# Patient Record
Sex: MALE | Race: WHITE | NOT HISPANIC OR LATINO | Employment: OTHER | URBAN - METROPOLITAN AREA
[De-identification: names, ages, dates, MRNs, and addresses within clinical notes are randomized per-mention and may not be internally consistent; named-entity substitution may affect disease eponyms.]

---

## 2017-09-13 DIAGNOSIS — E78.5 HYPERLIPIDEMIA LDL GOAL <130: ICD-10-CM

## 2017-09-13 DIAGNOSIS — I10 ESSENTIAL HYPERTENSION WITH GOAL BLOOD PRESSURE LESS THAN 140/90: ICD-10-CM

## 2017-09-13 LAB
ANION GAP SERPL CALCULATED.3IONS-SCNC: 14 MMOL/L (ref 3–14)
BUN SERPL-MCNC: 14 MG/DL (ref 7–30)
CALCIUM SERPL-MCNC: 6.2 MG/DL (ref 8.5–10.1)
CHLORIDE SERPL-SCNC: 106 MMOL/L (ref 94–109)
CHOLEST SERPL-MCNC: 185 MG/DL
CO2 SERPL-SCNC: 20 MMOL/L (ref 20–32)
CREAT SERPL-MCNC: 0.9 MG/DL (ref 0.66–1.25)
CREAT UR-MCNC: 95 MG/DL
GFR SERPL CREATININE-BSD FRML MDRD: 82 ML/MIN/1.7M2
GLUCOSE SERPL-MCNC: 102 MG/DL (ref 70–99)
HDLC SERPL-MCNC: 84 MG/DL
LDLC SERPL CALC-MCNC: 91 MG/DL
MICROALBUMIN UR-MCNC: 6 MG/L
MICROALBUMIN/CREAT UR: 5.84 MG/G CR (ref 0–17)
NONHDLC SERPL-MCNC: 101 MG/DL
POTASSIUM SERPL-SCNC: 4.1 MMOL/L (ref 3.4–5.3)
SODIUM SERPL-SCNC: 140 MMOL/L (ref 133–144)
TRIGL SERPL-MCNC: 48 MG/DL

## 2017-09-13 PROCEDURE — 36415 COLL VENOUS BLD VENIPUNCTURE: CPT | Performed by: FAMILY MEDICINE

## 2017-09-13 PROCEDURE — 80061 LIPID PANEL: CPT | Performed by: FAMILY MEDICINE

## 2017-09-13 PROCEDURE — 82043 UR ALBUMIN QUANTITATIVE: CPT | Performed by: FAMILY MEDICINE

## 2017-09-13 PROCEDURE — 80048 BASIC METABOLIC PNL TOTAL CA: CPT | Performed by: FAMILY MEDICINE

## 2017-09-13 NOTE — LETTER
September 14, 2017      Alonso Churchill  [address]  [place] [address]        Dear Alonso,    Your test results are attached. I am happy to let you know that they are stable and your medications can stay the same.    The blood sugar is normal and you do not have diabetes. The kidneys are healthy. The calcium level is lower. You may need more vitamin D to help absorb calcium from your diet. Please take 1000 units a day if you are not already taking vitamin D.      Resulted Orders   Lipid panel reflex to direct LDL   Result Value Ref Range    Cholesterol 185 <200 mg/dL    Triglycerides 48 <150 mg/dL      Comment:      Fasting specimen    HDL Cholesterol 84 >39 mg/dL    LDL Cholesterol Calculated 91 <100 mg/dL      Comment:      Desirable:       <100 mg/dl    Non HDL Cholesterol 101 <130 mg/dL   Basic metabolic panel   Result Value Ref Range    Sodium 140 133 - 144 mmol/L    Potassium 4.1 3.4 - 5.3 mmol/L    Chloride 106 94 - 109 mmol/L    Carbon Dioxide 20 20 - 32 mmol/L    Anion Gap 14 3 - 14 mmol/L    Glucose 102 (H) 70 - 99 mg/dL      Comment:      Fasting specimen    Urea Nitrogen 14 7 - 30 mg/dL    Creatinine 0.90 0.66 - 1.25 mg/dL    GFR Estimate 82 >60 mL/min/1.7m2      Comment:      Non  GFR Calc    GFR Estimate If Black >90 >60 mL/min/1.7m2      Comment:       GFR Calc    Calcium 6.2 (L) 8.5 - 10.1 mg/dL   Albumin Random Urine Quantitative with Creat Ratio   Result Value Ref Range    Creatinine Urine 95 mg/dL    Albumin Urine mg/L 6 mg/L    Albumin Urine mg/g Cr 5.84 0 - 17 mg/g Cr       Please call me if you have any questions about these test results or about your care.    Sincerely,      Yvonne Granado MD/rafy

## 2017-09-15 ENCOUNTER — OFFICE VISIT (OUTPATIENT)
Dept: FAMILY MEDICINE | Facility: CLINIC | Age: 72
End: 2017-09-15
Payer: MEDICARE

## 2017-09-15 VITALS
DIASTOLIC BLOOD PRESSURE: 73 MMHG | OXYGEN SATURATION: 99 % | WEIGHT: 147 LBS | TEMPERATURE: 98.4 F | SYSTOLIC BLOOD PRESSURE: 139 MMHG | BODY MASS INDEX: 23.63 KG/M2 | HEART RATE: 64 BPM | HEIGHT: 66 IN

## 2017-09-15 DIAGNOSIS — D69.2 PURPURA (H): ICD-10-CM

## 2017-09-15 DIAGNOSIS — E55.9 VITAMIN D DEFICIENCY: ICD-10-CM

## 2017-09-15 DIAGNOSIS — Z23 NEED FOR PROPHYLACTIC VACCINATION AND INOCULATION AGAINST INFLUENZA: ICD-10-CM

## 2017-09-15 DIAGNOSIS — E83.51 HYPOCALCEMIA: ICD-10-CM

## 2017-09-15 DIAGNOSIS — E78.5 HYPERLIPIDEMIA LDL GOAL <130: ICD-10-CM

## 2017-09-15 DIAGNOSIS — L57.0 AK (ACTINIC KERATOSIS): ICD-10-CM

## 2017-09-15 DIAGNOSIS — Z00.01 ENCOUNTER FOR ROUTINE ADULT PHYSICAL EXAM WITH ABNORMAL FINDINGS: Primary | ICD-10-CM

## 2017-09-15 DIAGNOSIS — I10 ESSENTIAL HYPERTENSION WITH GOAL BLOOD PRESSURE LESS THAN 140/90: ICD-10-CM

## 2017-09-15 DIAGNOSIS — M48.02 CERVICAL STENOSIS OF SPINAL CANAL: ICD-10-CM

## 2017-09-15 DIAGNOSIS — Z97.4 WEARS HEARING AID: ICD-10-CM

## 2017-09-15 DIAGNOSIS — K63.8219 SMALL INTESTINAL BACTERIAL OVERGROWTH: ICD-10-CM

## 2017-09-15 DIAGNOSIS — K58.2 IRRITABLE BOWEL SYNDROME WITH BOTH CONSTIPATION AND DIARRHEA: ICD-10-CM

## 2017-09-15 LAB
ALBUMIN SERPL-MCNC: 3.5 G/DL (ref 3.4–5)
CA-I SERPL ISE-MCNC: 4.8 MG/DL (ref 4.4–5.2)
CALCIUM SERPL-MCNC: 8.7 MG/DL (ref 8.5–10.1)
DEPRECATED CALCIDIOL+CALCIFEROL SERPL-MC: 40 UG/L (ref 20–75)
ERYTHROCYTE [DISTWIDTH] IN BLOOD BY AUTOMATED COUNT: 12.6 % (ref 10–15)
HCT VFR BLD AUTO: 39.3 % (ref 40–53)
HGB BLD-MCNC: 13.3 G/DL (ref 13.3–17.7)
MAGNESIUM SERPL-MCNC: 2.1 MG/DL (ref 1.6–2.3)
MCH RBC QN AUTO: 33.2 PG (ref 26.5–33)
MCHC RBC AUTO-ENTMCNC: 33.8 G/DL (ref 31.5–36.5)
MCV RBC AUTO: 98 FL (ref 78–100)
PLATELET # BLD AUTO: 239 10E9/L (ref 150–450)
PTH-INTACT SERPL-MCNC: 38 PG/ML (ref 12–72)
RBC # BLD AUTO: 4.01 10E12/L (ref 4.4–5.9)
WBC # BLD AUTO: 4.2 10E9/L (ref 4–11)

## 2017-09-15 PROCEDURE — 85027 COMPLETE CBC AUTOMATED: CPT | Performed by: FAMILY MEDICINE

## 2017-09-15 PROCEDURE — G0008 ADMIN INFLUENZA VIRUS VAC: HCPCS | Performed by: FAMILY MEDICINE

## 2017-09-15 PROCEDURE — 82040 ASSAY OF SERUM ALBUMIN: CPT | Performed by: FAMILY MEDICINE

## 2017-09-15 PROCEDURE — 83735 ASSAY OF MAGNESIUM: CPT | Performed by: FAMILY MEDICINE

## 2017-09-15 PROCEDURE — 90662 IIV NO PRSV INCREASED AG IM: CPT | Performed by: FAMILY MEDICINE

## 2017-09-15 PROCEDURE — 83970 ASSAY OF PARATHORMONE: CPT | Performed by: FAMILY MEDICINE

## 2017-09-15 PROCEDURE — G0439 PPPS, SUBSEQ VISIT: HCPCS | Performed by: FAMILY MEDICINE

## 2017-09-15 PROCEDURE — 82306 VITAMIN D 25 HYDROXY: CPT | Performed by: FAMILY MEDICINE

## 2017-09-15 PROCEDURE — 36415 COLL VENOUS BLD VENIPUNCTURE: CPT | Performed by: FAMILY MEDICINE

## 2017-09-15 PROCEDURE — 82330 ASSAY OF CALCIUM: CPT | Performed by: FAMILY MEDICINE

## 2017-09-15 PROCEDURE — 82310 ASSAY OF CALCIUM: CPT | Performed by: FAMILY MEDICINE

## 2017-09-15 RX ORDER — VITAMIN E 268 MG
CAPSULE ORAL
COMMUNITY
End: 2020-09-22

## 2017-09-15 RX ORDER — CALCIUM CARBONATE 300MG(750)
TABLET,CHEWABLE ORAL
COMMUNITY
End: 2020-09-22

## 2017-09-15 RX ORDER — FOLIC ACID/MULTIVIT,IRON,MINER 0.4MG-18MG
TABLET ORAL 2 TIMES DAILY
COMMUNITY

## 2017-09-15 RX ORDER — NICOTINE 14MG/24HR
PATCH, TRANSDERMAL 24 HOURS TRANSDERMAL DAILY
COMMUNITY
End: 2019-09-19

## 2017-09-15 RX ORDER — CALCIUM CARBONATE 500 MG/1
1 TABLET, CHEWABLE ORAL 2 TIMES DAILY
Qty: 100 TABLET | Refills: 3 | Status: SHIPPED | OUTPATIENT
Start: 2017-09-15 | End: 2018-09-18

## 2017-09-15 ASSESSMENT — PAIN SCALES - GENERAL: PAINLEVEL: NO PAIN (0)

## 2017-09-15 NOTE — PROGRESS NOTES
SUBJECTIVE:   Alonso Churchill is a 72 year old male who presents for Preventive Visit.  Are you in the first 12 months of your Medicare Part B coverage?  No    Healthy Habits:    Do you get at least three servings of calcium containing foods daily (dairy, green leafy vegetables, etc.)? No due to small intestinal bacterial overload (SIBO), saw [place] doctor and was put on a strict diet for a while and treated with Xifaxan 550 mg.  3 hemorrhoid banded.     Amount of exercise or daily activities, outside of work: 4-5 day(s) per week    Problems taking medications regularly No    Medication side effects: No    Have you had an eye exam in the past two years? yes    Do you see a dentist twice per year? yes    Do you have sleep apnea, excessive snoring or daytime drowsiness?no    COGNITIVE SCREEN  1) Repeat 3 items (Banana, Sunrise, Chair)    2) Clock draw: ABNORMAL correct hand on 11am but incorrect hand on 15 minutes  3) 3 item recall: Recalls 3 objects  Results: ABNORMAL clock, 1-2 items recalled: PROBABLE COGNITIVE IMPAIRMENT, **INFORM PROVIDER**    Mini-CogTM Copyright S María. Licensed by the author for use in [facility]; reprinted with permission (nima@[facility]). All rights reserved.            Hyperlipidemia Follow-Up      Rate your low fat/cholesterol diet?: good    Taking statin?  Yes, no muscle aches from statin    Other lipid medications/supplements?:  none    Hypertension Follow-up      Outpatient blood pressures are not being checked.    Low Salt Diet: no added salt    Last Basic Metabolic Panel:  Lab Results   Component Value Date     09/13/2017      Lab Results   Component Value Date    POTASSIUM 4.1 09/13/2017     Lab Results   Component Value Date    CHLORIDE 106 09/13/2017     Lab Results   Component Value Date    EFRAIN 6.2 09/13/2017     Lab Results   Component Value Date    CO2 20 09/13/2017     Lab Results   Component Value Date    BUN 14 09/13/2017     Lab Results   Component  Value Date    CR 0.90 09/13/2017     Lab Results   Component Value Date     09/13/2017      calcium level dropped and this may be due to decreased dairy intake, vitamin D deficiency, PTH deficiency. No muscle cramps or other symptoms related to low calcium noted. Not taking any calcium or vitamin D supplements.         Reviewed and updated as needed this visit by clinical staffTobacco  Allergies  Meds         Reviewed and updated as needed this visit by Provider        Social History   Substance Use Topics     Smoking status: Former Smoker     Packs/day: 0.50     Years: 10.00     Types: Cigarettes     Smokeless tobacco: Never Used      Comment: 35 years      Alcohol use 0.0 oz/week     0 Standard drinks or equivalent per week      Comment: glass of wine per day        The patient does not drink >3 drinks per day nor >7 drinks per week.    Today's PHQ-2 Score:   PHQ-2 ( 1999 [employer]) 9/15/2017 9/12/2016   Q1: Little interest or pleasure in doing things 0 -   Q2: Feeling down, depressed or hopeless 0 -   PHQ-2 Score 0 -   Q1: Little interest or pleasure in doing things - Not at all   Q2: Feeling down, depressed or hopeless - Not at all   PHQ-2 Score - 0         Do you feel safe in your environment - Yes    Do you have a Health Care Directive?: Yes: Patient has healthcare directive and brought with today to be scanned in.    Current providers sharing in care for this patient include: Patient Care Team:  Yvonne Granado MD as PCP - General (Family Practice)      Hearing impairment: Yes, uses hearing aid    Ability to successfully perform activities of daily living: Yes, no assistance needed     Fall risk:  Fallen 2 or more times in the past year?: No  Any fall with injury in the past year?: No      Home safety:  none identified  click delete button to remove this line now    The following health maintenance items are reviewed in Epic and correct as of today:Health Maintenance   Topic Date Due     AORTIC  ANEURYSM SCREENING (SYSTEM ASSIGNED)  04/03/2009     ADVANCE DIRECTIVE PLANNING Q5 YRS  12/22/2016     INFLUENZA VACCINE (SYSTEM ASSIGNED)  09/01/2017     FALL RISK ASSESSMENT  09/15/2017     COLON CANCER SCREEN (SYSTEM ASSIGNED)  05/26/2021     LIPID SCREEN Q5 YR MALE (SYSTEM ASSIGNED)  09/13/2022     TETANUS IMMUNIZATION (SYSTEM ASSIGNED)  09/15/2026     PNEUMOCOCCAL  Completed     Labs reviewed in EPIC  BP Readings from Last 3 Encounters:   09/15/17 139/73   09/15/16 138/72   08/18/16 138/70    Wt Readings from Last 3 Encounters:   09/15/17 147 lb (66.7 kg)   09/15/16 153 lb (69.4 kg)   08/18/16 153 lb (69.4 kg)                  Patient Active Problem List   Diagnosis     IBS (irritable bowel syndrome)     Hyperlipidemia LDL goal <130     Advance care planning     AK (actinic keratosis)     Family history of prostate cancer     Cervical stenosis of spinal canal     Essential hypertension with goal blood pressure less than 140/90     Wears hearing aid     Past Surgical History:   Procedure Laterality Date     HEMORRHOIDECTOMY BANDING  2017     ROTATOR CUFF REPAIR RT/LT  3/2012    right  - dr. benavidez     SURGICAL HISTORY OF -   disc surgery     SURGICAL HISTORY OF -   back surgery       Social History   Substance Use Topics     Smoking status: Former Smoker     Packs/day: 0.50     Years: 10.00     Types: Cigarettes     Smokeless tobacco: Never Used      Comment: 35 years      Alcohol use 0.0 oz/week     0 Standard drinks or equivalent per week      Comment: glass of wine per day      Family History   Problem Relation Age of Onset     HEART DISEASE Father      CEREBROVASCULAR DISEASE Father      Prostate Cancer Brother      had surgery         Current Outpatient Prescriptions   Medication Sig Dispense Refill     Psyllium (METAMUCIL PO) Take by mouth every morning       Omega-3 Fatty Acids (OMEGA-3 FISH OIL) 1200 MG CAPS Take by mouth 2 times daily       Zinc Sulfate (ZINC 15 PO) Take by mouth three times a week        vitamin E 400 UNIT capsule Take by mouth three times a week       Magnesium 400 MG TABS Take by mouth three times a week       Saccharomyces boulardii (PROBIOTIC) 250 MG CAPS Take by mouth daily       losartan (COZAAR) 50 MG tablet Take 1 tablet (50 mg) by mouth daily for blood pressure. 90 tablet 3     simvastatin (ZOCOR) 20 MG tablet Take 1 tablet (20 mg) by mouth every evening 90 tablet 3     Cyanocobalamin (B-12 PO) Take 400 mg by mouth       MELOXICAM PO Take 7.5 mg by mouth daily        CYCLOBENZAPRINE HCL PO Take 10 mg by mouth daily as needed for muscle spasms       MULTI-VITAMIN PO 1 Tablet every day       CALCIUM 600/VITAMIN D 600-400 MG-UNIT PO TABS 1 Tablet every other day       ASPIRIN 81 MG PO TABS 1 TABLET DAILY*       Allergies   Allergen Reactions     Lisinopril      Cough       Trazodone Hcl Other (See Comments)     Dizzy feeling and funny dreams     Recent Labs   Lab Test  09/13/17   0655  09/02/16   0650  09/14/15   0730   09/12/14   0817  09/11/13   0825   12/22/10   0848   LDL  91  91  95   --   91  93   < >  82   HDL  84  75  75   --   84  65   < >  61   TRIG  48  49  58   --   64  58   < >  49   ALT   --   22   --    --   30  22   < >  30   CR  0.90  0.88  0.75   --   0.92  0.85   < >  0.84   GFRESTIMATED  82  86  >90  Non  GFR Calc     --   81  89   < >  >90   GFRESTBLACK  >90  >90  African American GFR Calc    >90   GFR Calc     --   >90   GFR Calc    >90   < >  >90   POTASSIUM  4.1  4.3  4.2   < >  5.4*  4.7   < >  5.3   TSH   --    --    --    --    --   1.87   --   1.11    < > = values in this interval not displayed.              Pneumonia Vaccine:Adults age 65+ who received Pneumovax (PPSV23) at 65 years or older: Should be given PCV13 > 1 year after their most recent PPSV23    ROS:  Constitutional, HEENT, cardiovascular, pulmonary, gi and gu systems are negative, except as otherwise noted.      OBJECTIVE:   /73 (BP Location:  "Right arm, Patient Position: Chair, Cuff Size: Adult Regular)  Pulse 64  Temp 98.4  F (36.9  C) (Oral)  Ht 5' 6\" (1.676 m)  Wt 147 lb (66.7 kg)  SpO2 99%  BMI 23.73 kg/m2 Estimated body mass index is 23.73 kg/(m^2) as calculated from the following:    Height as of this encounter: 5' 6\" (1.676 m).    Weight as of this encounter: 147 lb (66.7 kg).  EXAM:   GENERAL: elderly, alert, well nourished, well hydrated, no distress  HENT: ear canals- normal; TMs- normal; Nose- normal; Mouth- no ulcers, no lesions, missing dentition  NECK: no tenderness, no adenopathy, no asymmetry, no masses, no stiffness; thyroid- normal to palpation  RESP: lungs clear to auscultation - no rales, no rhonchi, no wheezes  CV: regular rates and rhythm, normal S1 S2, no S3 or S4 and no murmur, no click or rub, normal pulses  ABDOMEN: soft, no tenderness, no  hepatosplenomegaly, no masses, normal bowel sounds  MS: extremities- no gross deformities noted, no edema  SKIN: no suspicious lesions, no rashes, age related skin changes with seborrheic keratosis and no actinic keratosis. Purpura on arms and legs.   NEURO: strength and tone- normal, sensory exam- grossly normal, reflexes- symmetric  BACK: no CVA tenderness, no paralumbar tenderness  MENTAL STATUS EXAM:  Appearance/Behavior: no apparent distress, neatly groomed, dressed appropriately for weather, appears stated age and is not frail-appearing  Speech: normal  Mood/Affect: normal affect  Insight: Good      ASSESSMENT / PLAN:       ICD-10-CM    1. Encounter for routine adult physical exam with abnormal findings Z00.01 Low calcium and purpura on exam. Recent episode of small bowel bacterial overgrowth.    2. Purpura (H) D69.2 CBC with platelets, recurrent lesions over past few months   3. Hypocalcemia- diet has changed with cutting out dairy altogether and no vitamin D or calcium supplements E83.51 Calcium     Calcium ionized     Parathyroid Hormone Intact     Albumin level     Magnesium " "    calcium carbonate (TUMS) 500 MG chewable tablet   4. Hyperlipidemia LDL goal <130 E78.5 stable   5. Essential hypertension with goal blood pressure less than 140/90 I10 Well controlled on medications    6. Small intestinal bacterial overgrowth K63.89 Treated in [place] with resolved symptoms    7. Irritable bowel syndrome with both constipation and diarrhea K58.2 stable   8. AK (actinic keratosis) L57.0 Follow up with dermatology as scheduled   9. Cervical stenosis of spinal canal M48.02 Pain is stable but can't do some of his old activities like golfing.    10. Need for prophylactic vaccination and inoculation against influenza Z23      ADMIN VACCINE, FIRST [15076]     FLU VACCINE, INCREASED ANTIGEN, PRESV FREE, AGE 65+ [86831]   11. Wears hearing aid Z97.4 Normal functioning   12. Vitamin D deficiency- will restart supplement E55.9 cholecalciferol (VITAMIN D) 1000 UNIT tablet     Vitamin D Deficiency       End of Life Planning:  Patient currently has an advanced directive: Yes.  Practitioner is supportive of decision.    COUNSELING:  Reviewed preventive health counseling, as reflected in patient instructions       Regular exercise       Healthy diet/nutrition       Dental care       Colon cancer screening       Prostate cancer screening        Estimated body mass index is 23.73 kg/(m^2) as calculated from the following:    Height as of this encounter: 5' 6\" (1.676 m).    Weight as of this encounter: 147 lb (66.7 kg).     reports that he has quit smoking. His smoking use included Cigarettes. He has a 5.00 pack-year smoking history. He has never used smokeless tobacco.        Appropriate preventive services were discussed with this patient, including applicable screening as appropriate for cardiovascular disease, diabetes, osteopenia/osteoporosis, and glaucoma.  As appropriate for age/gender, discussed screening for colorectal cancer, prostate cancer, breast cancer, and cervical cancer. Checklist reviewing " preventive services available has been given to the patient.    Reviewed patients plan of care and provided an AVS. The Basic Care Plan (routine screening as documented in Health Maintenance) for Alonso meets the Care Plan requirement. This Care Plan has been established and reviewed with the Patient.    Counseling Resources:  ATP IV Guidelines  Pooled Cohorts Equation Calculator  Breast Cancer Risk Calculator  FRAX Risk Assessment  ICSI Preventive Guidelines  Dietary Guidelines for Americans, 2010  USDA's MyPlate  ASA Prophylaxis  Lung CA Screening    Yvonne Granado MD  [facility]

## 2017-09-15 NOTE — NURSING NOTE
"Chief Complaint   Patient presents with     Physical       Initial /73 (BP Location: Right arm, Patient Position: Chair, Cuff Size: Adult Regular)  Pulse 64  Temp 98.4  F (36.9  C) (Oral)  Ht 5' 6\" (1.676 m)  Wt 147 lb (66.7 kg)  SpO2 99%  BMI 23.73 kg/m2 Estimated body mass index is 23.73 kg/(m^2) as calculated from the following:    Height as of this encounter: 5' 6\" (1.676 m).    Weight as of this encounter: 147 lb (66.7 kg).  Medication Reconciliation: complete     Rashad Lees CMA    "

## 2017-09-15 NOTE — MR AVS SNAPSHOT
After Visit Summary   9/15/2017    Alonso Churchill    MRN: [id number]           Patient Information     Date Of Birth          1945        Visit Information        Provider Department      9/15/2017 7:40 AM Yvonne Granado MD [facility]        Today's Diagnoses     Encounter for child physical exam with abnormal findings    -  1    Need for prophylactic vaccination and inoculation against influenza        Hyperlipidemia LDL goal <130        Essential hypertension with goal blood pressure less than 140/90        Irritable bowel syndrome with both constipation and diarrhea        AK (actinic keratosis)        Cervical stenosis of spinal canal        Wears hearing aid        Vitamin D deficiency        Hypocalcemia        Purpura (H)          Care Instructions    How to contact your care team: [phone] Pharmacy [phone]   MD JOANA PEMBERTON PA-C CHRIS JONES, PA-C NAM HO, MD JONATHAN BATES, MD ARVIN VOCAL, MD    Clinic hours M-Th 7am-7pm Fri 7am-5pm.   Urgent care M-F 11am-9pm  Sat/Sun 9am-5pm.   Pharmacy   Mon-Th:  8:00am-8pm   Fri:  8:00am-6:00pm  Sat/Sun  8:00am-5:00 pm       Preventive Health Recommendations:   Male Ages 65 and over    Yearly exam:             See your health care provider every year in order to  o   Review health changes.   o   Discuss preventive care.    o   Review your medicines if your doctor has prescribed any.    Talk with your health care provider about whether you should have a test to screen for prostate cancer (PSA).    Every 3 years, have a diabetes test (fasting glucose). If you are at risk for diabetes, you should have this test more often.    Every 5 years, have a cholesterol test. Have this test more often if you are at risk for high cholesterol or heart disease.     Every 10 years, have a colonoscopy. Or, have a yearly FIT test (stool test). These exams will check for colon cancer.    Talk to with your  health care provider about screening for Abdominal Aortic Aneurysm if you have a family history of AAA or have a history of smoking.    Shots:     Get a flu shot each year.     Get a tetanus shot every 10 years.     Talk to your doctor about your pneumonia vaccines. There are now two you should receive - Pneumovax (PPSV 23) and Prevnar (PCV 13).     Talk to your doctor about a shingles vaccine.     Talk to your doctor about the hepatitis B vaccine.  Nutrition:     Eat at least 5 servings of fruits and vegetables each day.     Eat whole-grain bread, whole-wheat pasta and brown rice instead of white grains and rice.     Talk to your provider about Calcium and Vitamin D.   Lifestyle    Exercise for at least 150 minutes a week (30 minutes a day, 5 days a week). This will help you control your weight and prevent disease.     Limit alcohol to one drink per day.     No smoking.     Wear sunscreen to prevent skin cancer.     See your dentist every six months for an exam and cleaning.   See your eye doctor every 1 to 2 years to screen for conditions such as glaucoma, macular degeneration, cataracts, etc   Hypocalcemia (Adult)  Hypocalcemia is too little calcium in the blood. Calcium is a mineral. It helps the heart and other muscles work well. It s also needed to grow and maintain strong bones and teeth.  Hypocalcemia may be caused by:  Lack of calcium or vitamin D in your diet  Digestive problems  Gland problems  Kidney or pancreas disease  Low magnesium levels  Too much phosphate in your blood  Certain medicines  Hypocalcemia can cause the muscles of the face, hands, and feet to twitch without your control (spasm). It can also cause numbness or tingling around your mouth or in your hands and feet. Other problems may include depression and memory loss.  A blood sample will be taken to check your calcium level. The test also helps figure out if hypocalcemia may be caused by a problem with your kidneys, or with the gland that  controls your calcium level (parathyroid gland). Depending on the cause, you may be given an oral calcium supplement. In severe cases, you may need a shot (injection) of calcium gluconate. You may also have a vitamin D shot or supplement. If low magnesium is the cause, you will have treatment to raise your body s level of this mineral.  Home care  Your healthcare provider may have you take calcium and vitamin D supplements, or other medicines or minerals. Follow your provider s instructions for taking these supplements.  General care  Take any medicines or supplements as directed.  Make diet changes as instructed by your provider. You may be asked to eat more dairy products like milk, cheese, and yogurt.  Avoid drinking sodas. Many of these have phosphates. These can interfere with your ability to absorb calcium.   Try to get out in the sun for at least 20 minutes each day. Sun exposure helps your body make vitamin D. This helps you absorb calcium.  Follow-up care  Follow up as advised by your healthcare provider, or as advised.  When to seek medical advice  Call your healthcare provider right away if any of these occur:  Extreme tiredness (fatigue)  Irregular heartbeat  Depression  Seeing or hearing things that aren t there (hallucinations)  Muscle cramps, spasms, or twitching  Numbness and tingling in the arms, legs, hands, or feet  Seizures  Date Last Reviewed: 8/1/2016 2000-2017 [employer]. [address] [place] [address]. All rights reserved. This information is not intended as a substitute for professional medical care. Always follow your healthcare professional's instructions.                  Follow-ups after your visit        Who to contact     If you have questions or need follow up information about today's clinic visit or your schedule please contact [facility] directly at [phone].  Normal or non-critical lab and imaging results will be communicated to  "you by MyChart, letter or phone within 4 business days after the clinic has received the results. If you do not hear from us within 7 days, please contact the clinic through [employer] or phone. If you have a critical or abnormal lab result, we will notify you by phone as soon as possible.  Submit refill requests through [employer] or call your pharmacy and they will forward the refill request to us. Please allow 3 business days for your refill to be completed.          Additional Information About Your Visit        [employer] Information     [employer] gives you secure access to your electronic health record. If you see a primary care provider, you can also send messages to your care team and make appointments. If you have questions, please call your primary care clinic.  If you do not have a primary care provider, please call [phone] and they will assist you.        Care EveryWhere ID     This is your Care EveryWhere ID. This could be used by other organizations to access your [facility] medical records  [id number]        Your Vitals Were     Pulse Temperature Height Pulse Oximetry BMI (Body Mass Index)       64 98.4  F (36.9  C) (Oral) 5' 6\" (1.676 m) 99% 23.73 kg/m2        Blood Pressure from Last 3 Encounters:   09/15/17 139/73   09/15/16 138/72   08/18/16 138/70    Weight from Last 3 Encounters:   09/15/17 147 lb (66.7 kg)   09/15/16 153 lb (69.4 kg)   08/18/16 153 lb (69.4 kg)              We Performed the Following          ADMIN VACCINE, FIRST [31703]     Albumin level     Calcium ionized     Calcium     CBC with platelets     FLU VACCINE, INCREASED ANTIGEN, PRESV FREE, AGE 65+ [33576]     Magnesium     Parathyroid Hormone Intact     Vitamin D Deficiency          Today's Medication Changes          These changes are accurate as of: 9/15/17  8:09 AM.  If you have any questions, ask your nurse or doctor.               Start taking these medicines.        Dose/Directions    cholecalciferol 1000 UNIT tablet   Commonly " known as:  vitamin D   Used for:  Vitamin D deficiency   Started by:  Yvonne Granado MD        Dose:  1000 Units   Take 1 tablet (1,000 Units) by mouth daily   Quantity:  100 tablet   Refills:  3            Where to get your medicines      These medications were sent to [id number] - [place], [place] - [address] [address]  [address] [address], [facility] [address]     Phone:  [phone]     cholecalciferol 1000 UNIT tablet                Primary Care Provider Office Phone # Fax #    Yvonne Granado [phone]       [address]  [facility] [address]        Equal Access to Services     [facility]: Hadii claribel bruno hadasho Sobaldemarali, waaxda luqadaha, qaybta kaalmada adeegyada, carmen patterson . So [facility] [phone].    ATENCIÓN: Si habla español, tiene a alcala disposición servicios gratuitos de asistencia lingüística. [facility] [phone].    We comply with applicable federal civil rights laws and [place] laws. We do not discriminate on the basis of race, color, national origin, age, disability sex, sexual orientation or gender identity.            Thank you!     Thank you for choosing [facility]  for your care. Our goal is always to provide you with excellent care. Hearing back from our patients is one way we can continue to improve our services. Please take a few minutes to complete the written survey that you may receive in the mail after your visit with us. Thank you!             Your Updated Medication List - Protect others around you: Learn how to safely use, store and throw away your medicines at www.disposemymeds.org.          This list is accurate as of: 9/15/17  8:09 AM.  Always use your most recent med list.                   Brand Name Dispense Instructions for use Diagnosis    aspirin 81 MG tablet      1 TABLET DAILY*        B-12 PO      Take 400 mg by mouth        calcium 600/vitamin D 600-400 MG-UNIT Tabs    Generic drug:  Calcium Carbonate-Vitamin D3      1 Tablet every other day        cholecalciferol 1000 UNIT tablet    vitamin D    100 tablet    Take 1 tablet (1,000 Units) by mouth daily    Vitamin D deficiency       CYCLOBENZAPRINE HCL PO      Take 10 mg by mouth daily as needed for muscle spasms        losartan 50 MG tablet    COZAAR    90 tablet    Take 1 tablet (50 mg) by mouth daily for blood pressure.    Essential hypertension with goal blood pressure less than 140/90       Magnesium 400 MG Tabs      Take by mouth three times a week        MELOXICAM PO      Take 7.5 mg by mouth daily        METAMUCIL PO      Take by mouth every morning        MULTI-VITAMIN PO      1 Tablet every day        Omega-3 Fish Oil 1200 MG Caps      Take by mouth 2 times daily        Probiotic 250 MG Caps      Take by mouth daily        simvastatin 20 MG tablet    ZOCOR    90 tablet    Take 1 tablet (20 mg) by mouth every evening    Hyperlipidemia LDL goal <130       vitamin E 400 UNIT capsule      Take by mouth three times a week        ZINC 15 PO      Take by mouth three times a week

## 2017-09-15 NOTE — NURSING NOTE
Injectable Influenza Immunization Documentation      1.  Has the patient received the information for the injectable influenza vaccine? YES    2. Is the patient 6 months of age or older? YES    3. Does the patient have any of the following contraindications?          Severe allergy to eggs? No     Severe allergic reaction to previous influenza vaccines? No     Allergy to contact lens solution/thimerosol or latex? No     History of Guillain-[place] syndrome? No     Undergoing chemotherapy or radiation therapy?       (vaccine should be given at least 2 weeks prior or 3 weeks after)  No     Currently have moderate or severe illness? No         4.  The vaccine has been administered and the patient was instructed to wait 15 minutes before leaving the building in the event of an allergic reaction: YES    Vaccination given by Rashad Lees CMA

## 2017-09-15 NOTE — PATIENT INSTRUCTIONS
How to contact your care team: [phone] Pharmacy [phone]   MD JOANA PEMBERTON PA-C CHRIS JONES, PA-C NAM HO, MD JONATHAN BATES, MD ARVIN VOCAL, MD    Clinic hours M-Th 7am-7pm Fri 7am-5pm.   Urgent care M-F 11am-9pm  Sat/Sun 9am-5pm.   Pharmacy   Mon-Th:  8:00am-8pm   Fri:  8:00am-6:00pm  Sat/Sun  8:00am-5:00 pm       Preventive Health Recommendations:   Male Ages 65 and over    Yearly exam:             See your health care provider every year in order to  o   Review health changes.   o   Discuss preventive care.    o   Review your medicines if your doctor has prescribed any.    Talk with your health care provider about whether you should have a test to screen for prostate cancer (PSA).    Every 3 years, have a diabetes test (fasting glucose). If you are at risk for diabetes, you should have this test more often.    Every 5 years, have a cholesterol test. Have this test more often if you are at risk for high cholesterol or heart disease.     Every 10 years, have a colonoscopy. Or, have a yearly FIT test (stool test). These exams will check for colon cancer.    Talk to with your health care provider about screening for Abdominal Aortic Aneurysm if you have a family history of AAA or have a history of smoking.    Shots:     Get a flu shot each year.     Get a tetanus shot every 10 years.     Talk to your doctor about your pneumonia vaccines. There are now two you should receive - Pneumovax (PPSV 23) and Prevnar (PCV 13).     Talk to your doctor about a shingles vaccine.     Talk to your doctor about the hepatitis B vaccine.  Nutrition:     Eat at least 5 servings of fruits and vegetables each day.     Eat whole-grain bread, whole-wheat pasta and brown rice instead of white grains and rice.     Talk to your provider about Calcium and Vitamin D.   Lifestyle    Exercise for at least 150 minutes a week (30 minutes a day, 5 days a week). This will help you control your weight and prevent  disease.     Limit alcohol to one drink per day.     No smoking.     Wear sunscreen to prevent skin cancer.     See your dentist every six months for an exam and cleaning.   See your eye doctor every 1 to 2 years to screen for conditions such as glaucoma, macular degeneration, cataracts, etc   Hypocalcemia (Adult)  Hypocalcemia is too little calcium in the blood. Calcium is a mineral. It helps the heart and other muscles work well. It s also needed to grow and maintain strong bones and teeth.  Hypocalcemia may be caused by:  Lack of calcium or vitamin D in your diet  Digestive problems  Gland problems  Kidney or pancreas disease  Low magnesium levels  Too much phosphate in your blood  Certain medicines  Hypocalcemia can cause the muscles of the face, hands, and feet to twitch without your control (spasm). It can also cause numbness or tingling around your mouth or in your hands and feet. Other problems may include depression and memory loss.  A blood sample will be taken to check your calcium level. The test also helps figure out if hypocalcemia may be caused by a problem with your kidneys, or with the gland that controls your calcium level (parathyroid gland). Depending on the cause, you may be given an oral calcium supplement. In severe cases, you may need a shot (injection) of calcium gluconate. You may also have a vitamin D shot or supplement. If low magnesium is the cause, you will have treatment to raise your body s level of this mineral.  Home care  Your healthcare provider may have you take calcium and vitamin D supplements, or other medicines or minerals. Follow your provider s instructions for taking these supplements.  General care  Take any medicines or supplements as directed.  Make diet changes as instructed by your provider. You may be asked to eat more dairy products like milk, cheese, and yogurt.  Avoid drinking sodas. Many of these have phosphates. These can interfere with your ability to absorb  calcium.   Try to get out in the sun for at least 20 minutes each day. Sun exposure helps your body make vitamin D. This helps you absorb calcium.  Follow-up care  Follow up as advised by your healthcare provider, or as advised.  When to seek medical advice  Call your healthcare provider right away if any of these occur:  Extreme tiredness (fatigue)  Irregular heartbeat  Depression  Seeing or hearing things that aren t there (hallucinations)  Muscle cramps, spasms, or twitching  Numbness and tingling in the arms, legs, hands, or feet  Seizures  Date Last Reviewed: 8/1/2016 2000-2017 [employer]. [address] [place] [address]. All rights reserved. This information is not intended as a substitute for professional medical care. Always follow your healthcare professional's instructions.

## 2017-09-17 NOTE — PROGRESS NOTES
Dear Alonso    Your test results are attached. I am happy to let you know that they are stable.    The calcium level is now in normal range and your vitamin D level and parathyroid hormone is also normal. We can recheck labs in 1 year.     Please contact me by [facility]ARMA[facility] if you have any questions about your labs or management.    Yvonne Granado MD

## 2017-09-22 ENCOUNTER — MYC REFILL (OUTPATIENT)
Dept: FAMILY MEDICINE | Facility: CLINIC | Age: 72
End: 2017-09-22

## 2017-09-22 DIAGNOSIS — E78.5 HYPERLIPIDEMIA LDL GOAL <130: ICD-10-CM

## 2017-09-22 DIAGNOSIS — I10 ESSENTIAL HYPERTENSION WITH GOAL BLOOD PRESSURE LESS THAN 140/90: ICD-10-CM

## 2017-09-22 RX ORDER — SIMVASTATIN 20 MG
20 TABLET ORAL EVERY EVENING
Qty: 90 TABLET | Refills: 3 | Status: CANCELLED | OUTPATIENT
Start: 2017-09-22

## 2017-09-22 RX ORDER — SIMVASTATIN 20 MG
TABLET ORAL
Qty: 90 TABLET | Refills: 3 | Status: SHIPPED | OUTPATIENT
Start: 2017-09-22 | End: 2018-09-18

## 2017-09-22 RX ORDER — LOSARTAN POTASSIUM 50 MG/1
50 TABLET ORAL DAILY
Qty: 90 TABLET | Refills: 3 | Status: SHIPPED | OUTPATIENT
Start: 2017-09-22 | End: 2018-11-09

## 2017-09-22 NOTE — TELEPHONE ENCOUNTER
Please see refill encounter for Zocor.     This request is for Cozaar.   Routing to refill pool.   Marybeth Bowers RN

## 2017-09-22 NOTE — TELEPHONE ENCOUNTER
Message from MyChart:  Original authorizing provider: MD Matt Bradley would like a refill of the following medications:  losartan (COZAAR) 50 MG tablet [Yvonne Granado MD]  simvastatin (ZOCOR) 20 MG tablet [Yvonne Granado MD]    Preferred pharmacy: [id number] [place] [address] [address]    Comment:

## 2017-09-22 NOTE — TELEPHONE ENCOUNTER
Prescription approved per [facility] Refill Protocol.  Naya Mckenzie RN

## 2017-09-22 NOTE — TELEPHONE ENCOUNTER
simvastatin (ZOCOR) 20 MG tablet     Last Written Prescription Date: 09/15/16  Last Fill Quantity: 90, # refills: 3  Last Office Visit with G, P or [facility] prescribing provider: 09/15/17       Lab Results   Component Value Date    CHOL 185 09/13/2017     Lab Results   Component Value Date    HDL 84 09/13/2017     Lab Results   Component Value Date    LDL 91 09/13/2017     Lab Results   Component Value Date    TRIG 48 09/13/2017     Lab Results   Component Value Date    CHOLHDLRATIO 2.4 09/14/2015           Ana María [facility]

## 2017-09-22 NOTE — TELEPHONE ENCOUNTER
losartan (COZAAR) 50 MG tablet      Last Written Prescription Date: 09/15/16  Last Fill Quantity: 90, # refills: 3  Last Office Visit with G, P or [facility] prescribing provider: 09/15/17       Potassium   Date Value Ref Range Status   09/13/2017 4.1 3.4 - 5.3 mmol/L Final     Creatinine   Date Value Ref Range Status   09/13/2017 0.90 0.66 - 1.25 mg/dL Final     BP Readings from Last 3 Encounters:   09/15/17 139/73   09/15/16 138/72   08/18/16 138/70         Ana María Diego  [facility]

## 2017-10-02 ENCOUNTER — MYC MEDICAL ADVICE (OUTPATIENT)
Dept: FAMILY MEDICINE | Facility: CLINIC | Age: 72
End: 2017-10-02

## 2018-09-05 NOTE — PROGRESS NOTES
Dear Alonso Churchill,    Your test results are attached. I am happy to let you know that they are stable and your medications can stay the same.    The blood sugar is normal and you do not have diabetes. The kidneys are healthy. The calcium level is lower. You may need more vitamin D to help absorb calcium from your diet. Please take 1000 units a day if you are not already taking vitamin D.     Please call me if you have any questions about these test results or about your care.    Sincerely,    Yvonne Granado MD I, Otilio Delarosa MD, personally saw the patient with ACP.  I have personally performed a face to face diagnostic evaluation on this patient.  I have reviewed the ACP note and agree with the history, exam, and plan of care, except as noted.

## 2018-09-06 ENCOUNTER — OFFICE VISIT (OUTPATIENT)
Dept: FAMILY MEDICINE | Facility: CLINIC | Age: 73
End: 2018-09-06
Payer: COMMERCIAL

## 2018-09-06 VITALS
RESPIRATION RATE: 18 BRPM | DIASTOLIC BLOOD PRESSURE: 72 MMHG | TEMPERATURE: 98.1 F | SYSTOLIC BLOOD PRESSURE: 128 MMHG | HEIGHT: 66 IN | HEART RATE: 59 BPM | WEIGHT: 152 LBS | BODY MASS INDEX: 24.43 KG/M2 | OXYGEN SATURATION: 100 %

## 2018-09-06 DIAGNOSIS — K64.4 EXTERNAL HEMORRHOIDS: ICD-10-CM

## 2018-09-06 DIAGNOSIS — Z12.5 SCREENING FOR PROSTATE CANCER: ICD-10-CM

## 2018-09-06 DIAGNOSIS — E78.5 HYPERLIPIDEMIA LDL GOAL <130: ICD-10-CM

## 2018-09-06 DIAGNOSIS — N40.0 PROSTATE ENLARGEMENT: ICD-10-CM

## 2018-09-06 DIAGNOSIS — K62.5 RECTAL BLEEDING: Primary | ICD-10-CM

## 2018-09-06 DIAGNOSIS — I10 ESSENTIAL HYPERTENSION WITH GOAL BLOOD PRESSURE LESS THAN 140/90: ICD-10-CM

## 2018-09-06 DIAGNOSIS — K58.1 IRRITABLE BOWEL SYNDROME WITH CONSTIPATION: ICD-10-CM

## 2018-09-06 PROCEDURE — 99213 OFFICE O/P EST LOW 20 MIN: CPT | Performed by: FAMILY MEDICINE

## 2018-09-06 ASSESSMENT — PAIN SCALES - GENERAL: PAINLEVEL: MILD PAIN (2)

## 2018-09-06 NOTE — MR AVS SNAPSHOT
After Visit Summary   9/6/2018    Alonso Churchill    MRN: [id number]           Patient Information     Date Of Birth          1945        Visit Information        Provider Department      9/6/2018 7:00 AM Yvonne Granado MD [facility]        Today's Diagnoses     Rectal bleeding    -  1      Care Instructions    At [facility], we strive to deliver an exceptional experience to you, every time we see you.  If you receive a survey in the mail, please send us back your thoughts. We really do value your feedback.    Based on your medical history, these are the current health maintenance/preventive care services that you are due for (some may have been done at this visit.)  Health Maintenance Due   Topic Date Due     AORTIC ANEURYSM SCREENING (SYSTEM ASSIGNED)  04/03/2009     ADVANCE DIRECTIVE PLANNING Q5 YRS  12/22/2016     INFLUENZA VACCINE (1) 09/01/2018     FALL RISK ASSESSMENT  09/15/2018     PHQ-2 Q1 YR  09/15/2018         Suggested websites for health information:  Www.[employer]PR : Up to date and easily searchable information on multiple topics.  Www.medlineplus.gov : medication info, interactive tutorials, watch real surgeries online  Www.familydoctor.org : good info from the Academy of Family Physicians  Www.cdc.gov : public health info, travel advisories, epidemics (H1N1)  Www.aap.org : children's health info, normal development, vaccinations  Www.health.state[place]us : [place] dept of health, public health issues in [place], N1N1    Your care team:                            Family Medicine Internal Medicine   MD Dawson Steele MD Shantel Branch-Fleming, MD Katya Georgiev PA-C Nam Ho, MD Pediatrics   ABHIJEET De Leon, MD Jimena Andino CNP, MD Deborah Mielke, MD Kim Thein, APRKRISTIAN CNP      Clinic hours: Monday - Thursday 7 am-7 pm; Fridays 7 am-5 pm.   Urgent care: Monday  - Friday 11 am-9 pm; Saturday and Sunday 9 am-5 pm.  Pharmacy : Monday -Thursday 8 am-8 pm; Friday 8 am-6 pm; Saturday and Sunday 9 am-5 pm.     Clinic: [phone]   Pharmacy: [phone]    Evaluating and Treating Rectal Bleeding     As part of your evaluation, a flexible sigmoidoscopy or colonoscopy may be done. You may also have an upper endoscopy if your stools are darker.     To find the site and cause of your bleeding, you will have a physical exam. You will be asked about your health history. Tests may be done to help confirm the diagnosis and plan your treatment.  Tests you may have  Any of these procedures may be done:    Stool sample. A small amount of your stool will be checked for blood.    Anoscopy. This test uses a small tube (anoscope) to examine the anus. It checks for problems such as hemorrhoids.    Sigmoidoscopy. This test uses a lighted tube to check your rectum and the part of the large intestine that is closest to the rectum (the sigmoid colon).    Colonoscopy. This test looks at your rectum and entire colon. You may be given medicine through an IV to help you relax.    Lower GI (gastrointestinal) series, or barium enema. This is an X-ray test to view your colon. A milky liquid containing barium is passed through your rectum and into the colon. This liquid makes it easy to see your colon on the X-ray.    Upper endoscopy. This test checks your esophagus, stomach, and upper small intestine. It is done in cases of rectal bleeding along with other symptoms like low blood pressure and rapid heartbeat. This test may also be done if your stools are dark black and tarry.    Capsule endoscopy. For this test, you swallow a pill that has a tiny camera inside. The camera takes pictures of your small intestine. It can get to areas that are hard to reach with colonoscopy and upper endoscopy.    Balloon enteroscopy. This test uses a special tube (scope) to get deep into the small intestine.    Tagged  red blood cell scan. This test marks (tags) red blood cells with very small amounts of radioactive material. The cells can then be seen and tracked on a scan.    Angiography. This test threads a tube (catheter) through a vein, often in the leg. The tube injects dye into your blood vessels to see where the bleeding is taking place.  Your treatment plan  Your treatment will depend on the cause of your rectal bleeding. Your healthcare provider will create a treatment plan that s right for you. Sometimes rectal bleeding stops on its own. If it does, be sure to see your provider to check that the problem wasn t serious.  What you can do  Follow all your doctor s instructions. Keep working with your doctor after your treatment. Make and keep your follow-up visits. If you have more rectal bleeding, call your doctor. It may be a sign of the same or another health problem.   Date Last Reviewed: 7/1/2016 2000-2017 [employer]. [address] [place] [address]. All rights reserved. This information is not intended as a substitute for professional medical care. Always follow your healthcare professional's instructions.                Follow-ups after your visit        Additional Services     GASTROENTEROLOGY ADULT REF PROCEDURE ONLY [facility] [phone]; [facility] (May have another provider if  scheduling is an issue)       Last Lab Result: Creatinine (mg/dL)       Date                     Value                 09/13/2017               0.90             ----------  Body mass index is 24.53 kg/(m^2).      Patient will be contacted to schedule procedure.     Please be aware that coverage of these services is subject to the terms and limitations of your health insurance plan.  Call member services at your health plan with any benefit or coverage questions.  Any procedures must be performed at a [facility] facility OR coordinated by your clinic's referral office.    Please bring the  following with you to your appointment:    (1) Any X-Rays, CTs or MRIs which have been performed.  Contact the facility where they were done to arrange for  prior to your scheduled appointment.    (2) List of current medications   (3) This referral request   (4) Any documents/labs given to you for this referral                  Your next 10 appointments already scheduled     Sep 10, 2018  7:15 AM CDT   [facility]    [address] [address]   [phone]           Please do not eat 10-12 hours before your appointment if you are coming in fasting for labs on lipids, cholesterol, or glucose (sugar). This does not apply to pregnant women. Water, hot tea and black coffee (with nothing added) are okay. Do not drink other fluids, diet soda or chew gum.            Sep 18, 2018 11:00 AM CDT   PHYSICAL with Yvonne Granado MD   [facility]    [address] [id number] [phone]              Who to contact     If you have questions or need follow up information about today's clinic visit or your schedule please contact [facility] directly at [phone].  Normal or non-critical lab and imaging results will be communicated to you by MyChart, letter or phone within 4 business days after the clinic has received the results. If you do not hear from us within 7 days, please contact the clinic through MyChart or phone. If you have a critical or abnormal lab result, we will notify you by phone as soon as possible.  Submit refill requests through [employer] or call your pharmacy and they will forward the refill request to us. Please allow 3 business days for your refill to be completed.          Additional Information About Your Visit        [employer] Information     [employer] gives you secure access to your electronic health  "record. If you see a primary care provider, you can also send messages to your care team and make appointments. If you have questions, please call your primary care clinic.  If you do not have a primary care provider, please call [phone] and they will assist you.        Care EveryWhere ID     This is your Care EveryWhere ID. This could be used by other organizations to access your [place] medical records  [id number]        Your Vitals Were     Pulse Temperature Respirations Height Pulse Oximetry BMI (Body Mass Index)    59 98.1  F (36.7  C) (Oral) 18 5' 6\" (1.676 m) 100% 24.53 kg/m2       Blood Pressure from Last 3 Encounters:   09/06/18 128/72   09/15/17 139/73   09/15/16 138/72    Weight from Last 3 Encounters:   09/06/18 152 lb (68.9 kg)   09/15/17 147 lb (66.7 kg)   09/15/16 153 lb (69.4 kg)              We Performed the Following     GASTROENTEROLOGY ADULT REF PROCEDURE ONLY [facility] [phone]; [facility] (May have another provider if  scheduling is an issue)        Primary Care Provider Office Phone # Fax #    Yvonne Jazmin Granado [phone]       [address]  [facility] [address]        Equal Access to Services     LITO MARAVILLA : Hadii aad ku hadasho Soomaali, waaxda luqadaha, qaybta kaalmada adeegyada, waxay dilia summers. So [facility] [phone].    ATENCIÓN: Si habla español, tiene a alcala disposición servicios gratuitos de asistencia lingüística. Lligor al [phone].    We comply with applicable federal civil rights laws and [place] laws. We do not discriminate on the basis of race, color, national origin, age, disability, sex, sexual orientation, or gender identity.            Thank you!     Thank you for choosing [facility]  for your care. Our goal is always to provide you with excellent care. Hearing back from our patients is one way we can continue to improve our services. Please take a few minutes to " complete the written survey that you may receive in the mail after your visit with us. Thank you!             Your Updated Medication List - Protect others around you: Learn how to safely use, store and throw away your medicines at www.disposemymeds.org.          This list is accurate as of 9/6/18  7:23 AM.  Always use your most recent med list.                   Brand Name Dispense Instructions for use Diagnosis    aspirin 81 MG tablet      1 TABLET DAILY*        B-12 PO      Take 400 mg by mouth        calcium 600/vitamin D 600-400 MG-UNIT Tabs   Generic drug:  Calcium Carbonate-Vitamin D3      1 Tablet every other day        calcium carbonate 500 MG chewable tablet    TUMS    100 tablet    Take 1 tablet (500 mg) by mouth 2 times daily    Hypocalcemia       cholecalciferol 1000 UNIT tablet    vitamin D3    100 tablet    Take 1 tablet (1,000 Units) by mouth daily    Vitamin D deficiency       CYCLOBENZAPRINE HCL PO      Take 10 mg by mouth daily as needed for muscle spasms        losartan 50 MG tablet    COZAAR    90 tablet    Take 1 tablet (50 mg) by mouth daily for blood pressure.    Essential hypertension with goal blood pressure less than 140/90       Magnesium 400 MG Tabs      Take by mouth three times a week        MELOXICAM PO      Take 7.5 mg by mouth daily        METAMUCIL PO      Take by mouth every morning        MULTI-VITAMIN PO      1 Tablet every day        Omega-3 Fish Oil 1200 MG Caps      Take by mouth 2 times daily        Probiotic 250 MG Caps      Take by mouth daily        simvastatin 20 MG tablet    ZOCOR    90 tablet    TAKE 1 TABLET (20 MG) BY MOUTH EVERY EVENING    Hyperlipidemia LDL goal <130       vitamin E 400 UNIT capsule      Take by mouth three times a week        ZINC 15 PO      Take by mouth three times a week

## 2018-09-06 NOTE — PROGRESS NOTES
SUBJECTIVE:   Alonso Churchill is a 73 year old male who presents to clinic today for the following health issues:    Hemorrhoids  Onset: June 2018. Hx of hemmorhoids with banding x 2 winter and May 2018. Physical next week.     Description:   Pain: YES   Itching: YES    Accompanying Signs & Symptoms:  Blood streaked toilet paper: YES  Blood in stool: YES  Changes in stool pattern: no, taking stool softener and helps have soft stools    History:   Any previous GI studies done:none  Family History of colon cancer: no     Precipitating factors:   None    Alleviating factors:  None    Therapies Tried and outcome: none    Hypertension Follow-up      Outpatient blood pressures are not being checked.    Low Salt Diet: no added salt      Problem list and histories reviewed & adjusted, as indicated.  Additional history: as documented    Patient Active Problem List   Diagnosis     IBS (irritable bowel syndrome)     Hyperlipidemia LDL goal <130     Advance care planning     AK (actinic keratosis)     Family history of prostate cancer     Cervical stenosis of spinal canal     Essential hypertension with goal blood pressure less than 140/90     Wears hearing aid     Past Surgical History:   Procedure Laterality Date     HEMORRHOIDECTOMY BANDING  2017     ROTATOR CUFF REPAIR RT/LT  3/2012    right  - dr. benavidez     SURGICAL HISTORY OF -   disc surgery     SURGICAL HISTORY OF -   back surgery       Social History   Substance Use Topics     Smoking status: Former Smoker     Packs/day: 0.50     Years: 10.00     Types: Cigarettes     Smokeless tobacco: Never Used      Comment: 35 years      Alcohol use 0.0 oz/week     0 Standard drinks or equivalent per week      Comment: glass of wine per day      Family History   Problem Relation Age of Onset     HEART DISEASE Father      Cerebrovascular Disease Father      Prostate Cancer Brother      had surgery         Current Outpatient Prescriptions   Medication Sig Dispense Refill      ASPIRIN 81 MG PO TABS 1 TABLET DAILY*       CALCIUM 600/VITAMIN D 600-400 MG-UNIT PO TABS 1 Tablet every other day       calcium carbonate (TUMS) 500 MG chewable tablet Take 1 tablet (500 mg) by mouth 2 times daily 100 tablet 3     cholecalciferol (VITAMIN D) 1000 UNIT tablet Take 1 tablet (1,000 Units) by mouth daily 100 tablet 3     Cyanocobalamin (B-12 PO) Take 400 mg by mouth       CYCLOBENZAPRINE HCL PO Take 10 mg by mouth daily as needed for muscle spasms       losartan (COZAAR) 50 MG tablet Take 1 tablet (50 mg) by mouth daily for blood pressure. 90 tablet 3     Magnesium 400 MG TABS Take by mouth three times a week       MELOXICAM PO Take 7.5 mg by mouth daily        MULTI-VITAMIN PO 1 Tablet every day       Omega-3 Fatty Acids (OMEGA-3 FISH OIL) 1200 MG CAPS Take by mouth 2 times daily       Psyllium (METAMUCIL PO) Take by mouth every morning       Saccharomyces boulardii (PROBIOTIC) 250 MG CAPS Take by mouth daily       simvastatin (ZOCOR) 20 MG tablet TAKE 1 TABLET (20 MG) BY MOUTH EVERY EVENING 90 tablet 3     vitamin E 400 UNIT capsule Take by mouth three times a week       Zinc Sulfate (ZINC 15 PO) Take by mouth three times a week       Allergies   Allergen Reactions     Lisinopril      Cough       Trazodone Hcl Other (See Comments)     Dizzy feeling and funny dreams     Recent Labs   Lab Test  09/13/17   0655  09/02/16   0650  09/14/15   0730   09/12/14   0817  09/11/13   0825   12/22/10   0848   LDL  91  91  95   --   91  93   < >  82   HDL  84  75  75   --   84  65   < >  61   TRIG  48  49  58   --   64  58   < >  49   ALT   --   22   --    --   30  22   < >  30   CR  0.90  0.88  0.75   --   0.92  0.85   < >  0.84   GFRESTIMATED  82  86  >90  Non  GFR Calc     --   81  89   < >  >90   GFRESTBLACK  >90  >90  African American GFR Calc    >90   GFR Calc     --   >90   GFR Calc    >90   < >  >90   POTASSIUM  4.1  4.3  4.2   < >  5.4*  4.7   < >  5.3   TSH   " --    --    --    --    --   1.87   --   1.11    < > = values in this interval not displayed.      BP Readings from Last 3 Encounters:   09/06/18 128/72   09/15/17 139/73   09/15/16 138/72    Wt Readings from Last 3 Encounters:   09/06/18 152 lb (68.9 kg)   09/15/17 147 lb (66.7 kg)   09/15/16 153 lb (69.4 kg)                  Labs reviewed in EPIC    Reviewed and updated as needed this visit by clinical staff  Tobacco  Allergies  Meds  Med Hx  Surg Hx  Fam Hx  Soc Hx      Reviewed and updated as needed this visit by Provider         ROS:  Constitutional, HEENT, cardiovascular, pulmonary, gi and gu systems are negative, except as otherwise noted.    OBJECTIVE:     /72 (BP Location: Right arm, Patient Position: Chair, Cuff Size: Adult Regular)  Pulse 59  Temp 98.1  F (36.7  C) (Oral)  Resp 18  Ht 5' 6\" (1.676 m)  Wt 152 lb (68.9 kg)  SpO2 100%  BMI 24.53 kg/m2  Body mass index is 24.53 kg/(m^2).  GENERAL: healthy, alert and no distress  EYES: Eyes grossly normal to inspection, conjunctivae and sclerae normal  MS: no gross musculoskeletal defects noted, no edema  SKIN: no suspicious lesions or rashes  NEURO: Normal strength and tone, gait and speech normal  PSYCH: mentation appears normal, affect normal/bright     Diagnostic Test Results:  none     ASSESSMENT/PLAN:             1. Rectal bleeding  Has had hemorrhoids banded twice in past year in [place]. Recommended follow up colonoscopy if continued bleeding. I also recommend stopping his baby aspirin once a day due to newer study that shows the risk of bleeding out ways the benefit of primary prevention.   - GASTROENTEROLOGY ADULT REF PROCEDURE ONLY [facility] [phone]; [facility] (May have another provider if  scheduling is an issue)    2. External hemorrhoids  Stable but having recurrent bleeding, possibly due to aspirin use. Discontinue aspirin.     3. Essential hypertension with goal blood pressure less than " 140/90  Well controlled on medications. Follow up for annual physical as scheduled.     4. Irritable bowel syndrome with constipation  Managed by gastroenterology and is stable      FURTHER TESTING:       - early colonoscopy due to rectal bleeding, diagnostic  FUTURE APPOINTMENTS:       - Follow-up for annual visit or as needed  See Patient Instructions    Yvonne Granado MD  [facility]

## 2018-09-06 NOTE — PATIENT INSTRUCTIONS
At [facility], we strive to deliver an exceptional experience to you, every time we see you.  If you receive a survey in the mail, please send us back your thoughts. We really do value your feedback.    Based on your medical history, these are the current health maintenance/preventive care services that you are due for (some may have been done at this visit.)  Health Maintenance Due   Topic Date Due     AORTIC ANEURYSM SCREENING (SYSTEM ASSIGNED)  04/03/2009     ADVANCE DIRECTIVE PLANNING Q5 YRS  12/22/2016     INFLUENZA VACCINE (1) 09/01/2018     FALL RISK ASSESSMENT  09/15/2018     PHQ-2 Q1 YR  09/15/2018         Suggested websites for health information:  Www.[facility].org : Up to date and easily searchable information on multiple topics.  Www.medlineplus.gov : medication info, interactive tutorials, watch real surgeries online  Www.familydoctor.org : good info from the Academy of Family Physicians  Www.cdc.gov : public health info, travel advisories, epidemics (H1N1)  Www.aap.org : children's health info, normal development, vaccinations  Www.health.[facility].[place].us : [place] dept of health, public health issues in [place], N1N1    Your care team:                            Family Medicine Internal Medicine   MD Dawson Steele MD Shantel Branch-Fleming, MD Katya Georgiev PA-C Nam Ho, MD Pediatrics   ABHIJEET De Leon, MD Jimena Andino CNP, MD Deborah Mielke, MD Kim Thein, APRN [facility]      Clinic hours: Monday - Thursday 7 am-7 pm; Fridays 7 am-5 pm.   Urgent care: Monday - Friday 11 am-9 pm; Saturday and Sunday 9 am-5 pm.  Pharmacy : Monday -Thursday 8 am-8 pm; Friday 8 am-6 pm; Saturday and Sunday 9 am-5 pm.     Clinic: [phone]   Pharmacy: [phone]    Evaluating and Treating Rectal Bleeding     As part of your evaluation, a flexible sigmoidoscopy or colonoscopy may be done. You may also have an upper  endoscopy if your stools are darker.     To find the site and cause of your bleeding, you will have a physical exam. You will be asked about your health history. Tests may be done to help confirm the diagnosis and plan your treatment.  Tests you may have  Any of these procedures may be done:    Stool sample. A small amount of your stool will be checked for blood.    Anoscopy. This test uses a small tube (anoscope) to examine the anus. It checks for problems such as hemorrhoids.    Sigmoidoscopy. This test uses a lighted tube to check your rectum and the part of the large intestine that is closest to the rectum (the sigmoid colon).    Colonoscopy. This test looks at your rectum and entire colon. You may be given medicine through an IV to help you relax.    Lower GI (gastrointestinal) series, or barium enema. This is an X-ray test to view your colon. A milky liquid containing barium is passed through your rectum and into the colon. This liquid makes it easy to see your colon on the X-ray.    Upper endoscopy. This test checks your esophagus, stomach, and upper small intestine. It is done in cases of rectal bleeding along with other symptoms like low blood pressure and rapid heartbeat. This test may also be done if your stools are dark black and tarry.    Capsule endoscopy. For this test, you swallow a pill that has a tiny camera inside. The camera takes pictures of your small intestine. It can get to areas that are hard to reach with colonoscopy and upper endoscopy.    Balloon enteroscopy. This test uses a special tube (scope) to get deep into the small intestine.    Tagged red blood cell scan. This test marks (tags) red blood cells with very small amounts of radioactive material. The cells can then be seen and tracked on a scan.    Angiography. This test threads a tube (catheter) through a vein, often in the leg. The tube injects dye into your blood vessels to see where the bleeding is taking place.  Your treatment  plan  Your treatment will depend on the cause of your rectal bleeding. Your healthcare provider will create a treatment plan that s right for you. Sometimes rectal bleeding stops on its own. If it does, be sure to see your provider to check that the problem wasn t serious.  What you can do  Follow all your doctor s instructions. Keep working with your doctor after your treatment. Make and keep your follow-up visits. If you have more rectal bleeding, call your doctor. It may be a sign of the same or another health problem.   Date Last Reviewed: 7/1/2016 2000-2017 [employer]. [address] [place] [address]. All rights reserved. This information is not intended as a substitute for professional medical care. Always follow your healthcare professional's instructions.

## 2018-09-10 DIAGNOSIS — I10 ESSENTIAL HYPERTENSION WITH GOAL BLOOD PRESSURE LESS THAN 140/90: ICD-10-CM

## 2018-09-10 DIAGNOSIS — N40.0 PROSTATE ENLARGEMENT: ICD-10-CM

## 2018-09-10 DIAGNOSIS — E78.5 HYPERLIPIDEMIA LDL GOAL <130: ICD-10-CM

## 2018-09-10 DIAGNOSIS — Z12.5 SCREENING FOR PROSTATE CANCER: ICD-10-CM

## 2018-09-10 LAB
ALBUMIN SERPL-MCNC: 3.4 G/DL (ref 3.4–5)
ALBUMIN UR-MCNC: NEGATIVE MG/DL
ANION GAP SERPL CALCULATED.3IONS-SCNC: 8 MMOL/L (ref 3–14)
APPEARANCE UR: CLEAR
BILIRUB UR QL STRIP: NEGATIVE
BUN SERPL-MCNC: 17 MG/DL (ref 7–30)
CALCIUM SERPL-MCNC: 8.9 MG/DL (ref 8.5–10.1)
CHLORIDE SERPL-SCNC: 106 MMOL/L (ref 94–109)
CHOLEST SERPL-MCNC: 179 MG/DL
CO2 SERPL-SCNC: 27 MMOL/L (ref 20–32)
COLOR UR AUTO: YELLOW
CREAT SERPL-MCNC: 0.9 MG/DL (ref 0.66–1.25)
CREAT UR-MCNC: 80 MG/DL
ERYTHROCYTE [DISTWIDTH] IN BLOOD BY AUTOMATED COUNT: 12.4 % (ref 10–15)
GFR SERPL CREATININE-BSD FRML MDRD: 82 ML/MIN/1.7M2
GLUCOSE SERPL-MCNC: 99 MG/DL (ref 70–99)
GLUCOSE UR STRIP-MCNC: NEGATIVE MG/DL
HCT VFR BLD AUTO: 39.1 % (ref 40–53)
HDLC SERPL-MCNC: 73 MG/DL
HGB BLD-MCNC: 13.2 G/DL (ref 13.3–17.7)
HGB UR QL STRIP: NEGATIVE
KETONES UR STRIP-MCNC: NEGATIVE MG/DL
LDLC SERPL CALC-MCNC: 91 MG/DL
LEUKOCYTE ESTERASE UR QL STRIP: NEGATIVE
MCH RBC QN AUTO: 33 PG (ref 26.5–33)
MCHC RBC AUTO-ENTMCNC: 33.8 G/DL (ref 31.5–36.5)
MCV RBC AUTO: 98 FL (ref 78–100)
MICROALBUMIN UR-MCNC: 7 MG/L
MICROALBUMIN/CREAT UR: 8.2 MG/G CR (ref 0–17)
NITRATE UR QL: NEGATIVE
NONHDLC SERPL-MCNC: 106 MG/DL
PH UR STRIP: 6 PH (ref 5–7)
PHOSPHATE SERPL-MCNC: 2.8 MG/DL (ref 2.5–4.5)
PLATELET # BLD AUTO: 233 10E9/L (ref 150–450)
POTASSIUM SERPL-SCNC: 4.4 MMOL/L (ref 3.4–5.3)
PSA SERPL-ACNC: 2.59 UG/L (ref 0–4)
RBC # BLD AUTO: 4 10E12/L (ref 4.4–5.9)
SODIUM SERPL-SCNC: 141 MMOL/L (ref 133–144)
SOURCE: NORMAL
SP GR UR STRIP: 1.01 (ref 1–1.03)
TRIGL SERPL-MCNC: 77 MG/DL
UROBILINOGEN UR STRIP-ACNC: 0.2 EU/DL (ref 0.2–1)
WBC # BLD AUTO: 5.2 10E9/L (ref 4–11)

## 2018-09-10 PROCEDURE — 82043 UR ALBUMIN QUANTITATIVE: CPT | Performed by: FAMILY MEDICINE

## 2018-09-10 PROCEDURE — 80069 RENAL FUNCTION PANEL: CPT | Performed by: FAMILY MEDICINE

## 2018-09-10 PROCEDURE — 80061 LIPID PANEL: CPT | Performed by: FAMILY MEDICINE

## 2018-09-10 PROCEDURE — 36415 COLL VENOUS BLD VENIPUNCTURE: CPT | Performed by: FAMILY MEDICINE

## 2018-09-10 PROCEDURE — G0103 PSA SCREENING: HCPCS | Performed by: FAMILY MEDICINE

## 2018-09-10 PROCEDURE — 85027 COMPLETE CBC AUTOMATED: CPT | Performed by: FAMILY MEDICINE

## 2018-09-10 PROCEDURE — 81003 URINALYSIS AUTO W/O SCOPE: CPT | Performed by: FAMILY MEDICINE

## 2018-09-11 NOTE — PROGRESS NOTES
Dear Alonso    Your test results are attached. I am happy to let you know that they are stable.    The blood sugar is normal and you do not have diabetes. The kidneys are healthy. The test for prostate cancer was normal and shows low risk. The cholesterol looks great.     The hemoglobin is a little low and shows a mild anemia. You can treat this by either eating more red meat like hamburger, stew meat, roast or steak 2-3 times a week, or take a multivitamin with iron. We can recheck labs in 6 months.     Please contact me by [employer] if you have any questions about your labs or management.    Yvonne Granado MD

## 2018-09-18 ENCOUNTER — OFFICE VISIT (OUTPATIENT)
Dept: FAMILY MEDICINE | Facility: CLINIC | Age: 73
End: 2018-09-18
Payer: COMMERCIAL

## 2018-09-18 VITALS
HEIGHT: 66 IN | TEMPERATURE: 97.7 F | HEART RATE: 63 BPM | RESPIRATION RATE: 20 BRPM | OXYGEN SATURATION: 100 % | WEIGHT: 148.6 LBS | SYSTOLIC BLOOD PRESSURE: 136 MMHG | BODY MASS INDEX: 23.88 KG/M2 | DIASTOLIC BLOOD PRESSURE: 82 MMHG

## 2018-09-18 DIAGNOSIS — E78.5 HYPERLIPIDEMIA LDL GOAL <130: ICD-10-CM

## 2018-09-18 DIAGNOSIS — Z80.42 FAMILY HISTORY OF PROSTATE CANCER: ICD-10-CM

## 2018-09-18 DIAGNOSIS — I10 ESSENTIAL HYPERTENSION WITH GOAL BLOOD PRESSURE LESS THAN 140/90: ICD-10-CM

## 2018-09-18 DIAGNOSIS — K58.1 IRRITABLE BOWEL SYNDROME WITH CONSTIPATION: ICD-10-CM

## 2018-09-18 DIAGNOSIS — Z97.4 WEARS HEARING AID: ICD-10-CM

## 2018-09-18 DIAGNOSIS — M48.02 CERVICAL STENOSIS OF SPINAL CANAL: ICD-10-CM

## 2018-09-18 DIAGNOSIS — Z23 NEED FOR PROPHYLACTIC VACCINATION AND INOCULATION AGAINST INFLUENZA: ICD-10-CM

## 2018-09-18 DIAGNOSIS — E83.51 HYPOCALCEMIA: ICD-10-CM

## 2018-09-18 DIAGNOSIS — Z00.00 NORMAL PHYSICAL EXAM, ROUTINE: Primary | ICD-10-CM

## 2018-09-18 PROCEDURE — 90662 IIV NO PRSV INCREASED AG IM: CPT | Performed by: FAMILY MEDICINE

## 2018-09-18 PROCEDURE — G0008 ADMIN INFLUENZA VIRUS VAC: HCPCS | Performed by: FAMILY MEDICINE

## 2018-09-18 PROCEDURE — G0402 INITIAL PREVENTIVE EXAM: HCPCS | Performed by: FAMILY MEDICINE

## 2018-09-18 RX ORDER — CALCIUM CARBONATE 500 MG/1
1 TABLET, CHEWABLE ORAL 2 TIMES DAILY
Qty: 100 TABLET | Refills: 3 | Status: SHIPPED | OUTPATIENT
Start: 2018-09-18 | End: 2020-09-22

## 2018-09-18 RX ORDER — SIMVASTATIN 20 MG
20 TABLET ORAL AT BEDTIME
Qty: 90 TABLET | Refills: 3 | Status: SHIPPED | OUTPATIENT
Start: 2018-09-18 | End: 2019-09-19

## 2018-09-18 ASSESSMENT — PAIN SCALES - GENERAL: PAINLEVEL: NO PAIN (0)

## 2018-09-18 NOTE — PROGRESS NOTES

## 2018-09-18 NOTE — PROGRESS NOTES
SUBJECTIVE:   Alonso Churchill is a 73 year old male who presents for Preventive Visit.      Are you in the first 12 months of your Medicare Part B coverage?  Yes,  Patient declined vision test already had it done.    Healthy Habits:    Do you get at least three servings of calcium containing foods daily (dairy, green leafy vegetables, etc.)? yes    Amount of exercise or daily activities, outside of work: 4 day(s) per week    Problems taking medications regularly No    Medication side effects: No    Have you had an eye exam in the past two years? yes    Do you see a dentist twice per year? yes    Do you have sleep apnea, excessive snoring or daytime drowsiness?no      Ability to successfully perform activities of daily living: Yes, no assistance needed    Home safety:  none identified     Hearing impairment: Yes, None    Fall risk:  Fallen 2 or more times in the past year?: No  Any fall with injury in the past year?: No        COGNITIVE SCREEN  1) Repeat 3 items (Leader, Season, Table)    2) Clock draw: NORMAL  3) 3 item recall: Recalls 2 objects   Results: NORMAL clock, 1-2 items recalled: COGNITIVE IMPAIRMENT LESS LIKELY    Mini-CogTM Copyright S María. Licensed by the author for use in [facility]; reprinted with permission (nima@[facility]). All rights reserved.      Hyperlipidemia Follow-Up      Rate your low fat/cholesterol diet?: good    Taking statin?  Yes, no muscle aches from statin    Other lipid medications/supplements?:  none    Hypertension Follow-up      Outpatient blood pressures are not being checked.    Low Salt Diet: no added salt       Blood pressure medication, bubbles in urine, baby asprin    Reviewed and updated as needed this visit by clinical staff  Tobacco  Allergies  Meds  Med Hx  Surg Hx  Fam Hx  Soc Hx        Reviewed and updated as needed this visit by Provider  Meds        Social History   Substance Use Topics     Smoking status: Former Smoker     Packs/day: 0.50      Years: 10.00     Types: Cigarettes     Smokeless tobacco: Never Used      Comment: 35 years      Alcohol use 0.0 oz/week     0 Standard drinks or equivalent per week      Comment: glass of wine per day        If you drink alcohol do you typically have >3 drinks per day or >7 drinks per week? No                        Today's PHQ-2 Score:   PHQ-2 ( 1999 Pfizer) 9/18/2018 9/15/2017   Q1: Little interest or pleasure in doing things 0 0   Q2: Feeling down, depressed or hopeless 0 0   PHQ-2 Score 0 0   Q1: Little interest or pleasure in doing things - -   Q2: Feeling down, depressed or hopeless - -   PHQ-2 Score - -       Do you feel safe in your environment - Yes    Do you have a Health Care Directive?: Yes: Advance Directive has been received and scanned.    Current providers sharing in care for this patient include:   Patient Care Team:  Yvonne Granado MD as PCP - General (Family Practice)    The following health maintenance items are reviewed in Epic and correct as of today:  Health Maintenance   Topic Date Due     AORTIC ANEURYSM SCREENING (SYSTEM ASSIGNED)  04/03/2009     ADVANCE DIRECTIVE PLANNING Q5 YRS  12/22/2016     INFLUENZA VACCINE (1) 09/01/2018     FALL RISK ASSESSMENT  09/15/2018     PHQ-2 Q1 YR  09/15/2018     COLON CANCER SCREEN (SYSTEM ASSIGNED)  05/26/2021     LIPID SCREEN Q5 YR MALE (SYSTEM ASSIGNED)  09/10/2023     TETANUS IMMUNIZATION (SYSTEM ASSIGNED)  09/15/2026     PNEUMOCOCCAL  Completed     Labs reviewed in EPIC  BP Readings from Last 3 Encounters:   09/18/18 136/82   09/06/18 128/72   09/15/17 139/73    Wt Readings from Last 3 Encounters:   09/18/18 148 lb 9.6 oz (67.4 kg)   09/06/18 152 lb (68.9 kg)   09/17/18 150 lb (68 kg)                  Patient Active Problem List   Diagnosis     IBS (irritable bowel syndrome)     Hyperlipidemia LDL goal <130     Advance care planning     AK (actinic keratosis)     Family history of prostate cancer     Cervical stenosis of spinal canal      Essential hypertension with goal blood pressure less than 140/90     Wears hearing aid     Past Surgical History:   Procedure Laterality Date     HEMORRHOIDECTOMY BANDING  2017     ROTATOR CUFF REPAIR RT/LT  3/2012    right  - dr. benavidez     SURGICAL HISTORY OF -   disc surgery     SURGICAL HISTORY OF -   back surgery       Social History   Substance Use Topics     Smoking status: Former Smoker     Packs/day: 0.50     Years: 10.00     Types: Cigarettes     Smokeless tobacco: Never Used      Comment: 35 years      Alcohol use 0.0 oz/week     0 Standard drinks or equivalent per week      Comment: glass of wine per day      Family History   Problem Relation Age of Onset     HEART DISEASE Father      Cerebrovascular Disease Father      Prostate Cancer Brother      had surgery         Current Outpatient Prescriptions   Medication Sig Dispense Refill     ASPIRIN 81 MG PO TABS 1 TABLET DAILY*       CALCIUM 600/VITAMIN D 600-400 MG-UNIT PO TABS 1 Tablet every other day       calcium carbonate (TUMS) 500 MG chewable tablet Take 1 tablet (500 mg) by mouth 2 times daily 100 tablet 3     cholecalciferol (VITAMIN D) 1000 UNIT tablet Take 1 tablet (1,000 Units) by mouth daily 100 tablet 3     Cyanocobalamin (B-12 PO) Take 400 mg by mouth       CYCLOBENZAPRINE HCL PO Take 10 mg by mouth daily as needed for muscle spasms       losartan (COZAAR) 50 MG tablet Take 1 tablet (50 mg) by mouth daily for blood pressure. 90 tablet 3     Magnesium 400 MG TABS Take by mouth three times a week       MELOXICAM PO Take 7.5 mg by mouth daily        MULTI-VITAMIN PO 1 Tablet every day       Omega-3 Fatty Acids (OMEGA-3 FISH OIL) 1200 MG CAPS Take by mouth 2 times daily       Psyllium (METAMUCIL PO) Take by mouth every morning       Saccharomyces boulardii (PROBIOTIC) 250 MG CAPS Take by mouth daily       simvastatin (ZOCOR) 20 MG tablet TAKE 1 TABLET (20 MG) BY MOUTH EVERY EVENING 90 tablet 3     vitamin E 400 UNIT capsule Take by mouth three  "times a week       Zinc Sulfate (ZINC 15 PO) Take by mouth three times a week       Allergies   Allergen Reactions     Lisinopril      Cough       Recent Labs   Lab Test  09/10/18   0705  09/13/17   0655  09/02/16   0650   09/12/14   0817  09/11/13   0825   12/22/10   0848   LDL  91  91  91   < >  91  93   < >  82   HDL  73  84  75   < >  84  65   < >  61   TRIG  77  48  49   < >  64  58   < >  49   ALT   --    --   22   --   30  22   < >  30   CR  0.90  0.90  0.88   < >  0.92  0.85   < >  0.84   GFRESTIMATED  82  82  86   < >  81  89   < >  >90   GFRESTBLACK  >90  >90  >90  African American GFR Calc     < >  >90   GFR Calc    >90   < >  >90   POTASSIUM  4.4  4.1  4.3   < >  5.4*  4.7   < >  5.3   TSH   --    --    --    --    --   1.87   --   1.11    < > = values in this interval not displayed.        Pneumonia Vaccine:Adults age 65+ who received Pneumovax (PPSV23) at 65 years or older: Should be given PCV13 > 1 year after their most recent PPSV23    ROS:  Constitutional, HEENT, cardiovascular, pulmonary, gi and gu systems are negative, except as otherwise noted.    OBJECTIVE:   /82  Pulse 63  Temp 97.7  F (36.5  C) (Oral)  Resp 20  Ht 5' 5.5\" (1.664 m)  Wt 148 lb 9.6 oz (67.4 kg)  SpO2 100%  BMI 24.35 kg/m2 Estimated body mass index is 24.35 kg/(m^2) as calculated from the following:    Height as of this encounter: 5' 5.5\" (1.664 m).    Weight as of this encounter: 148 lb 9.6 oz (67.4 kg).  EXAM:   GENERAL: elderly, alert, well nourished, well hydrated, no distress  HENT: ear canals- normal; TMs- normal; Nose- normal; Mouth- no ulcers, no lesions, missing dentition  NECK: no tenderness, no adenopathy, no asymmetry, no masses, no stiffness; thyroid- normal to palpation  RESP: lungs clear to auscultation - no rales, no rhonchi, no wheezes  CV: regular rates and rhythm, normal S1 S2, no S3 or S4 and no murmur, no click or rub, normal pulses  ABDOMEN: soft, no tenderness, no  " hepatosplenomegaly, no masses, normal bowel sounds  MS: extremities- no gross deformities noted, no edema  SKIN: no suspicious lesions, no rashes, age related skin changes with seborrheic keratosis and no actinic keratosis.    NEURO: strength and tone- normal, sensory exam- grossly normal, reflexes- symmetric  BACK: no CVA tenderness, no paralumbar tenderness  MENTAL STATUS EXAM:  Appearance/Behavior: no apparent distress, neatly groomed, dressed appropriately for weather, appears stated age and is not frail-appearing  Speech: normal  Mood/Affect: normal affect  Insight: Good     Diagnostic Test Results:  Results for orders placed or performed in visit on 09/10/18   Renal panel   Result Value Ref Range    Sodium 141 133 - 144 mmol/L    Potassium 4.4 3.4 - 5.3 mmol/L    Chloride 106 94 - 109 mmol/L    Carbon Dioxide 27 20 - 32 mmol/L    Anion Gap 8 3 - 14 mmol/L    Glucose 99 70 - 99 mg/dL    Urea Nitrogen 17 7 - 30 mg/dL    Creatinine 0.90 0.66 - 1.25 mg/dL    GFR Estimate 82 >60 mL/min/1.7m2    GFR Estimate If Black >90 >60 mL/min/1.7m2    Calcium 8.9 8.5 - 10.1 mg/dL    Phosphorus 2.8 2.5 - 4.5 mg/dL    Albumin 3.4 3.4 - 5.0 g/dL   Lipid panel reflex to direct LDL Fasting   Result Value Ref Range    Cholesterol 179 <200 mg/dL    Triglycerides 77 <150 mg/dL    HDL Cholesterol 73 >39 mg/dL    LDL Cholesterol Calculated 91 <100 mg/dL    Non HDL Cholesterol 106 <130 mg/dL   CBC with platelets   Result Value Ref Range    WBC 5.2 4.0 - 11.0 10e9/L    RBC Count 4.00 (L) 4.4 - 5.9 10e12/L    Hemoglobin 13.2 (L) 13.3 - 17.7 g/dL    Hematocrit 39.1 (L) 40.0 - 53.0 %    MCV 98 78 - 100 fl    MCH 33.0 26.5 - 33.0 pg    MCHC 33.8 31.5 - 36.5 g/dL    RDW 12.4 10.0 - 15.0 %    Platelet Count 233 150 - 450 10e9/L   UA reflex to Microscopic and Culture   Result Value Ref Range    Color Urine Yellow     Appearance Urine Clear     Glucose Urine Negative NEG^Negative mg/dL    Bilirubin Urine Negative NEG^Negative    Ketones Urine  Negative NEG^Negative mg/dL    Specific Gravity Urine 1.010 1.003 - 1.035    Blood Urine Negative NEG^Negative    pH Urine 6.0 5.0 - 7.0 pH    Protein Albumin Urine Negative NEG^Negative mg/dL    Urobilinogen Urine 0.2 0.2 - 1.0 EU/dL    Nitrite Urine Negative NEG^Negative    Leukocyte Esterase Urine Negative NEG^Negative    Source Midstream Urine    Albumin Random Urine Quantitative with Creat Ratio   Result Value Ref Range    Creatinine Urine 80 mg/dL    Albumin Urine mg/L 7 mg/L    Albumin Urine mg/g Cr 8.20 0 - 17 mg/g Cr   PSA, screen   Result Value Ref Range    PSA 2.59 0 - 4 ug/L       ASSESSMENT / PLAN:       ICD-10-CM    1. Normal physical exam, routine Z00.00 Colonoscopy scheduled for rectal bleeding.    2. Hyperlipidemia LDL goal <130 E78.5 simvastatin (ZOCOR) 20 MG tablet   3. Essential hypertension with goal blood pressure less than 140/90 I10 Some orthostatic symptoms at home.    4. Irritable bowel syndrome with constipation K58.1 Stable on stool softener   5. Family history of prostate cancer Z80.42 Normal PSA   6. Hypocalcemia E83.51 calcium carbonate (TUMS) 500 MG chewable tablet   7. Need for prophylactic vaccination and inoculation against influenza Z23 FLU VACCINE, INCREASED ANTIGEN, PRESV FREE, AGE 65+ [92556]     Vaccine Administration, Initial [88449]   8. Cervical stenosis of spinal canal M48.02 Limits some of his activity. Stable and follow up with neurologist in [place]   9. Wears hearing aid Z97.4 Doing well       End of Life Planning:  Patient currently has an advanced directive: Yes.  Practitioner is supportive of decision.    COUNSELING:  Reviewed preventive health counseling, as reflected in patient instructions       Regular exercise       Healthy diet/nutrition       Dental care       Colon cancer screening       Prostate cancer screening    BP Readings from Last 1 Encounters:   09/18/18 136/82     Estimated body mass index is 24.35 kg/(m^2) as calculated from the following:     "Height as of this encounter: 5' 5.5\" (1.664 m).    Weight as of this encounter: 148 lb 9.6 oz (67.4 kg).           reports that he has quit smoking. His smoking use included Cigarettes. He has a 5.00 pack-year smoking history. He has never used smokeless tobacco.      Appropriate preventive services were discussed with this patient, including applicable screening as appropriate for cardiovascular disease, diabetes, osteopenia/osteoporosis, and glaucoma.  As appropriate for age/gender, discussed screening for colorectal cancer, prostate cancer, breast cancer, and cervical cancer. Checklist reviewing preventive services available has been given to the patient.    Reviewed patients plan of care and provided an AVS. The Basic Care Plan (routine screening as documented in Health Maintenance) for Alonso meets the Care Plan requirement. This Care Plan has been established and reviewed with the Patient.    Counseling Resources:  ATP IV Guidelines  Pooled Cohorts Equation Calculator  Breast Cancer Risk Calculator  FRAX Risk Assessment  ICSI Preventive Guidelines  Dietary Guidelines for Americans, 2010  USDA's MyPlate  ASA Prophylaxis  Lung CA Screening    Yvonne Granado MD  [facility]  "

## 2018-09-18 NOTE — MR AVS SNAPSHOT
After Visit Summary   9/18/2018    Aolnso Churchill    MRN: [id number]           Patient Information     Date Of Birth          1945        Visit Information        Provider Department      9/18/2018 11:00 AM Yvonne Granado MD [facility]        Today's Diagnoses     Normal physical exam, routine    -  1    Hyperlipidemia LDL goal <130        Essential hypertension with goal blood pressure less than 140/90        Irritable bowel syndrome with constipation        Family history of prostate cancer        Hypocalcemia        Need for prophylactic vaccination and inoculation against influenza        Cervical stenosis of spinal canal        Wears hearing aid          Care Instructions      Preventive Health Recommendations:   Male Ages 65 and over    Yearly exam:             See your health care provider every year in order to  o   Review health changes.   o   Discuss preventive care.    o   Review your medicines if your doctor has prescribed any.    Talk with your health care provider about whether you should have a test to screen for prostate cancer (PSA).    Every 3 years, have a diabetes test (fasting glucose). If you are at risk for diabetes, you should have this test more often.    Every 5 years, have a cholesterol test. Have this test more often if you are at risk for high cholesterol or heart disease.     Every 10 years, have a colonoscopy. Or, have a yearly FIT test (stool test). These exams will check for colon cancer.    Talk to with your health care provider about screening for Abdominal Aortic Aneurysm if you have a family history of AAA or have a history of smoking.    Shots:     Get a flu shot each year.     Get a tetanus shot every 10 years.     Talk to your doctor about your pneumonia vaccines. There are now two you should receive - Pneumovax (PPSV 23) and Prevnar (PCV 13).     Talk to your pharmacist about a shingles vaccine.     Talk to your doctor about the  hepatitis B vaccine.  Nutrition:     Eat at least 5 servings of fruits and vegetables each day.     Eat whole-grain bread, whole-wheat pasta and brown rice instead of white grains and rice.     Get adequate Calcium and Vitamin D.   Lifestyle    Exercise for at least 150 minutes a week (30 minutes a day, 5 days a week). This will help you control your weight and prevent disease.     Limit alcohol to one drink per day.     No smoking.     Wear sunscreen to prevent skin cancer.     See your dentist every six months for an exam and cleaning.     See your eye doctor every 1 to 2 years to screen for conditions such as glaucoma, macular degeneration, cataracts, etc           Follow-ups after your visit        Follow-up notes from your care team     Return in about 6 months (around 3/18/2019) for medication follow up, BP Recheck.      Your next 10 appointments already scheduled     Sep 20, 2018   Procedure with William Charles Duane, [place]   [facility]    [address]  [facility] [address] [phone]              Who to contact     If you have questions or need follow up information about today's clinic visit or your schedule please contact [facility] directly at [phone].  Normal or non-critical lab and imaging results will be communicated to you by MyChart, letter or phone within 4 business days after the clinic has received the results. If you do not hear from us within 7 days, please contact the clinic through [employer]â‚[employer] or phone. If you have a critical or abnormal lab result, we will notify you by phone as soon as possible.  Submit refill requests through [facility] or call your pharmacy and they will forward the refill request to us. Please allow 3 business days for your refill to be completed.          Additional Information About Your Visit        [facility] Information     [facility] gives you secure access to your electronic health record. If you see a primary care  "provider, you can also send messages to your care team and make appointments. If you have questions, please call your primary care clinic.  If you do not have a primary care provider, please call [phone] and they will assist you.        Care EveryWhere ID     This is your Care EveryWhere ID. This could be used by other organizations to access your [facility] medical records  [id number]        Your Vitals Were     Pulse Temperature Respirations Height Pulse Oximetry BMI (Body Mass Index)    63 97.7  F (36.5  C) (Oral) 20 5' 5.5\" (1.664 m) 100% 24.35 kg/m2       Blood Pressure from Last 3 Encounters:   09/18/18 136/82   09/06/18 128/72   09/15/17 139/73    Weight from Last 3 Encounters:   09/18/18 148 lb 9.6 oz (67.4 kg)   09/06/18 152 lb (68.9 kg)   09/17/18 150 lb (68 kg)              We Performed the Following     FLU VACCINE, INCREASED ANTIGEN, PRESV FREE, AGE 65+ [82014]     Vaccine Administration, Initial [97945]          Today's Medication Changes          These changes are accurate as of 9/18/18 12:39 PM.  If you have any questions, ask your nurse or doctor.               These medicines have changed or have updated prescriptions.        Dose/Directions    simvastatin 20 MG tablet   Commonly known as:  ZOCOR   This may have changed:  See the new instructions.   Used for:  Hyperlipidemia LDL goal <130   Changed by:  Yvonne Granado MD        Dose:  20 mg   Take 1 tablet (20 mg) by mouth At Bedtime   Quantity:  90 tablet   Refills:  3         Stop taking these medicines if you haven't already. Please contact your care team if you have questions.     aspirin 81 MG tablet   Stopped by:  Yvonne Granado MD                Where to get your medicines      These medications were sent to [facility] [id number] [place] [facility] - [address]  [address] [address]     Phone:  [phone]     calcium carbonate 500 MG chewable tablet    simvastatin 20 MG tablet                Primary Care " Provider Office Phone # Fax #    Yvonne Jazmin Granado [phone] [address]  [facility] [address]        Equal Access to Services     LITO MARAVILLA : Hadii claribel ku hadblancao Soomaali, waaxda luqadaha, qaybta kaalmada adeegyada, carmen castro laNiyahvalentin summers. So [facility] [phone].    ATENCIÓN: Si habla español, tiene a alcala disposición servicios gratuitos de asistencia lingüística. Llame al [phone].    We comply with applicable federal civil rights laws and [place] laws. We do not discriminate on the basis of race, color, national origin, age, disability, sex, sexual orientation, or gender identity.            Thank you!     Thank you for choosing [facility]  for your care. Our goal is always to provide you with excellent care. Hearing back from our patients is one way we can continue to improve our services. Please take a few minutes to complete the written survey that you may receive in the mail after your visit with us. Thank you!             Your Updated Medication List - Protect others around you: Learn how to safely use, store and throw away your medicines at www.disposemymeds.org.          This list is accurate as of 9/18/18 12:39 PM.  Always use your most recent med list.                   Brand Name Dispense Instructions for use Diagnosis    B-12 PO      Take 400 mg by mouth        calcium 600/vitamin D 600-400 MG-UNIT Tabs   Generic drug:  Calcium Carbonate-Vitamin D3      1 Tablet every other day        calcium carbonate 500 MG chewable tablet    TUMS    100 tablet    Take 1 tablet (500 mg) by mouth 2 times daily    Hypocalcemia       cholecalciferol 1000 UNIT tablet    vitamin D3    100 tablet    Take 1 tablet (1,000 Units) by mouth daily    Vitamin D deficiency       CYCLOBENZAPRINE HCL PO      Take 10 mg by mouth daily as needed for muscle spasms        losartan 50 MG tablet    COZAAR    90 tablet    Take 1 tablet (50 mg) by mouth daily for  blood pressure.    Essential hypertension with goal blood pressure less than 140/90       Magnesium 400 MG Tabs      Take by mouth three times a week        MELOXICAM PO      Take 7.5 mg by mouth daily        METAMUCIL PO      Take by mouth every morning        MULTI-VITAMIN PO      1 Tablet every day        Omega-3 Fish Oil 1200 MG Caps      Take by mouth 2 times daily        Probiotic 250 MG Caps      Take by mouth daily        simvastatin 20 MG tablet    ZOCOR    90 tablet    Take 1 tablet (20 mg) by mouth At Bedtime    Hyperlipidemia LDL goal <130       vitamin E 400 UNIT capsule      Take by mouth three times a week        ZINC 15 PO      Take by mouth three times a week

## 2018-09-20 ENCOUNTER — SURGERY (OUTPATIENT)
Age: 73
End: 2018-09-20

## 2018-09-20 ENCOUNTER — HOSPITAL ENCOUNTER (OUTPATIENT)
Facility: AMBULATORY SURGERY CENTER | Age: 73
Discharge: HOME | End: 2018-09-20
Attending: INTERNAL MEDICINE | Admitting: INTERNAL MEDICINE
Payer: COMMERCIAL

## 2018-09-20 VITALS
SYSTOLIC BLOOD PRESSURE: 126 MMHG | HEIGHT: 67 IN | OXYGEN SATURATION: 99 % | TEMPERATURE: 97.8 F | BODY MASS INDEX: 23.54 KG/M2 | WEIGHT: 150 LBS | HEART RATE: 67 BPM | RESPIRATION RATE: 18 BRPM | DIASTOLIC BLOOD PRESSURE: 75 MMHG

## 2018-09-20 LAB — COLONOSCOPY: NORMAL

## 2018-09-20 PROCEDURE — 45378 DIAGNOSTIC COLONOSCOPY: CPT

## 2018-09-20 PROCEDURE — G8907 PT DOC NO EVENTS ON DISCHARG: HCPCS

## 2018-09-20 PROCEDURE — G8918 PT W/O PREOP ORDER IV AB PRO: HCPCS

## 2018-09-20 RX ORDER — SODIUM CHLORIDE, SODIUM LACTATE, POTASSIUM CHLORIDE, CALCIUM CHLORIDE 600; 310; 30; 20 MG/100ML; MG/100ML; MG/100ML; MG/100ML
INJECTION, SOLUTION INTRAVENOUS CONTINUOUS PRN
Status: DISCONTINUED | OUTPATIENT
Start: 2018-09-20 | End: 2018-09-20 | Stop reason: HOSPADM

## 2018-09-20 RX ORDER — ONDANSETRON 2 MG/ML
4 INJECTION INTRAMUSCULAR; INTRAVENOUS
Status: DISCONTINUED | OUTPATIENT
Start: 2018-09-20 | End: 2018-09-21 | Stop reason: HOSPADM

## 2018-09-20 RX ORDER — FENTANYL CITRATE 50 UG/ML
INJECTION, SOLUTION INTRAMUSCULAR; INTRAVENOUS PRN
Status: DISCONTINUED | OUTPATIENT
Start: 2018-09-20 | End: 2018-09-20 | Stop reason: HOSPADM

## 2018-09-20 RX ORDER — LIDOCAINE 40 MG/G
CREAM TOPICAL
Status: DISCONTINUED | OUTPATIENT
Start: 2018-09-20 | End: 2018-09-21 | Stop reason: HOSPADM

## 2018-09-20 RX ADMIN — SODIUM CHLORIDE, SODIUM LACTATE, POTASSIUM CHLORIDE, CALCIUM CHLORIDE 75 ML/HR: 600; 310; 30; 20 INJECTION, SOLUTION INTRAVENOUS at 07:16

## 2018-09-20 RX ADMIN — FENTANYL CITRATE 50 MCG: 50 INJECTION, SOLUTION INTRAMUSCULAR; INTRAVENOUS at 07:15

## 2018-09-20 RX ADMIN — FENTANYL CITRATE 100 MCG: 50 INJECTION, SOLUTION INTRAMUSCULAR; INTRAVENOUS at 06:57

## 2018-11-09 DIAGNOSIS — I10 ESSENTIAL HYPERTENSION WITH GOAL BLOOD PRESSURE LESS THAN 140/90: ICD-10-CM

## 2018-11-09 NOTE — TELEPHONE ENCOUNTER
"Requested Prescriptions   Pending Prescriptions Disp Refills     losartan (COZAAR) 50 MG tablet [Pharmacy Med Name: LOSARTAN POTASSIUM 50 MG TAB] 90 tablet 2    Last Written Prescription Date:  9/22/17  Last Fill Quantity: 90,  # refills: 3   Last Office Visit with [facility], [facility] or [facility] prescribing provider:  9/18/2018     Future Office Visit:      Sig: TAKE 1 TABLET BY MOUTH ONCE DAILY FOR BLOOD PRESSURE    Angiotensin-II Receptors Passed    11/9/2018  1:28 PM       Passed - Blood pressure under 140/90 in past 12 months    BP Readings from Last 3 Encounters:   09/20/18 126/75   09/18/18 136/82   09/06/18 128/72                Passed - Recent (12 mo) or future (30 days) visit within the authorizing provider's specialty    Patient had office visit in the last 12 months or has a visit in the next 30 days with authorizing provider or within the authorizing provider's specialty.  See \"Patient Info\" tab in inbasket, or \"Choose Columns\" in Meds & Orders section of the refill encounter.             Passed - Patient is age 18 or older       Passed - Normal serum creatinine on file in past 12 months    Recent Labs   Lab Test  09/10/18   0705   CR  0.90            Passed - Normal serum potassium on file in past 12 months    Recent Labs   Lab Test  09/10/18   0705   POTASSIUM  4.4                      "

## 2018-11-13 RX ORDER — LOSARTAN POTASSIUM 50 MG/1
TABLET ORAL
Qty: 90 TABLET | Refills: 2 | Status: SHIPPED | OUTPATIENT
Start: 2018-11-13 | End: 2019-07-01

## 2018-11-13 NOTE — TELEPHONE ENCOUNTER
Routing refill request to provider for review/approval because:  Drug interaction warning due to lisinopril allergy.      Mali Moulton RN, BSN

## 2019-01-14 ENCOUNTER — TRANSFERRED RECORDS (OUTPATIENT)
Dept: HEALTH INFORMATION MANAGEMENT | Facility: CLINIC | Age: 74
End: 2019-01-14

## 2019-01-16 ENCOUNTER — TRANSFERRED RECORDS (OUTPATIENT)
Dept: HEALTH INFORMATION MANAGEMENT | Facility: CLINIC | Age: 74
End: 2019-01-16

## 2019-01-16 ENCOUNTER — MEDICAL CORRESPONDENCE (OUTPATIENT)
Dept: HEALTH INFORMATION MANAGEMENT | Facility: CLINIC | Age: 74
End: 2019-01-16

## 2019-01-19 ENCOUNTER — TRANSFERRED RECORDS (OUTPATIENT)
Dept: HEALTH INFORMATION MANAGEMENT | Facility: CLINIC | Age: 74
End: 2019-01-19

## 2019-02-05 ENCOUNTER — TRANSFERRED RECORDS (OUTPATIENT)
Dept: HEALTH INFORMATION MANAGEMENT | Facility: CLINIC | Age: 74
End: 2019-02-05

## 2019-07-01 DIAGNOSIS — I10 ESSENTIAL HYPERTENSION WITH GOAL BLOOD PRESSURE LESS THAN 140/90: ICD-10-CM

## 2019-07-01 RX ORDER — LOSARTAN POTASSIUM 50 MG/1
TABLET ORAL
Qty: 90 TABLET | Refills: 2 | Status: SHIPPED | OUTPATIENT
Start: 2019-07-01 | End: 2019-09-19

## 2019-07-01 NOTE — TELEPHONE ENCOUNTER
"Requested Prescriptions   Pending Prescriptions Disp Refills     losartan (COZAAR) 50 MG tablet [Pharmacy Med Name: LOSARTAN POTASSIUM 50 MG TAB] 90 tablet 2     Sig: TAKE 1 TABLET BY MOUTH ONCE DAILY FOR BLOOD PRESSURE       Angiotensin-II Receptors Passed - 7/1/2019  7:32 AM        Passed - Blood pressure under 140/90 in past 12 months     BP Readings from Last 3 Encounters:   09/20/18 126/75   09/18/18 136/82   09/06/18 128/72                 Passed - Recent (12 mo) or future (30 days) visit within the authorizing provider's specialty     Patient had office visit in the last 12 months or has a visit in the next 30 days with authorizing provider or within the authorizing provider's specialty.  See \"Patient Info\" tab in inbasket, or \"Choose Columns\" in Meds & Orders section of the refill encounter.              Passed - Medication is active on med list        Passed - Patient is age 18 or older        Passed - Normal serum creatinine on file in past 12 months     Recent Labs   Lab Test 09/10/18  0705   CR 0.90             Passed - Normal serum potassium on file in past 12 months     Recent Labs   Lab Test 09/10/18  0705   POTASSIUM 4.4                  Last Written Prescription Date:  11/13/18  Last Fill Quantity: 90,  # refills: 2   Last office visit: 9/18/2018 with prescribing provider:  Yvonne Granado     Future Office Visit:      "

## 2019-09-19 ENCOUNTER — OFFICE VISIT (OUTPATIENT)
Dept: FAMILY MEDICINE | Facility: CLINIC | Age: 74
End: 2019-09-19
Payer: MEDICARE

## 2019-09-19 VITALS
RESPIRATION RATE: 18 BRPM | BODY MASS INDEX: 24.59 KG/M2 | SYSTOLIC BLOOD PRESSURE: 138 MMHG | DIASTOLIC BLOOD PRESSURE: 71 MMHG | WEIGHT: 153 LBS | OXYGEN SATURATION: 99 % | TEMPERATURE: 98 F | HEIGHT: 66 IN | HEART RATE: 63 BPM

## 2019-09-19 DIAGNOSIS — E78.5 HYPERLIPIDEMIA LDL GOAL <130: ICD-10-CM

## 2019-09-19 DIAGNOSIS — Z87.891 PERSONAL HISTORY OF TOBACCO USE: ICD-10-CM

## 2019-09-19 DIAGNOSIS — R79.1 ELEVATED INR: ICD-10-CM

## 2019-09-19 DIAGNOSIS — R23.3 EASY BRUISING: ICD-10-CM

## 2019-09-19 DIAGNOSIS — D69.0 ALLERGIC PURPURA (H): ICD-10-CM

## 2019-09-19 DIAGNOSIS — Z00.00 ENCOUNTER FOR MEDICARE ANNUAL WELLNESS EXAM: Primary | ICD-10-CM

## 2019-09-19 DIAGNOSIS — I10 ESSENTIAL HYPERTENSION WITH GOAL BLOOD PRESSURE LESS THAN 140/90: ICD-10-CM

## 2019-09-19 LAB
ALBUMIN SERPL-MCNC: 3.6 G/DL (ref 3.4–5)
ALP SERPL-CCNC: 69 U/L (ref 40–150)
ALT SERPL W P-5'-P-CCNC: 22 U/L (ref 0–70)
ANION GAP SERPL CALCULATED.3IONS-SCNC: 2 MMOL/L (ref 3–14)
AST SERPL W P-5'-P-CCNC: 19 U/L (ref 0–45)
BASOPHILS # BLD AUTO: 0 10E9/L (ref 0–0.2)
BASOPHILS NFR BLD AUTO: 0.6 %
BILIRUB SERPL-MCNC: 0.8 MG/DL (ref 0.2–1.3)
BUN SERPL-MCNC: 16 MG/DL (ref 7–30)
CALCIUM SERPL-MCNC: 9 MG/DL (ref 8.5–10.1)
CHLORIDE SERPL-SCNC: 110 MMOL/L (ref 94–109)
CHOLEST SERPL-MCNC: 173 MG/DL
CO2 SERPL-SCNC: 30 MMOL/L (ref 20–32)
CREAT SERPL-MCNC: 0.88 MG/DL (ref 0.66–1.25)
CRP SERPL-MCNC: <2.9 MG/L (ref 0–8)
DIFFERENTIAL METHOD BLD: ABNORMAL
EOSINOPHIL # BLD AUTO: 0.2 10E9/L (ref 0–0.7)
EOSINOPHIL NFR BLD AUTO: 4.2 %
ERYTHROCYTE [DISTWIDTH] IN BLOOD BY AUTOMATED COUNT: 12.4 % (ref 10–15)
ERYTHROCYTE [SEDIMENTATION RATE] IN BLOOD BY WESTERGREN METHOD: 14 MM/H (ref 0–20)
GFR SERPL CREATININE-BSD FRML MDRD: 84 ML/MIN/{1.73_M2}
GLUCOSE SERPL-MCNC: 105 MG/DL (ref 70–99)
HCT VFR BLD AUTO: 41.7 % (ref 40–53)
HDLC SERPL-MCNC: 83 MG/DL
HGB BLD-MCNC: 13.9 G/DL (ref 13.3–17.7)
LDLC SERPL CALC-MCNC: 82 MG/DL
LYMPHOCYTES # BLD AUTO: 1.8 10E9/L (ref 0.8–5.3)
LYMPHOCYTES NFR BLD AUTO: 35.2 %
MCH RBC QN AUTO: 33.1 PG (ref 26.5–33)
MCHC RBC AUTO-ENTMCNC: 33.3 G/DL (ref 31.5–36.5)
MCV RBC AUTO: 99 FL (ref 78–100)
MONOCYTES # BLD AUTO: 0.6 10E9/L (ref 0–1.3)
MONOCYTES NFR BLD AUTO: 12.1 %
NEUTROPHILS # BLD AUTO: 2.5 10E9/L (ref 1.6–8.3)
NEUTROPHILS NFR BLD AUTO: 47.9 %
NONHDLC SERPL-MCNC: 90 MG/DL
PLATELET # BLD AUTO: 256 10E9/L (ref 150–450)
POTASSIUM SERPL-SCNC: 4.8 MMOL/L (ref 3.4–5.3)
PROT SERPL-MCNC: 7.1 G/DL (ref 6.8–8.8)
RBC # BLD AUTO: 4.2 10E12/L (ref 4.4–5.9)
SODIUM SERPL-SCNC: 142 MMOL/L (ref 133–144)
TRIGL SERPL-MCNC: 42 MG/DL
WBC # BLD AUTO: 5.2 10E9/L (ref 4–11)

## 2019-09-19 PROCEDURE — 85652 RBC SED RATE AUTOMATED: CPT | Performed by: FAMILY MEDICINE

## 2019-09-19 PROCEDURE — G0439 PPPS, SUBSEQ VISIT: HCPCS | Performed by: FAMILY MEDICINE

## 2019-09-19 PROCEDURE — 86140 C-REACTIVE PROTEIN: CPT | Performed by: FAMILY MEDICINE

## 2019-09-19 PROCEDURE — 85025 COMPLETE CBC W/AUTO DIFF WBC: CPT | Performed by: FAMILY MEDICINE

## 2019-09-19 PROCEDURE — 80061 LIPID PANEL: CPT | Performed by: FAMILY MEDICINE

## 2019-09-19 PROCEDURE — 36415 COLL VENOUS BLD VENIPUNCTURE: CPT | Performed by: FAMILY MEDICINE

## 2019-09-19 PROCEDURE — G0008 ADMIN INFLUENZA VIRUS VAC: HCPCS | Performed by: FAMILY MEDICINE

## 2019-09-19 PROCEDURE — 80053 COMPREHEN METABOLIC PANEL: CPT | Performed by: FAMILY MEDICINE

## 2019-09-19 PROCEDURE — 90662 IIV NO PRSV INCREASED AG IM: CPT | Performed by: FAMILY MEDICINE

## 2019-09-19 RX ORDER — SIMVASTATIN 20 MG
20 TABLET ORAL AT BEDTIME
Qty: 90 TABLET | Refills: 3 | Status: SHIPPED | OUTPATIENT
Start: 2019-09-19 | End: 2020-09-22

## 2019-09-19 RX ORDER — LOSARTAN POTASSIUM 50 MG/1
TABLET ORAL
Qty: 90 TABLET | Refills: 2 | Status: SHIPPED | OUTPATIENT
Start: 2019-09-19 | End: 2020-05-19

## 2019-09-19 ASSESSMENT — MIFFLIN-ST. JEOR: SCORE: 1363.81

## 2019-09-19 ASSESSMENT — ACTIVITIES OF DAILY LIVING (ADL): CURRENT_FUNCTION: NO ASSISTANCE NEEDED

## 2019-09-19 ASSESSMENT — PAIN SCALES - GENERAL: PAINLEVEL: NO PAIN (0)

## 2019-09-19 NOTE — PATIENT INSTRUCTIONS
At [facility], we strive to deliver an exceptional experience to you, every time we see you.  If you receive a survey in the mail, please send us back your thoughts. We really do value your feedback.    Based on your medical history, these are the current health maintenance/preventive care services that you are due for (some may have been done at this visit.)  Health Maintenance Due   Topic Date Due     ZOSTER IMMUNIZATION (2 of 3) 06/23/2008     AORTIC ANEURYSM SCREENING (SYSTEM ASSIGNED)  04/03/2009     ADVANCE CARE PLANNING  12/22/2016     PHQ-2  01/01/2019     INFLUENZA VACCINE (1) 09/01/2019     FALL RISK ASSESSMENT  09/18/2019     MEDICARE ANNUAL WELLNESS VISIT  09/18/2019         Suggested websites for health information:  Www.[facility].org : Up to date and easily searchable information on multiple topics.  Www.medlineplus.gov : medication info, interactive tutorials, watch real surgeries online  Www.familydoctor.org : good info from the Academy of Family Physicians  Www.cdc.gov : public health info, travel advisories, epidemics (H1N1)  Www.aap.org : children's health info, normal development, vaccinations  Www.health.[facility].[place].us : [place] dept of health, public health issues in [place], N1N1    Your care team:                            Family Medicine Internal Medicine   MD Dawson Steele MD Shantel Branch-Fleming, MD Katya Georgiev PA-C Nam Ho, MD Pediatrics   ABHIJEET De Leon, MD Jimena Andino CNP, MD Deborah Mielke, MD Kim Thein, APRN CNP      Clinic hours: Monday - Thursday 7 am-7 pm; Fridays 7 am-5 pm.   Urgent care: Monday - Friday 11 am-9 pm; Saturday and Sunday 9 am-5 pm.  Pharmacy : Monday -Thursday 8 am-8 pm; Friday 8 am-6 pm; Saturday and Sunday 9 am-5 pm.     Clinic: [phone]   Pharmacy: [phone]      Patient Education   Personalized Prevention Plan  You are due for the preventive  services outlined below.  Your care team is available to assist you in scheduling these services.  If you have already completed any of these items, please share that information with your care team to update in your medical record.  Health Maintenance Due   Topic Date Due     Zoster (Shingles) Vaccine (2 of 3) 06/23/2008     AORTIC ANEURYSM SCREENING (SYSTEM ASSIGNED)  04/03/2009     Discuss Advance Care Planning  12/22/2016     PHQ-2  01/01/2019     Flu Vaccine (1) 09/01/2019     FALL RISK ASSESSMENT  09/18/2019     Annual Wellness Visit  09/18/2019        Patient Education     Established High Blood Pressure    High blood pressure (hypertension) is a chronic disease. Often, healthcare providers don t know what causes it. But it can be caused by certain health conditions and medicines.  If you have high blood pressure, you may not have any symptoms. If you do have symptoms, they may include headache, dizziness, changes in your vision, chest pain, and shortness of breath. But even without symptoms, high blood pressure that s not treated raises your risk for heart attack, heart failure, and stroke. High blood pressure is a serious health risk and shouldn t be ignored.  Blood pressure measurements are given as 2 numbers. Systolic blood pressure is the upper number. This is the pressure when the heart contracts. Diastolic blood pressure is the lower number. This is the pressure when the heart relaxes between beats. You will see your blood pressure readings written together. For example, a person with a systolic pressure of 118 and a diastolic pressure of 78 will have 118/78 written in the medical record.  Blood pressure is categorized as normal, elevated, or stage 1 or stage 2 high blood pressure:    Normal blood pressure is systolic of less than 120 and diastolic of less than 80 (120/80)    Elevated blood pressure is systolic of 120 to 129 and diastolic less than 80    Stage 1 high blood pressure is systolic is 130  to 139 or diastolic between 80 to 89    Stage 2 high blood pressure is when systolic is 140 or higher or the diastolic is 90 or higher  Home care  If you have high blood pressure, follow these home care guidelines to help lower your blood pressure. If you are taking medicines for high blood pressure, these methods may reduce or end your need for medicines in the future.    Start a weight-loss program if you are overweight.    Cut back on how much salt you get in your diet. Here s how to do this:  ? Don t eat foods that have a lot of salt. These include olives, pickles, smoked meats, and salted potato chips.  ? Don t add salt to your food at the table.  ? Use only small amounts of salt when cooking.    Start an exercise program. Talk with your healthcare provider about the type of exercise program that would be best for you. It doesn't have to be hard. Even brisk walking for 20 minutes 3 times a week is a good form of exercise.    Don t take medicines that stimulate the heart. This includes many over-the-counter cold and sinus decongestant pills and sprays, as well as diet pills. Check the warnings about high blood pressure on the label. Before buying any over-the-counter medicines or supplements, always ask the pharmacist about the product's potential interaction with your high blood pressure and your high blood pressure medicines.    Stimulants such as amphetamine or cocaine could be deadly for someone with high blood pressure. Never take these.    Limit how much caffeine you get in your diet. Switch to caffeine-free products.    Stop smoking. If you are a long-time smoker, this can be hard. Talk to your healthcare provider about medicines and nicotine replacement options to help you. Also, enroll in a stop-smoking program to make it more likely that you will quit for good.    Learn how to handle stress. This is an important part of any program to lower blood pressure. Learn about relaxation methods like  meditation, yoga, or biofeedback.    If your provider prescribed medicines, take them exactly as directed. Missing doses may cause your blood pressure get out of control.    If you miss a dose or doses, check with your healthcare provider or pharmacist about what to do.    Consider buying an automatic blood pressure machine to check your blood pressure at home. Ask your provider for a recommendation. You can get one of these at most pharmacies.     The American Heart Association recommends the following guidelines for home blood pressure monitoring:    Don't smoke or drink coffee for 30 minutes before taking your blood pressure.    Go to the bathroom before the test.    Relax for 5 minutes before taking the measurement.    Sit with your back supported (don't sit on a couch or soft chair); keep your feet on the floor uncrossed. Place your arm on a solid flat surface (like a table) with the upper part of the arm at heart level. Place the middle of the cuff directly above the bend of the elbow. Check the monitor's instruction manual for an illustration.    Take multiple readings. When you measure, take 2 to 3 readings one minute apart and record all of the results.    Take your blood pressure at the same time every day, or as your healthcare provider recommends.    Record the date, time, and blood pressure reading.    Take the record with you to your next medical appointment. If your blood pressure monitor has a built-in memory, simply take the monitor with you to your next appointment.    Call your provider if you have several high readings. Don't be frightened by a single high blood pressure reading, but if you get several high readings, check in with your healthcare provider.    Note: When blood pressure reaches a systolic (top number) of 180 or higher OR diastolic (bottom number) of 110 or higher, seek emergency medical treatment.  Follow-up care  You will need to see your healthcare provider regularly. This is to  check your blood pressure and to make changes to your medicines. Make a follow-up appointment as directed. Bring the record of your home blood pressure readings to the appointment.  When to seek medical advice  Call your healthcare provider right away if any of these occur:    Blood pressure reaches a systolic (upper number) of 180 or higher OR a diastolic (bottom number) of 110 or higher    Chest pain or shortness of breath    Severe headache    Throbbing or rushing sound in the ears    Nosebleed    Sudden severe pain in your belly (abdomen)    Extreme drowsiness, confusion, or fainting    Dizziness or spinning sensation (vertigo)    Weakness of an arm or leg or one side of the face    You have problems speaking or seeing   Date Last Reviewed: 12/1/2016 2000-2018 [employer]. [address] [place] [address], [place] [address]. All rights reserved. This information is not intended as a substitute for professional medical care. Always follow your healthcare professional's instructions.           Patient Education     Bruises (Contusions)    A contusion is a bruise. A bruise happens when a blow to your body doesn't break the skin but does break blood vessels beneath the skin. Blood leaking from the broken vessels causes redness and swelling. As it heals, your bruise is likely to turn colors like purple, green, and yellow. This is normal. The bruise should fade in 2 or 3 weeks.  Factors that make you more likely to bruise  Almost everyone bruises now and then. Certain people do bruise more easily than others. You're more prone to bruising as you get older. That's because blood vessels become more fragile with age. You're also more likely to bruise if you have a clotting disorder such as hemophilia or take medicines that reduce clotting, including aspirin and coumadin.  When to go to the emergency room (ER)  Bruises almost always heal on their own without special treatment. But for some people, a bad bruise can  be serious. Seek medical care if you:    Have a clotting disorder such as hemophilia    Have cirrhosis or other serious liver disease    Take blood-thinning medicines such as warfarin  What to expect in the ER  A doctor will examine your bruise and ask about any health conditions you have. In some cases, you may have a test to check how well your blood clots. Other treatment will depend on your needs.  Follow-up care  Sometimes a bruise gets worse instead of better. It may become larger and more swollen. This can occur when your body walls off a small pool of blood under the skin (hematoma). In very rare cases, your doctor may need to drain extra blood from the area.  Tip:  Apply an ice pack or bag of frozen peas to a bruise. Keep a thin cloth between the ice or frozen peas and your skin. The cold can help reduce redness and swelling.   Date Last Reviewed: 12/1/2016 2000-2018 [employer]. [address] [place] [address], [place] [address]. All rights reserved. This information is not intended as a substitute for professional medical care. Always follow your healthcare professional's instructions.

## 2019-09-19 NOTE — PROGRESS NOTES
"SUBJECTIVE:   Alonso Churchill is a 74 year old male who presents for Preventive Visit.  Are you in the first 12 months of your Medicare coverage?  No    Healthy Habits:    In general, how would you rate your overall health?  Very good    Frequency of exercise:  2-3 days/week    Duration of exercise:  Greater than 60 minutes    Do you usually eat at least 4 servings of fruit and vegetables a day, include whole grains    & fiber and avoid regularly eating high fat or \"junk\" foods?  Yes    Taking medications regularly:  Yes    Barriers to taking medications:  None    Medication side effects:  None    Ability to successfully perform activities of daily living:  No assistance needed    Home Safety:  No safety concerns identified    Hearing Impairment:  No hearing concerns (current hearings aids)    In the past 6 months, have you been bothered by leaking of urine?  No    In general, how would you rate your overall mental or emotional health?  Excellent      PHQ-2 Total Score:    Additional concerns today:  Yes (going to [place], needs refills, sinus problem)    Do you feel safe in your environment? Yes    Do you have a Health Care Directive? Yes: Advance Directive has been received and scanned.    Fall risk  Fallen 2 or more times in the past year?: No  Any fall with injury in the past year?: No    Cognitive Screening   1) Repeat 3 items (Leader, Season, Table)    2) Clock draw: NORMAL  3) 3 item recall: Recalls 3 objects  Results: 3 items recalled: COGNITIVE IMPAIRMENT LESS LIKELY    Mini-CogTM Copyright NICKI Daniel. Licensed by the author for use in [facility]; reprinted with permission (nima@.[facility]). All rights reserved.        Reviewed and updated as needed this visit by clinical staff  Tobacco  Allergies  Meds  Med Hx  Surg Hx  Fam Hx  Soc Hx        Reviewed and updated as needed this visit by Provider        Social History     Tobacco Use     Smoking status: Former Smoker     Packs/day: 0.50     " Years: 10.00     Pack years: 5.00     Types: Cigarettes     Smokeless tobacco: Never Used     Tobacco comment: 35 years    Substance Use Topics     Alcohol use: Yes     Alcohol/week: 0.0 oz     Comment: glass of wine per day or less     If you drink alcohol do you typically have >3 drinks per day or >7 drinks per week? No    Alcohol Use 9/15/2017   Prescreen: >3 drinks/day or >7 drinks/week? The patient does not drink >3 drinks per day nor >7 drinks per week.   No flowsheet data found.        Hyperlipidemia Follow-Up      Are you having any of the following symptoms? (Select all that apply)  No complaints of shortness of breath, chest pain or pressure.  No increased sweating or nausea with activity.  No left-sided neck or arm pain.  No complaints of pain in calves when walking 1-2 blocks.    Are you regularly taking any medication or supplement to lower your cholesterol?   Yes- simvastatin    Are you having muscle aches or other side effects that you think could be caused by your cholesterol lowering medication?  No      Hypertension Follow-up      Do you check your blood pressure regularly outside of the clinic? No     Are you following a low salt diet? Yes    Are your blood pressures ever more than 140 on the top number (systolic) OR more   than 90 on the bottom number (diastolic), for example 140/90? No     Chronic easy bruising on arms mostly without injury.      Current providers sharing in care for this patient include:   Patient Care Team:  Yvonne Granado MD as PCP - General (Family Practice)  Yvonne Granado MD as Assigned PCP    The following health maintenance items are reviewed in Epic and correct as of today:  Health Maintenance   Topic Date Due     ZOSTER IMMUNIZATION (2 of 3) 06/23/2008     AORTIC ANEURYSM SCREENING (SYSTEM ASSIGNED)  04/03/2009     ADVANCE CARE PLANNING  12/22/2016     PHQ-2  01/01/2019     INFLUENZA VACCINE (1) 09/01/2019     FALL RISK ASSESSMENT  09/18/2019     MEDICARE  ANNUAL WELLNESS VISIT  09/18/2019     LIPID  09/10/2023     DTAP/TDAP/TD IMMUNIZATION (4 - Td) 09/15/2026     COLONOSCOPY  09/20/2028     PNEUMOCOCCAL IMMUNIZATION 65+ LOW/MEDIUM RISK  Completed     IPV IMMUNIZATION  Aged Out     MENINGITIS IMMUNIZATION  Aged Out     Lab work is in process  Labs reviewed in EPIC  BP Readings from Last 3 Encounters:   09/19/19 (!) 150/71   09/20/18 126/75   09/18/18 136/82    Wt Readings from Last 3 Encounters:   09/19/19 69.4 kg (153 lb)   09/17/18 68 kg (150 lb)   09/18/18 67.4 kg (148 lb 9.6 oz)                  Patient Active Problem List   Diagnosis     IBS (irritable bowel syndrome)     Hyperlipidemia LDL goal <130     Advance care planning     AK (actinic keratosis)     Family history of prostate cancer     Cervical stenosis of spinal canal     Essential hypertension with goal blood pressure less than 140/90     Wears hearing aid     Past Surgical History:   Procedure Laterality Date     COLONOSCOPY WITH CO2 INSUFFLATION N/A 9/20/2018    Procedure: COLONOSCOPY WITH CO2 INSUFFLATION;  Colonoscopy;  Surgeon: Duane, William Charles, MD;  Location: [facility] OR     HEMORRHOIDECTOMY BANDING  2017     ROTATOR CUFF REPAIR RT/LT  3/2012    right  - dr. benavidez     SURGICAL HISTORY OF -   disc surgery     SURGICAL HISTORY OF -   back surgery       Social History     Tobacco Use     Smoking status: Former Smoker     Packs/day: 0.50     Years: 10.00     Pack years: 5.00     Types: Cigarettes     Smokeless tobacco: Never Used     Tobacco comment: 35 years    Substance Use Topics     Alcohol use: Yes     Alcohol/week: 0.0 oz     Comment: glass of wine per day or less     Family History   Problem Relation Age of Onset     Heart Disease Father      Cerebrovascular Disease Father      Prostate Cancer Brother         had surgery         Current Outpatient Medications   Medication Sig Dispense Refill     calcium carbonate (TUMS) 500 MG chewable tablet Take 1 tablet (500 mg) by mouth 2 times daily 100  "tablet 3     cholecalciferol (VITAMIN D) 1000 UNIT tablet Take 1 tablet (1,000 Units) by mouth daily 100 tablet 3     Cyanocobalamin (B-12 PO) Take 400 mg by mouth       losartan (COZAAR) 50 MG tablet TAKE 1 TABLET BY MOUTH ONCE DAILY FOR BLOOD PRESSURE 90 tablet 2     Magnesium 400 MG TABS Take by mouth three times a week       MELOXICAM PO Take 7.5 mg by mouth daily        MULTI-VITAMIN PO 1 Tablet every day       Omega-3 Fatty Acids (OMEGA-3 FISH OIL) 1200 MG CAPS Take by mouth 2 times daily       Psyllium (METAMUCIL PO) Take by mouth every morning       simvastatin (ZOCOR) 20 MG tablet Take 1 tablet (20 mg) by mouth At Bedtime 90 tablet 3     vitamin E 400 UNIT capsule Take by mouth three times a week       Zinc Sulfate (ZINC 15 PO) Take by mouth three times a week       Allergies   Allergen Reactions     Lisinopril      Cough       Recent Labs   Lab Test 09/10/18  0705 09/13/17  0655 09/02/16  0650  09/12/14  0817 09/11/13  0825   LDL 91 91 91   < > 91 93   HDL 73 84 75   < > 84 65   TRIG 77 48 49   < > 64 58   ALT  --   --  22  --  30 22   CR 0.90 0.90 0.88   < > 0.92 0.85   GFRESTIMATED 82 82 86   < > 81 89   GFRESTBLACK >90 >90 >90  African American GFR Calc     < > >90   GFR Calc   >90   POTASSIUM 4.4 4.1 4.3   < > 5.4* 4.7   TSH  --   --   --   --   --  1.87    < > = values in this interval not displayed.      Pneumonia Vaccine:Adults age 65+ who received Pneumovax (PPSV23) at 65 years or older: Should be given PCV13 > 1 year after their most recent PPSV23      Review of Systems  Constitutional, HEENT, cardiovascular, pulmonary, gi and gu systems are negative, except as otherwise noted.    OBJECTIVE:   /71 (BP Location: Right arm, Patient Position: Chair, Cuff Size: Adult Regular)   Pulse 63   Temp 98  F (36.7  C) (Oral)   Resp 18   Ht 1.664 m (5' 5.5\")   Wt 69.4 kg (153 lb)   SpO2 99%   BMI 25.07 kg/m   Estimated body mass index is 25.07 kg/m  as calculated from the " "following:    Height as of this encounter: 1.664 m (5' 5.5\").    Weight as of this encounter: 69.4 kg (153 lb).  Physical Exam  GENERAL APPEARANCE: healthy, alert and no distress  EYES: Eyes grossly normal to inspection, PERRL and conjunctivae and sclerae normal  HENT: ear canals and TM's normal, nose and mouth without ulcers or lesions, oropharynx clear and oral mucous membranes moist  NECK: no adenopathy, no asymmetry, masses, or scars and thyroid normal to palpation  RESP: lungs clear to auscultation - no rales, rhonchi or wheezes  CV: regular rates and rhythm, normal S1 S2, no S3 or S4, no murmur, click or rub, no peripheral edema and peripheral pulses strong  ABDOMEN: soft, nontender, no hepatosplenomegaly, no masses and bowel sounds normal  MS: no musculoskeletal defects are noted and gait is age appropriate without ataxia  SKIN: no suspicious lesions or rashes, 3 bruises on fore arms without raised lesions. Could be capillary fragility. Age related skin changes   NEURO: Normal strength and tone, sensory exam grossly normal, mentation intact and speech normal  PSYCH: mentation appears normal and affect normal/bright     Diagnostic Test Results:  Labs reviewed in Epic  Results for orders placed or performed in visit on 09/19/19 (from the past 24 hour(s))   Erythrocyte sedimentation rate auto   Result Value Ref Range    Sed Rate 14 0 - 20 mm/h   CBC with platelets differential   Result Value Ref Range    WBC 5.2 4.0 - 11.0 10e9/L    RBC Count 4.20 (L) 4.4 - 5.9 10e12/L    Hemoglobin 13.9 13.3 - 17.7 g/dL    Hematocrit 41.7 40.0 - 53.0 %    MCV 99 78 - 100 fl    MCH 33.1 (H) 26.5 - 33.0 pg    MCHC 33.3 31.5 - 36.5 g/dL    RDW 12.4 10.0 - 15.0 %    Platelet Count 256 150 - 450 10e9/L    % Neutrophils 47.9 %    % Lymphocytes 35.2 %    % Monocytes 12.1 %    % Eosinophils 4.2 %    % Basophils 0.6 %    Absolute Neutrophil 2.5 1.6 - 8.3 10e9/L    Absolute Lymphocytes 1.8 0.8 - 5.3 10e9/L    Absolute Monocytes 0.6 0.0 - " "1.3 10e9/L    Absolute Eosinophils 0.2 0.0 - 0.7 10e9/L    Absolute Basophils 0.0 0.0 - 0.2 10e9/L    Diff Method Automated Method        ASSESSMENT / PLAN:       ICD-10-CM    1. Encounter for Medicare annual wellness exam Z00.00 Doing well and needs refills today. Still having recurrent bruises on arms. Leaving for [place] for the winter in 2 weeks.    2. Essential hypertension with goal blood pressure less than 140/90 I10 losartan (COZAAR) 50 MG tablet   3. Hyperlipidemia LDL goal <130 E78.5 simvastatin (ZOCOR) 20 MG tablet     Lipid panel reflex to direct LDL Fasting   4. Personal history of tobacco use- less than 10 pack year history  Z87.891 Prof Fee: Shared Decision Making Visit for Lung Cancer Screening   5. Easy bruising R23.8 Comprehensive metabolic panel     CRP inflammation     Erythrocyte sedimentation rate auto     CBC with platelets differential   6. Allergic purpura (H)  D69.0 INR     Partial thromboplastin time       End of Life Planning:  Patient currently has an advanced directive: Yes.  Practitioner is supportive of decision.    COUNSELING:  Reviewed preventive health counseling, as reflected in patient instructions       Regular exercise       Healthy diet/nutrition       Dental care       Bladder control       Colon cancer screening    Estimated body mass index is 25.07 kg/m  as calculated from the following:    Height as of this encounter: 1.664 m (5' 5.5\").    Weight as of this encounter: 69.4 kg (153 lb).         reports that he has quit smoking. His smoking use included cigarettes. He has a 5.00 pack-year smoking history. He has never used smokeless tobacco.      Appropriate preventive services were discussed with this patient, including applicable screening as appropriate for cardiovascular disease, diabetes, osteopenia/osteoporosis, and glaucoma.  As appropriate for age/gender, discussed screening for colorectal cancer, prostate cancer, breast cancer, and cervical cancer. Checklist " reviewing preventive services available has been given to the patient.    Reviewed patients plan of care and provided an AVS. The Basic Care Plan (routine screening as documented in Health Maintenance) for Alonso meets the Care Plan requirement. This Care Plan has been established and reviewed with the Patient.    Counseling Resources:  ATP IV Guidelines  Pooled Cohorts Equation Calculator  Breast Cancer Risk Calculator  FRAX Risk Assessment  ICSI Preventive Guidelines  Dietary Guidelines for Americans, 2010  USDA's MyPlate  ASA Prophylaxis  Lung CA Screening    Yvonne Granado MD  [facility]    Identified Health Risks:

## 2019-09-19 NOTE — PROGRESS NOTES
Lung Cancer Screening Shared Decision Making Visit     Alonso Churchill is not eligible for lung cancer screening on the basis of the information provided in my signed lung cancer screening order. Alonso's smoking history is below the threshold and so it is not recommended.    Patient is currently a smoker and so we did discuss that the only way to prevent lung cancer is to not smoke. Smoking cessation assistance was not offered.    ShouldIScreen

## 2019-09-20 DIAGNOSIS — D69.0 ALLERGIC PURPURA (H): ICD-10-CM

## 2019-09-20 DIAGNOSIS — R23.3 EASY BRUISING: ICD-10-CM

## 2019-09-20 LAB
APTT PPP: 34 SEC (ref 22–37)
INR PPP: 1.24 (ref 0.86–1.14)

## 2019-09-20 PROCEDURE — 85610 PROTHROMBIN TIME: CPT | Performed by: FAMILY MEDICINE

## 2019-09-20 PROCEDURE — 36415 COLL VENOUS BLD VENIPUNCTURE: CPT | Performed by: FAMILY MEDICINE

## 2019-09-20 PROCEDURE — 85730 THROMBOPLASTIN TIME PARTIAL: CPT | Performed by: FAMILY MEDICINE

## 2019-09-22 NOTE — RESULT ENCOUNTER NOTE
Dear Alonso    Your test results are attached.     The INR is slightly elevated and this may cause easier bleeding under the skin. I recommend a liver ultrasound, just to make sure there is nothing active going on, even though your other liver tests were normal. I will put in an order and if you can schedule this before you leave for [place], that would be great.     Please schedule your ultrasound by calling [phone].     Please contact me by Sciencet if you have any questions about your labs or management.    Yvonne Granado MD

## 2019-09-22 NOTE — RESULT ENCOUNTER NOTE
Dear Alonso    Your test results are attached. I am happy to let you know that they are stable.    The blood sugar is normal and you do not have diabetes. The kidney and liver tests were normal. The hemoglobin is normal and you do not have anemia. The tests for inflammation and your platelet count are in good range. The cholesterol looks great.    Please contact me by MyChart if you have any questions about your labs or management.    Yvonne Granado MD

## 2019-09-27 ENCOUNTER — ANCILLARY PROCEDURE (OUTPATIENT)
Dept: ULTRASOUND IMAGING | Facility: CLINIC | Age: 74
End: 2019-09-27
Attending: FAMILY MEDICINE
Payer: MEDICARE

## 2019-09-27 DIAGNOSIS — D69.0 ALLERGIC PURPURA (H): ICD-10-CM

## 2019-09-27 DIAGNOSIS — R79.1 ELEVATED INR: ICD-10-CM

## 2019-09-27 PROCEDURE — 76700 US EXAM ABDOM COMPLETE: CPT

## 2019-09-29 NOTE — RESULT ENCOUNTER NOTE
Dear Alonso    Your test results are attached. I am happy to let you know that they are stable.    Your liver ultrasound is normal and you do not need further work up. There is a simple cyst in the right kidney and this also does not need further evaluation or treatment. Everything is good at this time.     Please contact me by MyChart if you have any questions about your labs or management.    Yvonne Granado MD

## 2019-11-19 ENCOUNTER — TRANSFERRED RECORDS (OUTPATIENT)
Dept: HEALTH INFORMATION MANAGEMENT | Facility: CLINIC | Age: 74
End: 2019-11-19

## 2020-02-14 ENCOUNTER — TRANSFERRED RECORDS (OUTPATIENT)
Dept: HEALTH INFORMATION MANAGEMENT | Facility: CLINIC | Age: 75
End: 2020-02-14

## 2020-05-15 DIAGNOSIS — I10 ESSENTIAL HYPERTENSION WITH GOAL BLOOD PRESSURE LESS THAN 140/90: ICD-10-CM

## 2020-05-15 NOTE — LETTER
May 19, 2020      Alonso Churchill  [address]  [place] [address]        Dear ,    At [facility] we care about your health and are committed to providing quality patient care. Regular appointments are a vital part of the care and management of your health and can help prevent many of the complications that can occur.       It has come to our attention that you have been given a 30 day refill  of COZAAR and that you are due for a visit with your provider.  Please call [facility] at [phone] or use [facility] to schedule your appointment.       Sincerely,   [facility] Care Team

## 2020-05-19 RX ORDER — LOSARTAN POTASSIUM 50 MG/1
TABLET ORAL
Qty: 90 TABLET | Refills: 0 | Status: SHIPPED | OUTPATIENT
Start: 2020-05-19 | End: 2020-07-22

## 2020-05-19 NOTE — TELEPHONE ENCOUNTER
Prescription approved per [facility] Refill Protocol.    Pt due for office visit; routed to scheduling pool     Carley Birmingham, PharmD, [facility] Center[facility]   Medication Management Pharmacist   [facility]  [phone]

## 2020-05-19 NOTE — TELEPHONE ENCOUNTER
Reminder letter to schedule needed appt for further refills mailed to pt's home address.      Diamond HARRELL (R))

## 2020-05-26 ENCOUNTER — OFFICE VISIT (OUTPATIENT)
Dept: FAMILY MEDICINE | Facility: CLINIC | Age: 75
End: 2020-05-26
Payer: MEDICARE

## 2020-05-26 ENCOUNTER — VIRTUAL VISIT (OUTPATIENT)
Dept: FAMILY MEDICINE | Facility: CLINIC | Age: 75
End: 2020-05-26
Payer: MEDICARE

## 2020-05-26 ENCOUNTER — TELEPHONE (OUTPATIENT)
Dept: FAMILY MEDICINE | Facility: CLINIC | Age: 75
End: 2020-05-26

## 2020-05-26 VITALS
HEIGHT: 66 IN | BODY MASS INDEX: 24.43 KG/M2 | DIASTOLIC BLOOD PRESSURE: 74 MMHG | SYSTOLIC BLOOD PRESSURE: 127 MMHG | OXYGEN SATURATION: 97 % | TEMPERATURE: 98.5 F | RESPIRATION RATE: 16 BRPM | HEART RATE: 69 BPM | WEIGHT: 152 LBS
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
## 2020-05-26 DIAGNOSIS — B07.8 COMMON WART: ICD-10-CM

## 2020-05-26 PROCEDURE — 99000 SPECIMEN HANDLING OFFICE-LAB: CPT | Performed by: INTERNAL MEDICINE

## 2020-05-26 PROCEDURE — 36415 COLL VENOUS BLD VENIPUNCTURE: CPT | Performed by: INTERNAL MEDICINE

## 2020-05-26 PROCEDURE — 86618 LYME DISEASE ANTIBODY: CPT | Performed by: INTERNAL MEDICINE

## 2020-05-26 PROCEDURE — 99207 ZZC NO BILLABLE SERVICE THIS VISIT: CPT | Performed by: PREVENTIVE MEDICINE

## 2020-05-26 PROCEDURE — 99213 OFFICE O/P EST LOW 20 MIN: CPT | Performed by: INTERNAL MEDICINE

## 2020-05-26 PROCEDURE — 86666 EHRLICHIA ANTIBODY: CPT | Mod: 90 | Performed by: INTERNAL MEDICINE

## 2020-05-26 RX ORDER — DOXYCYCLINE 100 MG/1
100 CAPSULE ORAL 2 TIMES DAILY
Qty: 202 CAPSULE | Refills: 2 | Status: SHIPPED | OUTPATIENT
Start: 2020-05-26 | End: 2020-06-05

## 2020-05-26 RX ORDER — IMIQUIMOD 12.5 MG/.25G
CREAM TOPICAL
Qty: 12 PACKET | Refills: 5 | Status: SHIPPED | OUTPATIENT
Start: 2020-05-26 | End: 2020-08-26

## 2020-05-26 RX ORDER — MUPIROCIN CALCIUM 20 MG/G
CREAM TOPICAL 2 TIMES DAILY
Qty: 30 G | Refills: 2 | Status: SHIPPED | OUTPATIENT
Start: 2020-05-26 | End: 2020-08-26

## 2020-05-26 ASSESSMENT — PAIN SCALES - GENERAL
PAINLEVEL: NO PAIN (0)
PAINLEVEL: NO PAIN (0)

## 2020-05-26 ASSESSMENT — MIFFLIN-ST. JEOR: SCORE: 1354.28

## 2020-05-26 NOTE — Clinical Note
Please assist patient in scheduling a face to face visit at [place] today or tomorrow. Carolyn Mckeon MD MPH

## 2020-05-26 NOTE — PROGRESS NOTES
"Alonso Churchill is a 75 year old male who is being evaluated via a billable telephone visit.      The patient has been notified of following:     \"This telephone visit will be conducted via a call between you and your physician/provider. We have found that certain health care needs can be provided without the need for a physical exam.  This service lets us provide the care you need with a short phone conversation.  If a prescription is necessary we can send it directly to your pharmacy.  If lab work is needed we can place an order for that and you can then stop by our lab to have the test done at a later time.    Telephone visits are billed at different rates depending on your insurance coverage. During this emergency period, for some insurers they may be billed the same as an in-person visit.  Please reach out to your insurance provider with any questions.    If during the course of the call the physician/provider feels a telephone visit is not appropriate, you will not be charged for this service.\"    Patient has given verbal consent for Telephone visit?  Yes    What phone number would you like to be contacted at? [phone]    How would you like to obtain your AVS? MyChart    Subjective     Alonso Churchill is a 75 year old male who presents via phone visit today for the following health issues:    HPI  Concern - tick   Onset: Monday,     Description:   Swelling and looks infected. Located on left side of face by sideburns. Patient states he took it out believes the head is still in there     Intensity: moderate    Progression of Symptoms:  worsening    Accompanying Signs & Symptoms:  Swelling and red    Therapies Tried and outcome: neosporin on the spot     Removed on Saturday (3 days ago) with tweezers.  Swelled  Itching  Left side of the face on sideburns  Wood tick+  No hiking or camping  No fevers  No joint pains  No drainage  Lyme disease in the past  No rash noticed  Prefers in person visit "       Patient Active Problem List   Diagnosis     IBS (irritable bowel syndrome)     Hyperlipidemia LDL goal <130     Advance care planning     AK (actinic keratosis)     Family history of prostate cancer     Cervical stenosis of spinal canal     Essential hypertension with goal blood pressure less than 140/90     Wears hearing aid     Past Surgical History:   Procedure Laterality Date     COLONOSCOPY WITH CO2 INSUFFLATION N/A 9/20/2018    Procedure: COLONOSCOPY WITH CO2 INSUFFLATION;  Colonoscopy;  Surgeon: Duane, William Charles, MD;  Location: [facility] OR     HEMORRHOIDECTOMY BANDING  2017     ROTATOR CUFF REPAIR RT/LT  3/2012    right  - dr. benavidez     SURGICAL HISTORY OF -   disc surgery     SURGICAL HISTORY OF -   back surgery       Social History     Tobacco Use     Smoking status: Former Smoker     Packs/day: 0.50     Years: 10.00     Pack years: 5.00     Types: Cigarettes     Smokeless tobacco: Never Used     Tobacco comment: 35 years    Substance Use Topics     Alcohol use: Yes     Alcohol/week: 0.0 standard drinks     Comment: glass of wine per day or less     Family History   Problem Relation Age of Onset     Heart Disease Father      Cerebrovascular Disease Father      Prostate Cancer Brother         had surgery         Current Outpatient Medications   Medication Sig Dispense Refill     calcium carbonate (TUMS) 500 MG chewable tablet Take 1 tablet (500 mg) by mouth 2 times daily 100 tablet 3     cholecalciferol (VITAMIN D) 1000 UNIT tablet Take 1 tablet (1,000 Units) by mouth daily 100 tablet 3     Cyanocobalamin (B-12 PO) Take 400 mg by mouth       losartan (COZAAR) 50 MG tablet TAKE 1 TABLET BY MOUTH ONCE DAILY FOR BLOOD PRESSURE 90 tablet 0     Magnesium 400 MG TABS Take by mouth three times a week       MELOXICAM PO Take 7.5 mg by mouth daily        MULTI-VITAMIN PO 1 Tablet every day       Omega-3 Fatty Acids (OMEGA-3 FISH OIL) 1200 MG CAPS Take by mouth 2 times daily       Psyllium (METAMUCIL PO) Take  by mouth every morning       simvastatin (ZOCOR) 20 MG tablet Take 1 tablet (20 mg) by mouth At Bedtime 90 tablet 3     vitamin E 400 UNIT capsule Take by mouth three times a week       Zinc Sulfate (ZINC 15 PO) Take by mouth three times a week       Allergies   Allergen Reactions     Lisinopril      Cough       BP Readings from Last 3 Encounters:   09/19/19 138/71   09/20/18 126/75   09/18/18 136/82    Wt Readings from Last 3 Encounters:   09/19/19 69.4 kg (153 lb)   09/17/18 68 kg (150 lb)   09/18/18 67.4 kg (148 lb 9.6 oz)             Reviewed and updated as needed this visit by Provider         Review of Systems   Constitutional, HEENT, cardiovascular, pulmonary, gi and gu systems are negative, except as otherwise noted.       Objective   Reported vitals:  There were no vitals taken for this visit.   healthy and no distress  PSYCH: Alert and oriented times 3; coherent speech, normal   rate and volume, able to articulate logical thoughts, able   to abstract reason, no tangential thoughts, no hallucinations   or delusions  His affect is normal  RESP: No cough, no audible wheezing, able to talk in full sentences  Remainder of exam unable to be completed due to telephone visits    Diagnostic Test Results:  Labs reviewed in Epic  No results found for this or any previous visit (from the past 24 hour(s)).        Assessment/Plan:  1. Tick bite, initial encounter  -patient is requesting in person appointment, OK to schedule        Return in about 1 day (around 5/27/2020) for Routine Visit.      Phone call duration:  5 minutes    Carolyn Mckeon MD MPH

## 2020-05-26 NOTE — TELEPHONE ENCOUNTER
Noted. Phone visit done. OK to be seen at [place]. Appointment this afternoon with Dr. Rome.  Will close encounter.  Carolyn Mckeon MD MPH

## 2020-05-26 NOTE — PROGRESS NOTES
Subjective     Alonso Churchill is a 75 year old male who presents to clinic today for the following health issues:    HPI   Tick bite      Duration: noticed Saturday    Description (location/character/radiation): tick bite left side by ear.    Intensity:  moderate    Accompanying signs and symptoms: swelling, redness and itching    History (similar episodes/previous evaluation): None    Precipitating or alleviating factors: None    Therapies tried and outcome: neosporin         Patient Active Problem List   Diagnosis     IBS (irritable bowel syndrome)     Hyperlipidemia LDL goal <130     Advance care planning     AK (actinic keratosis)     Family history of prostate cancer     Cervical stenosis of spinal canal     Essential hypertension with goal blood pressure less than 140/90     Wears hearing aid     Past Surgical History:   Procedure Laterality Date     COLONOSCOPY WITH CO2 INSUFFLATION N/A 9/20/2018    Procedure: COLONOSCOPY WITH CO2 INSUFFLATION;  Colonoscopy;  Surgeon: Duane, William Charles, MD;  Location: [facility] OR     HEMORRHOIDECTOMY BANDING  2017     ROTATOR CUFF REPAIR RT/LT  3/2012    right  - dr. benavidez     SURGICAL HISTORY OF -   disc surgery     SURGICAL HISTORY OF -   back surgery       Social History     Tobacco Use     Smoking status: Former Smoker     Packs/day: 0.50     Years: 10.00     Pack years: 5.00     Types: Cigarettes     Smokeless tobacco: Never Used     Tobacco comment: 35 years    Substance Use Topics     Alcohol use: Yes     Alcohol/week: 0.0 standard drinks     Comment: glass of wine per day or less     Family History   Problem Relation Age of Onset     Heart Disease Father      Cerebrovascular Disease Father      Prostate Cancer Brother         had surgery         Allergies   Allergen Reactions     Lisinopril      Cough       Recent Labs   Lab Test 09/19/19  0744 09/10/18  0705 09/13/17  0655 09/02/16  0650  09/12/14  0817 09/11/13  0825   LDL 82 91 91 91   < > 91 93   HDL 83 73 84 75  "  < > 84 65   TRIG 42 77 48 49   < > 64 58   ALT 22  --   --  22  --  30 22   CR 0.88 0.90 0.90 0.88   < > 0.92 0.85   GFRESTIMATED 84 82 82 86   < > 81 89   GFRESTBLACK >90 >90 >90 >90  African American GFR Calc     < > >90   GFR Calc   >90   POTASSIUM 4.8 4.4 4.1 4.3   < > 5.4* 4.7   TSH  --   --   --   --   --   --  1.87    < > = values in this interval not displayed.      BP Readings from Last 3 Encounters:   05/26/20 127/74   09/19/19 138/71   09/20/18 126/75    Wt Readings from Last 3 Encounters:   05/26/20 68.9 kg (152 lb)   09/19/19 69.4 kg (153 lb)   09/17/18 68 kg (150 lb)                 Reviewed and updated as needed this visit by Provider         Review of Systems   CONSTITUTIONAL: NEGATIVE for fever, chills, change in weight  INTEGUMENTARY/SKIN: NEGATIVE for worrisome rashes, moles or lesions  EYES: NEGATIVE for vision changes or irritation  ENT/MOUTH: NEGATIVE for ear, mouth and throat problems  RESP: NEGATIVE for significant cough or SOB  CV: NEGATIVE for chest pain, palpitations or peripheral edema  GI: NEGATIVE for nausea, abdominal pain, heartburn, or change in bowel habits  : NEGATIVE for frequency, dysuria, or hematuria  MUSCULOSKELETAL: NEGATIVE for significant arthralgias or myalgia  NEURO: NEGATIVE for weakness, dizziness or paresthesias  ENDOCRINE: NEGATIVE for temperature intolerance, skin/hair changes  HEME: NEGATIVE for bleeding problems  PSYCHIATRIC: NEGATIVE for changes in mood or affect      Objective    BP (!) 150/66 (BP Location: Right arm, Patient Position: Sitting, Cuff Size: Adult Regular)   Pulse 69   Temp 98.5  F (36.9  C) (Oral)   Resp 16   Ht 1.664 m (5' 5.5\")   Wt 68.9 kg (152 lb)   SpO2 97%   BMI 24.91 kg/m    Body mass index is 24.91 kg/m .  Physical Exam   GENERAL: healthy, alert and no distress  EYES: Eyes grossly normal to inspection, PERRL and conjunctivae and sclerae normal  HENT: ear canals and TM's normal, nose and mouth without ulcers or " lesions  NECK: no adenopathy, no asymmetry, masses, or scars and thyroid normal to palpation  RESP: lungs clear to auscultation - no rales, rhonchi or wheezes  CV: regular rate and rhythm, normal S1 S2, no S3 or S4, no murmur, click or rub, no peripheral edema and peripheral pulses strong  ABDOMEN: soft, nontender, no hepatosplenomegaly, no masses and bowel sounds normal  MS: no gross musculoskeletal defects noted, no edema  SKIN: no suspicious lesions or rashes  NEURO: Normal strength and tone, mentation intact and speech normal  PSYCH: mentation appears normal, affect normal/bright    Diagnostic Test Results:  Labs reviewed in Epic        Assessment & Plan     1. Tick bite, initial encounter    - doxycycline hyclate (VIBRAMYCIN) 100 MG capsule; Take 1 capsule (100 mg) by mouth 2 times daily for 10 days  Dispense: 202 capsule; Refill: 2  - **Lyme Disease Karli with reflex to WB Serum FUTURE 14d; Future  - Anaplasma phagocytoph antibody IgG IgM  - mupirocin (BACTROBAN) 2 % external cream; Apply topically 2 times daily  Dispense: 30 g; Refill: 2  - **Lyme Disease Karli with reflex to WB Serum FUTURE 14d    2. Common wart    - imiquimod (ALDARA) 5 % external cream; Apply a small sized amount to common wart on left index finger three times weekly at bedtime.   Wash off after 8 hours.   May use for up to 16 weeks.  Dispense: 12 packet; Refill: 5       Return in about 1 week (around 6/2/2020).    Dawson Rome MD  [facility]

## 2020-05-26 NOTE — TELEPHONE ENCOUNTER
.Reason for Call:  Other appointment    Detailed comments: Patient has a telephone apt with  for tick on his face and patient is wondering if he can schedule a face to face visit instead. Please call back in regards to this message.     Phone Number Patient can be reached at: Cell number on file:    Telephone Information:   Mobile [phone]       Best Time: any    Can we leave a detailed message on this number? YES    Call taken on 5/26/2020 at 8:37 AM by Eugenio Woo

## 2020-05-26 NOTE — PROGRESS NOTES
Called and spoke to the patient and scheduled a face to face office visit for today, 5/26/2020 at [place]. I gave the patient the address, patient understands.  Leatha Rubio [facility]  2nd Floor  Primary Care

## 2020-05-26 NOTE — PATIENT INSTRUCTIONS
At [facility], we strive to deliver an exceptional experience to you, every time we see you. If you receive a survey, please complete it as we do value your feedback.  If you have MyChart, you can expect to receive results automatically within 24 hours of their completion.  Your provider will send a note interpreting your results as well.   If you do not have MyChart, you should receive your results in about a week by mail.    Your care team:                            Family Medicine Internal Medicine   MD Dawson Steele MD Shantel Branch-Fleming, MD Katya Georgiev PA-C Megan Hill, APRKRISTIAN Hicks, MD Pediatrics   Jonah Chavarria, PAJAMI Rodrigues, MD Nikki Baca APRN CNP   MD Jimena Dodd MD Deborah Mielke, MD Kim Thein, APRN [facility]      Clinic hours: Monday - Thursday 7 am-7 pm; Fridays 7 am-5 pm.   Urgent care: Monday - Friday 11 am-9 pm; Saturday and Sunday 9 am-5 pm.    Clinic: [phone]       [facility]: Monday - Thursday 8 am - 7 pm; Friday 8 am - 6 pm  [facility]: [phone]     Use [email or web address] for 24/7 diagnosis and treatment of dozens of conditions.

## 2020-05-26 NOTE — TELEPHONE ENCOUNTER
Patient was bit by a tick and thinks the head of tick is in the skin where the bite is on his face. Patient has hx of lymes disease. Patient thinks that the bite is infected. Red and inflamed. Swelling. Itchy .    Negative for following symptoms: fever, body aches chills.     Patient has phone apt set up for Tuesday May 26 at 1100 with Dr. Mckeon, but would like to know if he could have in person apt instead. Told patient may not hear back from us prior to phone apt if ok for face to face visit so told him  if does not hear back from us prior to phone apt provider can determine plan during phone apt.     Christina Pradhan RN

## 2020-05-27 LAB — B BURGDOR IGG+IGM SER QL: 0.18 (ref 0–0.89)

## 2020-06-02 LAB
A PHAGOCYTOPH IGG TITR SER IF: NORMAL {TITER}
A PHAGOCYTOPH IGM TITR SER IF: NORMAL {TITER}

## 2020-06-23 ENCOUNTER — DOCUMENTATION ONLY (OUTPATIENT)
Dept: OTHER | Facility: CLINIC | Age: 75
End: 2020-06-23

## 2020-07-18 DIAGNOSIS — I10 ESSENTIAL HYPERTENSION WITH GOAL BLOOD PRESSURE LESS THAN 140/90: ICD-10-CM

## 2020-07-21 NOTE — TELEPHONE ENCOUNTER
Routing refill request to provider for review/approval because:  Allergy    Edda Gale, Alana Garcia RN

## 2020-07-22 RX ORDER — LOSARTAN POTASSIUM 50 MG/1
TABLET ORAL
Qty: 90 TABLET | Refills: 2 | Status: SHIPPED | OUTPATIENT
Start: 2020-07-22 | End: 2020-09-22

## 2020-08-17 ENCOUNTER — TELEPHONE (OUTPATIENT)
Dept: FAMILY MEDICINE | Facility: CLINIC | Age: 75
End: 2020-08-17

## 2020-08-17 NOTE — TELEPHONE ENCOUNTER
Patient contacted and informed of the below per provider documentation. Patient verbalizes understanding.     Marybeth Bowers RN  [facility]

## 2020-08-17 NOTE — TELEPHONE ENCOUNTER
A [place] provider would need to be licensed in [place] to do a virtual visit there or he would need to drive to [place].  I am not sure if any of our providers have [place] licensure. He cna check to see if any providers are covered in his current location or go to the ED if necessary.      Jonah Chavarria PA-C

## 2020-08-17 NOTE — TELEPHONE ENCOUNTER
Patient contacted he is in [place], [place]. He has a stiff and sore neck with pain all the way down his neck, through his arm, down to his thumb. He can use the arm and have range of motion but thinks he may have herniated a disk. He has been taking to a massage therapist and chiropractor where he is at. He cannot get in to see anyone in town until next week. He was hoping he could get started on a prednisone or something stronger, OTC. He has a [facility] in [place] he can use. He would like to get started on something asap.     When attempting to schedule telephone visit it asks if patient is in [place] and he is not. I am not sure if we can proceed with telephone appointment or what the rules are, so please advise.     Routing to provider to review and advise.     Marybeth Bowers RN  [facility]

## 2020-08-17 NOTE — TELEPHONE ENCOUNTER
Reason for call:  Patient reporting a symptom    Symptom or request: Right arm pain possible strain    Duration (how long have symptoms been present):  2 weeks    Have you been treated for this before? No    Additional comments: Call patient @ [phone].      Phone Number patient can be reached at:  Cell number on file:    Telephone Information:   Mobile [phone]       Best Time:  anytime    Can we leave a detailed message on this number:  YES    Call taken on 8/17/2020 at 10:51 AM by Melody Shin

## 2020-08-26 ENCOUNTER — OFFICE VISIT (OUTPATIENT)
Dept: FAMILY MEDICINE | Facility: CLINIC | Age: 75
End: 2020-08-26
Payer: MEDICARE

## 2020-08-26 VITALS
TEMPERATURE: 98.2 F | OXYGEN SATURATION: 100 % | WEIGHT: 149 LBS | SYSTOLIC BLOOD PRESSURE: 142 MMHG | BODY MASS INDEX: 23.95 KG/M2 | DIASTOLIC BLOOD PRESSURE: 84 MMHG | HEIGHT: 66 IN | HEART RATE: 69 BPM

## 2020-08-26 DIAGNOSIS — M79.2 RADICULAR PAIN IN RIGHT ARM: Primary | ICD-10-CM

## 2020-08-26 DIAGNOSIS — M54.12 C6 RADICULOPATHY: ICD-10-CM

## 2020-08-26 PROCEDURE — 99214 OFFICE O/P EST MOD 30 MIN: CPT | Performed by: FAMILY MEDICINE

## 2020-08-26 RX ORDER — PREDNISONE 20 MG/1
40 TABLET ORAL EVERY MORNING
Qty: 10 TABLET | Refills: 0 | Status: SHIPPED | OUTPATIENT
Start: 2020-08-26 | End: 2020-08-31

## 2020-08-26 RX ORDER — GABAPENTIN 300 MG/1
300 CAPSULE ORAL AT BEDTIME
Qty: 30 CAPSULE | Refills: 0 | Status: SHIPPED | OUTPATIENT
Start: 2020-08-26 | End: 2020-09-18

## 2020-08-26 ASSESSMENT — MIFFLIN-ST. JEOR: SCORE: 1340.67

## 2020-08-26 NOTE — PATIENT INSTRUCTIONS
Begin Prednisone for antiinflammatory effect for 5 days.  Add Gabapentin at night for nerve pain symptoms.  MRI recommended.   You can call [phone] to schedule this at the [facility] building in [place]      Release of information for records from [place].          At [facility], we strive to deliver an exceptional experience to you, every time we see you. If you receive a survey, please complete it as we do value your feedback.  If you have MyChart, you can expect to receive results automatically within 24 hours of their completion.  Your provider will send a note interpreting your results as well.   If you do not have MyChart, you should receive your results in about a week by mail.    Your care team:     Family Medicine   ABHIJEET Chandler APRN CNP S. Matthew Hockett, MD Pamela Kolacz, MD Angela Wermerskirchen, MD    Internal Medicine  Oumar Luke MD     Clinic hours: Monday - Wednesday 7 am-7 pm   Thursdays and Fridays 7 am-5 pm.     [facility]: Monday - Friday 11 am-9 pm,   Saturday and Sunday 9 am-5 pm.     [place] [facility]: Monday - Thursday 8 am - 7 pm; Friday 8 am - 6 pm    Clinic: [phone]   [facility]: [phone]     Use [email or web address] for 24/7 diagnosis and treatment of dozens of conditions.

## 2020-08-26 NOTE — PROGRESS NOTES
"Subjective     Alonso Churchill is a 75 year old male who presents to clinic today for the following health issues:    HPI       Neck Pain  Onset/Duration: 4 weeks was doing more lifting and staining wood prior to symptoms.  Description:   Location: Right shoulder and into neck and to right arm to thumb   Radiation: into the right forearm/thumb and  Intensity: severe  Progression of Symptoms:  worsening  Accompanying Signs & Symptoms:  Burning, tingling, prickly sensation in arm(s): YES - tingling in thumb  Numbness in arm(s): no   Weakness in arm(s):  no  Fever: no  Headache: YES  Nausea and/or vomiting: no  History:   Trauma: no  Previous neck pain: YES- somewhat - tenderness  Previous surgery or injections: no  Previous Imaging (MRI,X ray): YES - in [place], 2 years ago, no disc issues but arthritic changes.   -Sees neurologist there who has been following his MRI over the last few years.  Precipitating or alleviating factors: None  Does movement impact the pain:  YES  Therapies tried and outcome: ice, tylenol and advil without any improvement.          Review of Systems   Constitutional, HEENT, cardiovascular, pulmonary, gi and gu systems are negative, except as otherwise noted.      Objective    BP (!) 142/84   Pulse 69   Temp 98.2  F (36.8  C) (Oral)   Ht 1.664 m (5' 5.5\")   Wt 67.6 kg (149 lb)   SpO2 100%   BMI 24.42 kg/m    Body mass index is 24.42 kg/m .  Physical Exam   GENERAL: healthy, alert and no distress  NECK: no adenopathy, no asymmetry, masses, or scars and thyroid normal to palpation  RESP: lungs clear to auscultation - no rales, rhonchi or wheezes  CV: regular rate and rhythm, normal S1 S2, no S3 or S4, no murmur, click or rub, no peripheral edema and peripheral pulses strong  MS: Right upper extremity is noted to have some tenderness over the supraspinatus muscle.  He has full range of motion at the neck, shoulder, elbow, and wrist.  Strength equal bilaterally.      NEURO: Normal strength and " tone, sensory exam grossly normal, mentation intact, speech normal and DTR's abnormal: Mild decrease in reflex on the right antecubital  LYMPH: no cervical, supraclavicular, axillary, or inguinal adenopathy            Assessment & Plan     Radicular pain in right arm  Gradually worsening symptoms of weakness and pain with a history of spinal stenosis and degenerative disc disease per patient report.  He has tried over-the-counter anti-inflammatory and other pain medication without relief of symptoms.  Trial of a burst of prednisone is recommended.  Potential side effects and adverse reactions discussed.  He is agreeable to trying this and will monitor symptoms.  In addition due to the discomfort that is hindering his sleep we are going to try gabapentin at 300 mg at bedtime.    Last MRI was reportedly 2 years ago and this is being ordered for him today.  Release of information is signed and will be sent to his physician in [place] to obtain results for comparison.  They report that his previous issue was see 3 4.  His current symptoms are more consistent with C6-7.  Return for follow-up with 3 weeks or sooner if worsening symptoms.  - predniSONE (DELTASONE) 20 MG tablet; Take 2 tablets (40 mg) by mouth every morning for 5 days With food  - gabapentin (NEURONTIN) 300 MG capsule; Take 1 capsule (300 mg) by mouth At Bedtime  - MR Cervical Spine w/o Contrast; Future    C6 radiculopathy  See above  - predniSONE (DELTASONE) 20 MG tablet; Take 2 tablets (40 mg) by mouth every morning for 5 days With food  - MR Cervical Spine w/o Contrast; Future       Patient Instructions     Begin Prednisone for antiinflammatory effect for 5 days.  Add Gabapentin at night for nerve pain symptoms.  MRI recommended.   You can call [phone] to schedule this at the [facility] building in [place]      Release of information for records from [place].                    Return in about 3 weeks (around 9/16/2020) for as needed for persistent  symptoms.    Parvin Peterson MD  [facility]

## 2020-08-31 ENCOUNTER — MYC MEDICAL ADVICE (OUTPATIENT)
Dept: FAMILY MEDICINE | Facility: CLINIC | Age: 75
End: 2020-08-31

## 2020-08-31 ENCOUNTER — HOSPITAL ENCOUNTER (OUTPATIENT)
Dept: MRI IMAGING | Facility: CLINIC | Age: 75
Discharge: HOME | End: 2020-08-31
Attending: FAMILY MEDICINE | Admitting: FAMILY MEDICINE
Payer: MEDICARE

## 2020-08-31 DIAGNOSIS — E78.5 HYPERLIPIDEMIA LDL GOAL <130: ICD-10-CM

## 2020-08-31 DIAGNOSIS — I10 ESSENTIAL HYPERTENSION WITH GOAL BLOOD PRESSURE LESS THAN 140/90: Primary | ICD-10-CM

## 2020-08-31 DIAGNOSIS — R23.3 EASY BRUISING: ICD-10-CM

## 2020-08-31 DIAGNOSIS — M79.2 RADICULAR PAIN IN RIGHT ARM: ICD-10-CM

## 2020-08-31 DIAGNOSIS — M54.12 C6 RADICULOPATHY: ICD-10-CM

## 2020-08-31 DIAGNOSIS — Z12.5 SCREENING FOR PROSTATE CANCER: ICD-10-CM

## 2020-08-31 DIAGNOSIS — R79.1 ELEVATED INR: ICD-10-CM

## 2020-08-31 PROCEDURE — 72141 MRI NECK SPINE W/O DYE: CPT

## 2020-09-08 ENCOUNTER — OFFICE VISIT (OUTPATIENT)
Dept: NEUROSURGERY | Facility: CLINIC | Age: 75
End: 2020-09-08
Attending: PHYSICIAN ASSISTANT
Payer: MEDICARE

## 2020-09-08 VITALS
TEMPERATURE: 97.5 F | DIASTOLIC BLOOD PRESSURE: 77 MMHG | WEIGHT: 146.2 LBS | HEIGHT: 67 IN | BODY MASS INDEX: 22.95 KG/M2 | SYSTOLIC BLOOD PRESSURE: 168 MMHG | HEART RATE: 53 BPM

## 2020-09-08 DIAGNOSIS — M48.02 CERVICAL SPINAL STENOSIS: ICD-10-CM

## 2020-09-08 DIAGNOSIS — M54.12 C6 RADICULOPATHY: ICD-10-CM

## 2020-09-08 PROCEDURE — G0463 HOSPITAL OUTPT CLINIC VISIT: HCPCS

## 2020-09-08 PROCEDURE — 99203 OFFICE O/P NEW LOW 30 MIN: CPT | Performed by: PHYSICIAN ASSISTANT

## 2020-09-08 ASSESSMENT — MIFFLIN-ST. JEOR: SCORE: 1351.79

## 2020-09-08 ASSESSMENT — PAIN SCALES - GENERAL: PAINLEVEL: EXTREME PAIN (9)

## 2020-09-08 NOTE — PROGRESS NOTES
Neurosurgery Consult    HPI    Mr. Churchill is a 75-year-old male furred to us for evaluation of 6 weeks of right arm pain in a C6 distribution as well as numbness.  He reports pain primarily in his bicep and numbness in his first and second digits on the right.  He denies any left-sided symptoms.  He is right-handed.  He denies any loss of dexterity in his fingers, loss of coordination of his feet, denies Lhermitte's sign.  He has not tried any conservative therapies at this time.    Medical history  Hyperlipidemia  Hypertension    Social history  Retired furniture salesman      B/P: 168/77, T: 97.5, P: 53, R: Data Unavailable       Exam    Alert and oriented no acute distress  Bilateral upper extremities with 5/5 strength, with the exception of 5-/5 strength in the right biceps  Ani sign negative  Reflexes 2+ bilateral triceps, absent right biceps, 2+ left biceps, 2+ bilateral brachioradialis    Bilateral lower extremities with 5/5 strength  Reflexes 2+ patella/ankle  2-3 beats of nonsustained ankle clonus bilaterally  negative Babinski bilaterally  Gait is normal    Imaging    Cervical MRI demonstrates severe bilateral foraminal stenosis at C5-6 as well as central stenosis at C5-6 without myelomalacia.    Assessment    Cervical stenosis at C5-6  Foraminal stenosis at C5-6  Right arm radiculopathy    Plan:      We recommend the patient try a course of cervical traction and have a home unit issued if appropriate.  He will follow-up with Dr. Renee in clinic in [place] in 4 weeks depending on how he is responding to cervical traction.  If his symptoms are worsened significantly sooner than he should contact us sooner than that.  We discussed the difference between foraminal stenosis and central stenosis, and the warning signs and symptoms of myelopathy.  We briefly discussed the options relating to surgical intervention.  We will see how the patient does with cervical traction follow-up as planned.    Total  time of 30 minutes spent with the patient today greater than 50% spent face to face in counseling and coordination of care.

## 2020-09-08 NOTE — LETTER
9/8/2020         RE: Alonso Churchill  [address]  [place] [address]        Dear Colleague,    Thank you for referring your patient, Alonso Churchill, to the [facility]. Please see a copy of my visit note below.    Neurosurgery Consult    HPI    Mr. Churchill is a 75-year-old male furred to us for evaluation of 6 weeks of right arm pain in a C6 distribution as well as numbness.  He reports pain primarily in his bicep and numbness in his first and second digits on the right.  He denies any left-sided symptoms.  He is right-handed.  He denies any loss of dexterity in his fingers, loss of coordination of his feet, denies Lhermitte's sign.  He has not tried any conservative therapies at this time.    Medical history  Hyperlipidemia  Hypertension    Social history  Retired furniture salesman      B/P: 168/77, T: 97.5, P: 53, R: Data Unavailable       Exam    Alert and oriented no acute distress  Bilateral upper extremities with 5/5 strength, with the exception of 5-/5 strength in the right biceps  Ani sign negative  Reflexes 2+ bilateral triceps, absent right biceps, 2+ left biceps, 2+ bilateral brachioradialis    Bilateral lower extremities with 5/5 strength  Reflexes 2+ patella/ankle  2-3 beats of nonsustained ankle clonus bilaterally  negative Babinski bilaterally  Gait is normal    Imaging    Cervical MRI demonstrates severe bilateral foraminal stenosis at C5-6 as well as central stenosis at C5-6 without myelomalacia.    Assessment    Cervical stenosis at C5-6  Foraminal stenosis at C5-6  Right arm radiculopathy    Plan:      We recommend the patient try a course of cervical traction and have a home unit issued if appropriate.  He will follow-up with Dr. Renee in clinic in [place] in 4 weeks depending on how he is responding to cervical traction.  If his symptoms are worsened significantly sooner than he should contact us sooner than that.  We discussed the  difference between foraminal stenosis and central stenosis, and the warning signs and symptoms of myelopathy.  We briefly discussed the options relating to surgical intervention.  We will see how the patient does with cervical traction follow-up as planned.    Total time of 30 minutes spent with the patient today greater than 50% spent face to face in counseling and coordination of care.    Again, thank you for allowing me to participate in the care of your patient.        Sincerely,        Denis Olson PA-C

## 2020-09-08 NOTE — NURSING NOTE
"Alonso Churchill is a 75 year old male who presents for:  Chief Complaint   Patient presents with     Neurologic Problem     cervical Stenosis and radiculopathy        Initial Vitals:  BP (!) 168/77 (BP Location: Left arm, Patient Position: Sitting, Cuff Size: Adult Regular)   Pulse 53   Temp 97.5  F (36.4  C) (Oral)   Ht 5' 7\" (1.702 m)   Wt 146 lb 3.2 oz (66.3 kg)   BMI 22.90 kg/m   Estimated body mass index is 22.9 kg/m  as calculated from the following:    Height as of this encounter: 5' 7\" (1.702 m).    Weight as of this encounter: 146 lb 3.2 oz (66.3 kg).. Body surface area is 1.77 meters squared. BP completed using cuff size: regular  Extreme Pain (9)    Nursing Comments: radiates right shoulder arm and thumb. Also getting tingling    Manpreet Gray CMA    "

## 2020-09-08 NOTE — PATIENT INSTRUCTIONS
Matt to follow up with Primary Care provider regarding elevated blood pressure.    Follow up with Dr. Renee in 4 weeks in [place]

## 2020-09-10 ENCOUNTER — THERAPY VISIT (OUTPATIENT)
Dept: PHYSICAL THERAPY | Facility: CLINIC | Age: 75
End: 2020-09-10
Payer: MEDICARE

## 2020-09-10 DIAGNOSIS — M48.02 CERVICAL SPINAL STENOSIS: ICD-10-CM

## 2020-09-10 DIAGNOSIS — M54.12 CERVICAL RADICULOPATHY: ICD-10-CM

## 2020-09-10 DIAGNOSIS — M54.12 C6 RADICULOPATHY: ICD-10-CM

## 2020-09-10 DIAGNOSIS — M48.02 CERVICAL STENOSIS OF SPINAL CANAL: Primary | ICD-10-CM

## 2020-09-10 PROCEDURE — 97012 MECHANICAL TRACTION THERAPY: CPT | Mod: GP | Performed by: PHYSICAL THERAPIST

## 2020-09-10 PROCEDURE — 97140 MANUAL THERAPY 1/> REGIONS: CPT | Mod: GP | Performed by: PHYSICAL THERAPIST

## 2020-09-10 PROCEDURE — 97161 PT EVAL LOW COMPLEX 20 MIN: CPT | Mod: GP | Performed by: PHYSICAL THERAPIST

## 2020-09-10 NOTE — PROGRESS NOTES
[facility] Initial Evaluation  Subjective:    Patient Health History             Pertinent medical history includes: high blood pressure and numbness/tingling.   Red flags:  Pain at rest/night.         Current medications:  High blood pressure medication and pain medication.    Occupation: Retired.   Primary job tasks include:  Computer work, driving, lifting/carrying, pushing/pulling and repetitive tasks.                                    Objective:  System    Physical Exam    General     ROS    Assessment/Plan:

## 2020-09-10 NOTE — PROGRESS NOTES
[facility] Initial Evaluation  Subjective:  The history is provided by the patient.   Patient Health History  Alonso Churchill being seen for R C6 radiculopathy.     Problem began: 9/8/2020 (date of orders).   Problem occurred: DJD; shoulder pain and tingling into thumb.   Pain is reported as 8/10 on pain scale.  General health as reported by patient is good.  Pertinent medical history includes: high blood pressure and numbness/tingling.   Red flags:  Pain at rest/night.         Current medications:  High blood pressure medication and pain medication.    Current occupation is Retired.   Primary job tasks include:  Prolonged sitting, pushing/pulling, driving and repetitive tasks.   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                Therapist Generated HPI Evaluation  Problem details: R hand dominant.         Type of problem:  Cervical spine.    This is a chronic condition.  Condition occurred with:  Degenerative joint disease.  Where condition occurred: other.  Patient reports pain:  Cervical right side.  Pain is described as aching and is constant.  Pain radiates to:  Shoulder right, upper arm right and hand right. Pain is worse during the day.  Since onset symptoms are unchanged.  Associated symptoms:  Numbness and tingling (R thumb). Symptoms are exacerbated by lifting and sitting  and relieved by nothing and rest.  Special tests included:  MRI.    Work activity restrictions: self restricted activity.                          Objective:  Standing Alignment:    Cervical/Thoracic:  Forward head and cervical lordosis increased  Shoulder/UE:  Rounded shoulders                                  Cervical/Thoracic Evaluation    AROM:  AROM Cervical:    Flexion:            Mod - + neck pain  Extension:       Major - +++ pain  Rotation:         Left: Mod ++ pain     Right: Mod +++ pain  Side Bend:      Left: major +++ pain     Right:  Mod ++ pain        Cervical Myotomes:         C4 (shrug):  Left: 5    Right: 5  C5 (Deltoid):  Left: 5    Right: 5  C6 (Biceps):  Left: 5    Right: 5-  C7 (Triceps):  Left: 5    Right: 5-  C8 (Thumb Ext): Left: 5    Right: 4  T1 (Intrinsics): Left: 5    Right: 5-    Neural Tension:      Right side:  Uriah Cervical positive.    Cervical Palpation:    Tenderness present at Left:    Erector Spinae and Suboccipitals  Tenderness present at Right:    Upper Trap; Levator; Erector Spinae and Suboccipitals                                                  General     ROS    Assessment/Plan:    Patient is a 75 year old male with cervical complaints.    Patient has the following significant findings with corresponding treatment plan.                Diagnosis 1:  C6 radiculopathy  Pain -  hot/cold therapy, mechanical traction, manual therapy, self management, education, directional preference exercise and home program  Decreased ROM/flexibility - manual therapy, therapeutic exercise and home program  Decreased joint mobility - manual therapy, therapeutic exercise and home program  Decreased strength - therapeutic exercise, therapeutic activities and home program  Decreased proprioception - neuro re-education, therapeutic activities and home program  Impaired posture - neuro re-education, therapeutic activities and home program    Therapy Evaluation Codes:   Cumulative Therapy Evaluation is: Low complexity.    Previous and current functional limitations:  (See Goal Flow Sheet for this information)    Short term and Long term goals: (See Goal Flow Sheet for this information)     Communication ability:  Patient appears to be able to clearly communicate and understand verbal and written communication and follow directions correctly.  Treatment Explanation - The following has been discussed with the patient:   RX ordered/plan of care  Anticipated outcomes  Possible risks and side effects  This patient would benefit from PT intervention to resume normal activities.   Rehab  potential is good.    Frequency:  2 X week, once daily  Duration:  for3 weeks tapering to 1 X a week over 6 weeks  Discharge Plan:  Achieve all LTG.  Independent in home treatment program.  Reach maximal therapeutic benefit.  Patient may benefit from home traction unit.    Please refer to the daily flowsheet for treatment today, total treatment time and time spent performing 1:1 timed codes.

## 2020-09-10 NOTE — LETTER
DEPARTMENT OF HEALTH AND HUMAN SERVICES  CENTERS FOR MEDICARE & MEDICAID SERVICES    PLAN/UPDATED PLAN OF PROGRESS FOR OUTPATIENT REHABILITATION@       PATIENTS NAME:  Alonso Churchill   : 1945    PROVIDER NUMBER:    [id number]    [id number]     PROVIDER NAME: [facility]    MEDICAL RECORD NUMBER: [id number]     START OF CARE DATE:  SOC Date: 09/10/20   TYPE:  PT    PRIMARY/TREATMENT DIAGNOSIS: (Pertinent Medical Diagnosis)     Cervical spinal stenosis  C6 radiculopathy  Cervical stenosis of spinal canal  Cervical radiculopathy    VISITS FROM START OF CARE:  Rxs Used: 1     [employer] [facility] Initial Evaluation  Subjective:  The history is provided by the patient.   Patient Health History  Alonso Churchill being seen for R C6 radiculopathy.   Problem began: 2020 (date of orders).   Problem occurred: DJD; shoulder pain and tingling into thumb.   Pain is reported as 8/10 on pain scale.  General health as reported by patient is good.  Pertinent medical history includes: high blood pressure and numbness/tingling.   Red flags:  Pain at rest/night.   Current medications:  High blood pressure medication and pain medication.    Current occupation is Retired.   Primary job tasks include:  Prolonged sitting, pushing/pulling, driving and repetitive tasks.   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                Therapist Generated HPI Evaluation  Problem details: R hand dominant.         Type of problem:  Cervical spine.  This is a chronic condition.  Condition occurred with:  Degenerative joint disease.  Where condition occurred: other.    Patient reports pain:  Cervical right side.  Pain is described as aching and is constant.    PATIENTS NAME:  Alonso Churchill   : 1945    Pain radiates to:  Shoulder right, upper arm right and hand right. Pain is worse during the day.  Since onset symptoms are unchanged.  Associated  symptoms:  Numbness and tingling (R thumb). Symptoms are exacerbated by lifting and sitting  and relieved by nothing and rest.  Special tests included:  MRI.    Work activity restrictions: self restricted activity.             Objective:  Standing Alignment:    Cervical/Thoracic:  Forward head and cervical lordosis increased  Shoulder/UE:  Rounded shoulders     Cervical/Thoracic Evaluation  AROM:  AROM Cervical:  Flexion:            Mod - + neck pain  Extension:       Major - +++ pain  Rotation:         Left: Mod ++ pain     Right: Mod +++ pain  Side Bend:      Left: major +++ pain     Right:  Mod ++ pain  Cervical Myotomes:    C4 (shrug):  Left: 5    Right: 5  C5 (Deltoid):  Left: 5    Right: 5  C6 (Biceps):  Left: 5    Right: 5-  C7 (Triceps):  Left: 5    Right: 5-  C8 (Thumb Ext): Left: 5    Right: 4  T1 (Intrinsics): Left: 5    Right: 5-    Neural Tension:      Right side:  Uriah Cervical positive.    Cervical Palpation:    Tenderness present at Left:    Erector Spinae and Suboccipitals  Tenderness present at Right:    Upper Trap; Levator; Erector Spinae and Suboccipitals  General   ROS    Assessment/Plan:    Patient is a 75 year old male with cervical complaints.    Patient has the following significant findings with corresponding treatment plan.                Diagnosis 1:  C6 radiculopathy  Pain -  hot/cold therapy, mechanical traction, manual therapy, self management, education, directional preference exercise and home program  Decreased ROM/flexibility - manual therapy, therapeutic exercise and home program  Decreased joint mobility - manual therapy, therapeutic exercise and home program  Decreased strength - therapeutic exercise, therapeutic activities and home program  Decreased proprioception - neuro re-education, therapeutic activities and home program  Impaired posture - neuro re-education, therapeutic activities and home program        PATIENTS NAME:  Alonso Churchill   : 1945    Therapy  "Evaluation Codes:   Cumulative Therapy Evaluation is: Low complexity.    Communication ability:  Patient appears to be able to clearly communicate and understand verbal and written communication and follow directions correctly.  Treatment Explanation - The following has been discussed with the patient:   RX ordered/plan of care  Anticipated outcomes  Possible risks and side effects  This patient would benefit from PT intervention to resume normal activities.   Rehab potential is good.  Frequency:  2 X week, once daily  Duration:  for3 weeks tapering to 1 X a week over 6 weeks  Discharge Plan:  Achieve all LTG.  Independent in home treatment program.  Reach maximal therapeutic benefit.  Patient may benefit from home traction unit.      Caregiver Signature/Credentials _____________________________ Date ________       Treating Provider: Lauren Westfall, DPT 8606   I have reviewed and certified the need for these services and plan of treatment while under my care.        PHYSICIAN'S SIGNATURE:   ____________________________________  Date___________                   Denis Olson MD    Certification period:  Beginning of Cert date period: 09/10/20 to  End of Cert period date: 12/08/20     Functional Level Progress Report: Please see attached \"Goal Flow sheet for Functional level.\"    ____X____ Continue Services or________ DC Services                Service dates: From  SOC Date: 09/10/20 date to present                         "

## 2020-09-14 ENCOUNTER — THERAPY VISIT (OUTPATIENT)
Dept: PHYSICAL THERAPY | Facility: CLINIC | Age: 75
End: 2020-09-14
Payer: MEDICARE

## 2020-09-14 DIAGNOSIS — M54.12 CERVICAL RADICULOPATHY: ICD-10-CM

## 2020-09-14 PROCEDURE — 97530 THERAPEUTIC ACTIVITIES: CPT | Mod: GP | Performed by: PHYSICAL THERAPIST

## 2020-09-14 PROCEDURE — 97110 THERAPEUTIC EXERCISES: CPT | Mod: GP | Performed by: PHYSICAL THERAPIST

## 2020-09-14 PROCEDURE — 97012 MECHANICAL TRACTION THERAPY: CPT | Mod: GP | Performed by: PHYSICAL THERAPIST

## 2020-09-15 DIAGNOSIS — E78.5 HYPERLIPIDEMIA LDL GOAL <130: ICD-10-CM

## 2020-09-15 DIAGNOSIS — R23.3 EASY BRUISING: ICD-10-CM

## 2020-09-15 DIAGNOSIS — R79.1 ELEVATED INR: ICD-10-CM

## 2020-09-15 DIAGNOSIS — I10 ESSENTIAL HYPERTENSION WITH GOAL BLOOD PRESSURE LESS THAN 140/90: ICD-10-CM

## 2020-09-15 DIAGNOSIS — Z12.5 SCREENING FOR PROSTATE CANCER: ICD-10-CM

## 2020-09-15 LAB
ALBUMIN SERPL-MCNC: 3.6 G/DL (ref 3.4–5)
ALP SERPL-CCNC: 66 U/L (ref 40–150)
ALT SERPL W P-5'-P-CCNC: 23 U/L (ref 0–70)
ANION GAP SERPL CALCULATED.3IONS-SCNC: 3 MMOL/L (ref 3–14)
AST SERPL W P-5'-P-CCNC: 18 U/L (ref 0–45)
BILIRUB SERPL-MCNC: 0.7 MG/DL (ref 0.2–1.3)
BUN SERPL-MCNC: 12 MG/DL (ref 7–30)
CALCIUM SERPL-MCNC: 9.3 MG/DL (ref 8.5–10.1)
CHLORIDE SERPL-SCNC: 108 MMOL/L (ref 94–109)
CHOLEST SERPL-MCNC: 154 MG/DL
CO2 SERPL-SCNC: 29 MMOL/L (ref 20–32)
CREAT SERPL-MCNC: 0.89 MG/DL (ref 0.66–1.25)
CREAT UR-MCNC: 102 MG/DL
GFR SERPL CREATININE-BSD FRML MDRD: 83 ML/MIN/{1.73_M2}
GLUCOSE SERPL-MCNC: 111 MG/DL (ref 70–99)
HDLC SERPL-MCNC: 82 MG/DL
INR PPP: 1.13 (ref 0.86–1.14)
LDLC SERPL CALC-MCNC: 64 MG/DL
MICROALBUMIN UR-MCNC: 7 MG/L
MICROALBUMIN/CREAT UR: 6.97 MG/G CR (ref 0–17)
NONHDLC SERPL-MCNC: 72 MG/DL
POTASSIUM SERPL-SCNC: 4.4 MMOL/L (ref 3.4–5.3)
PROT SERPL-MCNC: 7.4 G/DL (ref 6.8–8.8)
PSA SERPL-ACNC: 4.35 UG/L (ref 0–4)
SODIUM SERPL-SCNC: 140 MMOL/L (ref 133–144)
TRIGL SERPL-MCNC: 40 MG/DL

## 2020-09-15 PROCEDURE — 82043 UR ALBUMIN QUANTITATIVE: CPT | Performed by: INTERNAL MEDICINE

## 2020-09-15 PROCEDURE — G0103 PSA SCREENING: HCPCS | Performed by: INTERNAL MEDICINE

## 2020-09-15 PROCEDURE — 80053 COMPREHEN METABOLIC PANEL: CPT | Performed by: INTERNAL MEDICINE

## 2020-09-15 PROCEDURE — 36415 COLL VENOUS BLD VENIPUNCTURE: CPT | Performed by: INTERNAL MEDICINE

## 2020-09-15 PROCEDURE — 80061 LIPID PANEL: CPT | Performed by: INTERNAL MEDICINE

## 2020-09-15 PROCEDURE — 85610 PROTHROMBIN TIME: CPT | Performed by: INTERNAL MEDICINE

## 2020-09-16 ENCOUNTER — THERAPY VISIT (OUTPATIENT)
Dept: PHYSICAL THERAPY | Facility: CLINIC | Age: 75
End: 2020-09-16
Payer: MEDICARE

## 2020-09-16 ENCOUNTER — TELEPHONE (OUTPATIENT)
Dept: FAMILY MEDICINE | Facility: CLINIC | Age: 75
End: 2020-09-16

## 2020-09-16 DIAGNOSIS — R97.20 ELEVATED PROSTATE SPECIFIC ANTIGEN (PSA): Primary | ICD-10-CM

## 2020-09-16 DIAGNOSIS — M54.12 CERVICAL RADICULOPATHY: ICD-10-CM

## 2020-09-16 PROCEDURE — 97012 MECHANICAL TRACTION THERAPY: CPT | Mod: GP | Performed by: PHYSICAL THERAPIST

## 2020-09-16 PROCEDURE — 97110 THERAPEUTIC EXERCISES: CPT | Mod: GP | Performed by: PHYSICAL THERAPIST

## 2020-09-16 PROCEDURE — 97035 APP MDLTY 1+ULTRASOUND EA 15: CPT | Mod: GP | Performed by: PHYSICAL THERAPIST

## 2020-09-18 DIAGNOSIS — M79.2 RADICULAR PAIN IN RIGHT ARM: ICD-10-CM

## 2020-09-18 RX ORDER — GABAPENTIN 300 MG/1
300 CAPSULE ORAL AT BEDTIME
Qty: 90 CAPSULE | Refills: 0 | Status: SHIPPED | OUTPATIENT
Start: 2020-09-18 | End: 2021-09-23

## 2020-09-18 NOTE — TELEPHONE ENCOUNTER
Routing refill request to provider for review/approval because:  Drug not on the [facility] refill protocol     Requested Prescriptions   Pending Prescriptions Disp Refills     gabapentin (NEURONTIN) 300 MG capsule 90 capsule 0     Sig: Take 1 capsule (300 mg) by mouth At Bedtime       There is no refill protocol information for this order        Mali Moulton RN, BSN, PHN

## 2020-09-21 ENCOUNTER — THERAPY VISIT (OUTPATIENT)
Dept: PHYSICAL THERAPY | Facility: CLINIC | Age: 75
End: 2020-09-21
Payer: MEDICARE

## 2020-09-21 DIAGNOSIS — M54.12 CERVICAL RADICULOPATHY: ICD-10-CM

## 2020-09-21 PROCEDURE — 97012 MECHANICAL TRACTION THERAPY: CPT | Mod: GP | Performed by: PHYSICAL THERAPIST

## 2020-09-21 PROCEDURE — 97140 MANUAL THERAPY 1/> REGIONS: CPT | Mod: GP | Performed by: PHYSICAL THERAPIST

## 2020-09-21 PROCEDURE — 97035 APP MDLTY 1+ULTRASOUND EA 15: CPT | Mod: GP | Performed by: PHYSICAL THERAPIST

## 2020-09-21 ASSESSMENT — ENCOUNTER SYMPTOMS
JOINT SWELLING: 0
MYALGIAS: 1
COUGH: 0
PALPITATIONS: 0
ARTHRALGIAS: 1
CHILLS: 0
SORE THROAT: 0
HEARTBURN: 0
ABDOMINAL PAIN: 0
DYSURIA: 0
EYE PAIN: 0
WEAKNESS: 0
PARESTHESIAS: 0
FREQUENCY: 0
SHORTNESS OF BREATH: 0
DIZZINESS: 0
HEMATURIA: 0
NERVOUS/ANXIOUS: 0
HEMATOCHEZIA: 1
HEADACHES: 0
DIARRHEA: 0
CONSTIPATION: 0
NAUSEA: 0
FEVER: 0

## 2020-09-21 ASSESSMENT — ACTIVITIES OF DAILY LIVING (ADL): CURRENT_FUNCTION: NO ASSISTANCE NEEDED

## 2020-09-22 ENCOUNTER — OFFICE VISIT (OUTPATIENT)
Dept: FAMILY MEDICINE | Facility: CLINIC | Age: 75
End: 2020-09-22
Payer: MEDICARE

## 2020-09-22 VITALS
RESPIRATION RATE: 18 BRPM | HEART RATE: 76 BPM | SYSTOLIC BLOOD PRESSURE: 136 MMHG | BODY MASS INDEX: 23.95 KG/M2 | TEMPERATURE: 98.1 F | OXYGEN SATURATION: 100 % | HEIGHT: 66 IN | DIASTOLIC BLOOD PRESSURE: 82 MMHG | WEIGHT: 149 LBS

## 2020-09-22 DIAGNOSIS — E78.5 HYPERLIPIDEMIA LDL GOAL <130: ICD-10-CM

## 2020-09-22 DIAGNOSIS — K92.1 HEMATOCHEZIA: ICD-10-CM

## 2020-09-22 DIAGNOSIS — R97.20 ELEVATED PROSTATE SPECIFIC ANTIGEN (PSA): ICD-10-CM

## 2020-09-22 DIAGNOSIS — Z00.00 MEDICARE ANNUAL WELLNESS VISIT, SUBSEQUENT: Primary | ICD-10-CM

## 2020-09-22 DIAGNOSIS — I10 ESSENTIAL HYPERTENSION WITH GOAL BLOOD PRESSURE LESS THAN 140/90: ICD-10-CM

## 2020-09-22 DIAGNOSIS — Z23 NEED FOR PROPHYLACTIC VACCINATION AND INOCULATION AGAINST INFLUENZA: ICD-10-CM

## 2020-09-22 PROCEDURE — 99213 OFFICE O/P EST LOW 20 MIN: CPT | Mod: 25 | Performed by: INTERNAL MEDICINE

## 2020-09-22 PROCEDURE — G0439 PPPS, SUBSEQ VISIT: HCPCS | Performed by: INTERNAL MEDICINE

## 2020-09-22 PROCEDURE — 90662 IIV NO PRSV INCREASED AG IM: CPT | Performed by: INTERNAL MEDICINE

## 2020-09-22 PROCEDURE — G0008 ADMIN INFLUENZA VIRUS VAC: HCPCS | Performed by: INTERNAL MEDICINE

## 2020-09-22 RX ORDER — SIMVASTATIN 20 MG
20 TABLET ORAL AT BEDTIME
Qty: 90 TABLET | Refills: 3 | Status: SHIPPED | OUTPATIENT
Start: 2020-09-22 | End: 2021-09-21

## 2020-09-22 RX ORDER — LOSARTAN POTASSIUM 50 MG/1
TABLET ORAL
Qty: 90 TABLET | Refills: 2 | Status: SHIPPED | OUTPATIENT
Start: 2020-09-22 | End: 2021-09-21

## 2020-09-22 ASSESSMENT — ENCOUNTER SYMPTOMS
CONSTIPATION: 0
FEVER: 0
CHILLS: 0
SHORTNESS OF BREATH: 0
HEADACHES: 0
HEMATURIA: 0
JOINT SWELLING: 0
WEAKNESS: 0
PALPITATIONS: 0
DIZZINESS: 0
ARTHRALGIAS: 1
NERVOUS/ANXIOUS: 0
NAUSEA: 0
EYE PAIN: 0
DIARRHEA: 0
DYSURIA: 0
PARESTHESIAS: 0
ABDOMINAL PAIN: 0
MYALGIAS: 1
HEMATOCHEZIA: 1
SORE THROAT: 0
FREQUENCY: 0
HEARTBURN: 0
COUGH: 0

## 2020-09-22 ASSESSMENT — PAIN SCALES - GENERAL: PAINLEVEL: SEVERE PAIN (6)

## 2020-09-22 ASSESSMENT — ACTIVITIES OF DAILY LIVING (ADL): CURRENT_FUNCTION: NO ASSISTANCE NEEDED

## 2020-09-22 ASSESSMENT — MIFFLIN-ST. JEOR: SCORE: 1348.61

## 2020-09-22 NOTE — PROGRESS NOTES
"SUBJECTIVE:   Alonso Churchill is a 75 year old male who presents for Preventive Visit.      Patient has been advised of split billing requirements and indicates understanding: Yes   Are you in the first 12 months of your Medicare coverage?  No    Healthy Habits:     In general, how would you rate your overall health?  Fair    Duration of exercise:  15-30 minutes    Do you usually eat at least 4 servings of fruit and vegetables a day, include whole grains    & fiber and avoid regularly eating high fat or \"junk\" foods?  Yes    Taking medications regularly:  Yes    Ability to successfully perform activities of daily living:  No assistance needed    Home Safety:  No safety concerns identified    Hearing Impairment:  Difficulty following a conversation in a noisy restaurant or crowded room, feel that people are mumbling or not speaking clearly, need to ask people to speak up or repeat themselves, difficulty understanding soft or whispered speech and difficulty understanding speech on the telephone    In the past 6 months, have you been bothered by leaking of urine?  No    In general, how would you rate your overall mental or emotional health?  Fair      PHQ-2 Total Score: 0    Additional concerns today:  Yes    Do you feel safe in your environment? Yes    Have you ever done Advance Care Planning? (For example, a Health Directive, POLST, or a discussion with a medical provider or your loved ones about your wishes): Yes, advance care planning is on file.      Fall risk  Fallen 2 or more times in the past year?: No  Any fall with injury in the past year?: No    Cognitive Screening   1) Repeat 3 items (Leader, Season, Table)    2) Clock draw: NORMAL  3) 3 item recall: Recalls 3 objects  Results: 3 items recalled: COGNITIVE IMPAIRMENT LESS LIKELY    Mini-CogTM Copyright NICKI Daniel. Licensed by the author for use in [facility]; reprinted with permission (nima@.[email or web address]). All rights reserved.      Do you have sleep " apnea, excessive snoring or daytime drowsiness?: no    Reviewed and updated as needed this visit by clinical staff  Tobacco  Allergies  Meds  Problems  Med Hx  Surg Hx  Fam Hx  Soc Hx          Reviewed and updated as needed this visit by Provider  Problems        Social History     Tobacco Use     Smoking status: Former Smoker     Packs/day: 0.50     Years: 10.00     Pack years: 5.00     Types: Cigarettes     Smokeless tobacco: Never Used     Tobacco comment: 35 years    Substance Use Topics     Alcohol use: Yes     Alcohol/week: 0.0 standard drinks     Comment: glass of wine per day or less         Alcohol Use 9/21/2020   Prescreen: >3 drinks/day or >7 drinks/week? No   Prescreen: >3 drinks/day or >7 drinks/week? -             Current providers sharing in care for this patient include:   Patient Care Team:  Yvonne Granado MD as PCP - General (Family Practice)  Yvonne Granado MD as Assigned PCP    The following health maintenance items are reviewed in Epic and correct as of today:  Health Maintenance   Topic Date Due     INFLUENZA VACCINE (1) 09/01/2020     FALL RISK ASSESSMENT  09/19/2020     MEDICARE ANNUAL WELLNESS VISIT  09/19/2020     ADVANCE CARE PLANNING  06/23/2025     LIPID  09/15/2025     DTAP/TDAP/TD IMMUNIZATION (4 - Td) 09/15/2026     COLORECTAL CANCER SCREENING  09/20/2028     PHQ-2  Completed     PNEUMOCOCCAL IMMUNIZATION 65+ LOW/MEDIUM RISK  Completed     ZOSTER IMMUNIZATION  Completed     IPV IMMUNIZATION  Aged Out     MENINGITIS IMMUNIZATION  Aged Out     HEPATITIS B IMMUNIZATION  Aged Out     Labs reviewed in EPIC      Review of Systems   Constitutional: Negative for chills and fever.   HENT: Positive for hearing loss. Negative for congestion, ear pain and sore throat.    Eyes: Negative for pain and visual disturbance.   Respiratory: Negative for cough and shortness of breath.    Cardiovascular: Negative for chest pain, palpitations and peripheral edema.   Gastrointestinal:  "Positive for hematochezia. Negative for abdominal pain, constipation, diarrhea, heartburn and nausea.   Genitourinary: Positive for impotence. Negative for discharge, dysuria, frequency, genital sores, hematuria and urgency.   Musculoskeletal: Positive for arthralgias and myalgias. Negative for joint swelling.   Skin: Negative for rash.   Neurological: Negative for dizziness, weakness, headaches and paresthesias.   Psychiatric/Behavioral: Negative for mood changes. The patient is not nervous/anxious.          OBJECTIVE:   /82   Pulse 76   Temp 98.1  F (36.7  C) (Oral)   Resp 18   Ht 1.676 m (5' 6\")   Wt 67.6 kg (149 lb)   SpO2 100%   BMI 24.05 kg/m   Estimated body mass index is 24.05 kg/m  as calculated from the following:    Height as of this encounter: 1.676 m (5' 6\").    Weight as of this encounter: 67.6 kg (149 lb).  Physical Exam  GENERAL: healthy, alert and no distress  EYES: Eyes grossly normal to inspection, PERRL and conjunctivae and sclerae normal  HENT: ear canals and TM's normal, nose and mouth without ulcers or lesions  NECK: no adenopathy, no asymmetry, masses, or scars and thyroid normal to palpation  RESP: lungs clear to auscultation - no rales, rhonchi or wheezes  CV: regular rate and rhythm, normal S1 S2, no S3 or S4, no murmur, click or rub, no peripheral edema and peripheral pulses strong  ABDOMEN: soft, nontender, no hepatosplenomegaly, no masses and bowel sounds normal  MS: no gross musculoskeletal defects noted, no edema  SKIN: no suspicious lesions or rashes  NEURO: Normal strength and tone, mentation intact and speech normal  PSYCH: mentation appears normal, affect normal/bright        ASSESSMENT / PLAN:   1. Medicare annual wellness visit, subsequent  Doing very well  Discussed about elevated PSA  He is going to [place] for the winter  Refill medications  He is also going to have a hearing check   2. Need for prophylactic vaccination and inoculation against influenza  - " "FLUZONE HIGH DOSE 65+  [75273]  - ADMIN INFLUENZA (For MEDICARE Patients ONLY) []    3. Essential hypertension with goal blood pressure less than 140/90  Blood pressure is high today but patient tells me that it is because he has pain in the left arm  He is going to continue to monitor it and let me know  - losartan (COZAAR) 50 MG tablet; Take one tablet daily  Dispense: 90 tablet; Refill: 2    4. Hyperlipidemia LDL goal <130  - simvastatin (ZOCOR) 20 MG tablet; Take 1 tablet (20 mg) by mouth At Bedtime  Dispense: 90 tablet; Refill: 3    5. Elevated prostate specific antigen (PSA)  Seeing urologist tomorrow  6. Hematochezia  Having some hematochezia on and off but reviewed the colonoscopy in 2018 which showed some diverticulosis and hemorrhoids patient thinks it is from his hemorrhoids      Patient has been advised of split billing requirements and indicates understanding: Yes  COUNSELING:  Reviewed preventive health counseling, as reflected in patient instructions       Regular exercise       Healthy diet/nutrition       Hearing screening       Immunizations    Vaccinated for: Influenza             Colon cancer screening       Prostate cancer screening    Estimated body mass index is 24.05 kg/m  as calculated from the following:    Height as of this encounter: 1.676 m (5' 6\").    Weight as of this encounter: 67.6 kg (149 lb).        He reports that he has quit smoking. His smoking use included cigarettes. He has a 5.00 pack-year smoking history. He has never used smokeless tobacco.      Appropriate preventive services were discussed with this patient, including applicable screening as appropriate for cardiovascular disease, diabetes, osteopenia/osteoporosis, and glaucoma.  As appropriate for age/gender, discussed screening for colorectal cancer, prostate cancer, breast cancer, and cervical cancer. Checklist reviewing preventive services available has been given to the patient.    Reviewed patients plan of care " and provided an AVS. The Basic Care Plan (routine screening as documented in Health Maintenance) for Alonso meets the Care Plan requirement. This Care Plan has been established and reviewed with the Patient.    Counseling Resources:  ATP IV Guidelines  Pooled Cohorts Equation Calculator  Breast Cancer Risk Calculator  Breast Cancer: Medication to Reduce Risk  FRAX Risk Assessment  ICSI Preventive Guidelines  Dietary Guidelines for Americans, 2010  [employer]'s MyPlate  ASA Prophylaxis  Lung CA Screening    Oumar Luke MD  [facility]    Identified Health Risks:

## 2020-09-22 NOTE — PATIENT INSTRUCTIONS
Patient Education     Step-by-Step  Checking Your Blood Pressure    Date Last Reviewed: 4/27/2016 2000-2019 [employer]. [address], [place] [address]. All rights reserved. This information is not intended as a substitute for professional medical care. Always follow your healthcare professional's instructions.

## 2020-09-23 ENCOUNTER — THERAPY VISIT (OUTPATIENT)
Dept: PHYSICAL THERAPY | Facility: CLINIC | Age: 75
End: 2020-09-23
Payer: MEDICARE

## 2020-09-23 ENCOUNTER — VIRTUAL VISIT (OUTPATIENT)
Dept: UROLOGY | Facility: CLINIC | Age: 75
End: 2020-09-23
Attending: INTERNAL MEDICINE
Payer: MEDICARE

## 2020-09-23 DIAGNOSIS — M54.12 CERVICAL RADICULOPATHY: ICD-10-CM

## 2020-09-23 DIAGNOSIS — R97.20 ELEVATED PROSTATE SPECIFIC ANTIGEN (PSA): ICD-10-CM

## 2020-09-23 DIAGNOSIS — Z12.5 SCREENING FOR PROSTATE CANCER: Primary | ICD-10-CM

## 2020-09-23 DIAGNOSIS — Z80.42 FAMILY HX OF PROSTATE CANCER: ICD-10-CM

## 2020-09-23 PROCEDURE — 97140 MANUAL THERAPY 1/> REGIONS: CPT | Mod: GP | Performed by: PHYSICAL THERAPIST

## 2020-09-23 PROCEDURE — 99442 ZZC PHYSICIAN TELEPHONE EVALUATION 11-20 MIN: CPT | Performed by: UROLOGY

## 2020-09-23 PROCEDURE — 97012 MECHANICAL TRACTION THERAPY: CPT | Mod: GP | Performed by: PHYSICAL THERAPIST

## 2020-09-23 PROCEDURE — 97035 APP MDLTY 1+ULTRASOUND EA 15: CPT | Mod: GP | Performed by: PHYSICAL THERAPIST

## 2020-09-23 NOTE — LETTER
"9/23/2020       RE: Alonso Churchill  [address]  [place] [address]     Dear Colleague,    Thank you for referring your patient, Alonso Churchill, to the [facility] at [facility]. Please see a copy of my visit note below.    Alonso Churchill is a 75 year old male who is being evaluated via a billable video visit.      The patient has been notified of following:     \"This video visit will be conducted via a call between you and your physician/provider. We have found that certain health care needs can be provided without the need for an in-person physical exam.  This service lets us provide the care you need with a video conversation.  If a prescription is necessary we can send it directly to your pharmacy.  If lab work is needed we can place an order for that and you can then stop by our lab to have the test done at a later time.    Video visits are billed at different rates depending on your insurance coverage.  Please reach out to your insurance provider with any questions.    If during the course of the call the physician/provider feels a video visit is not appropriate, you will not be charged for this service.\"    Patient has given verbal consent for Video visit? Yes  How would you like to obtain your AVS? MyChart  If you are dropped from the video visit, the video invite should be resent to: Send to e-mail at: [email or web address]  Will anyone else be joining your video visit? No        Video-Visit Details    They could not get their video to work.  I called him on the phone.  13 min call, starting 1:13PM.    Originating Location (pt. Location): Home    Distant Location (provider location):  [facility]     Platform used for Video Visit: AmWell / phone call    I am seeing Alonso Churchill in consultation from Oumar Luke  for evaluation of elevated PSA.    HPI:  Alonso Churchill is a 75 year old male with elevated " screening PSA.  He has a brother with history of prostate cancer.    Asymptomatic, no gross hematuria.  Elevated screening PSA only.    REVIEW OF SYSTEMS:  General: negative  Skin: negative  Eyes: negative  Ears/Nose/Throat: negative  Respiratory: negative  Cardiovascular: negative  Gastrointestinal: negative  Genitourinary: see HPI  Musculoskeletal: negative  Neurologic: negative  Psychiatric: negative  Hematologic/Lymphatic/Immunologic: negative  Endocrine: negative    PAST MEDICAL HX:  Past Medical History:   Diagnosis Date     Actinic keratosis      BPH      Cervical stenosis of spinal canal 9/12/2014     Hyperlipidemia      Hypertension      IBS (irritable bowel syndrome)      Insomnia        PAST SURG HX:  Past Surgical History:   Procedure Laterality Date     COLONOSCOPY WITH CO2 INSUFFLATION N/A 9/20/2018    Procedure: COLONOSCOPY WITH CO2 INSUFFLATION;  Colonoscopy;  Surgeon: Duane, William Charles, MD;  Location: [facility] OR     HEMORRHOIDECTOMY BANDING  2017     ROTATOR CUFF REPAIR RT/LT  3/2012    right  - dr. benavidez     SURGICAL HISTORY OF -   disc surgery     SURGICAL HISTORY OF -   back surgery        FAMILY HX:  Family History   Problem Relation Age of Onset     Heart Disease Father      Cerebrovascular Disease Father      Prostate Cancer Brother         had surgery       SOCIAL HX:  Social History     Tobacco Use     Smoking status: Former Smoker     Packs/day: 0.50     Years: 10.00     Pack years: 5.00     Types: Cigarettes     Smokeless tobacco: Never Used     Tobacco comment: 35 years    Substance Use Topics     Alcohol use: Yes     Alcohol/week: 0.0 standard drinks     Comment: glass of wine per day or less     Drug use: No       MEDICATIONS:  Current Outpatient Medications   Medication Sig     cholecalciferol (VITAMIN D) 1000 UNIT tablet Take 1 tablet (1,000 Units) by mouth daily     Cyanocobalamin (B-12 PO) Take 400 mg by mouth     gabapentin (NEURONTIN) 300 MG capsule Take 1 capsule (300 mg) by  mouth At Bedtime     losartan (COZAAR) 50 MG tablet Take one tablet daily     MELOXICAM PO Take 7.5 mg by mouth daily      MULTI-VITAMIN PO 1 Tablet every day     Omega-3 Fatty Acids (OMEGA-3 FISH OIL) 1200 MG CAPS Take by mouth 2 times daily     Psyllium (METAMUCIL PO) Take by mouth every morning     simvastatin (ZOCOR) 20 MG tablet Take 1 tablet (20 mg) by mouth At Bedtime     Zinc Sulfate (ZINC 15 PO) Take by mouth three times a week     No current facility-administered medications for this visit.        ALLERGIES:  Lisinopril      GENERAL PHYSICAL EXAM:   There were no vitals taken for this visit.    General: Alert, oriented, nad  Eyes: anicteric, EOMI.  Pulse: regular  Resps: normal, non-labored.  Abdomen:  nondistended.   exam deferred.     Imaging/labs:  Lab Results   Component Value Date    CR 0.89 09/15/2020    CR 0.88 09/19/2019    CR 0.90 09/10/2018     Lab Results   Component Value Date    PSA 4.35 09/15/2020    PSA 2.59 09/10/2018    PSA 2.23 09/02/2016    PSA 2.38 09/12/2014    PSA 1.79 09/11/2013    PSA 2.52 08/17/2010         ASSESSMENT:     Prostate cancer screening     Discussed that PSA is within age-adjusted normal range but above the generic range 0-4.    PLAN:    Recommended recheck PSA in 3-6 months.    Generally at his age we'd consider doing a prostate biopsy when PSA >6.5 ( some would say when PSA>10).    Offered prostate MRI or biopsy, we'll hold on these right now.    Plan recheck PSA 3-6 months and I'll contact him with results.  Likely recommend MRI/ biopsy if PSA remains elevated.    Discussed various permutations of prostate cancer screening / diagnosis.      Copied cc to Consulting provider Oumar Luke        Thank-you for the kind consultation.  Ignacio Fragoso MD     Urological Surgeon          Again, thank you for allowing me to participate in the care of your patient.      Sincerely,    Ignacio Fragoso MD

## 2020-09-23 NOTE — PROGRESS NOTES
"Alonso Churchill is a 75 year old male who is being evaluated via a billable video visit.      The patient has been notified of following:     \"This video visit will be conducted via a call between you and your physician/provider. We have found that certain health care needs can be provided without the need for an in-person physical exam.  This service lets us provide the care you need with a video conversation.  If a prescription is necessary we can send it directly to your pharmacy.  If lab work is needed we can place an order for that and you can then stop by our lab to have the test done at a later time.    Video visits are billed at different rates depending on your insurance coverage.  Please reach out to your insurance provider with any questions.    If during the course of the call the physician/provider feels a video visit is not appropriate, you will not be charged for this service.\"    Patient has given verbal consent for Video visit? Yes  How would you like to obtain your AVS? MyChart  If you are dropped from the video visit, the video invite should be resent to: Send to e-mail at: [email or web address]  Will anyone else be joining your video visit? No        Video-Visit Details    They could not get their video to work.  I called him on the phone.  13 min call, starting 1:13PM.    Originating Location (pt. Location): Home    Distant Location (provider location):  [facility]     Platform used for Video Visit: AmWell / phone call    I am seeing Alonso Churchill in consultation from Oumar Luke  for evaluation of elevated PSA.    HPI:  Alonso Churchill is a 75 year old male with elevated screening PSA.  He has a brother with history of prostate cancer.    Asymptomatic, no gross hematuria.  Elevated screening PSA only.    REVIEW OF SYSTEMS:  General: negative  Skin: negative  Eyes: negative  Ears/Nose/Throat: negative  Respiratory: negative  Cardiovascular: negative  Gastrointestinal: " negative  Genitourinary: see HPI  Musculoskeletal: negative  Neurologic: negative  Psychiatric: negative  Hematologic/Lymphatic/Immunologic: negative  Endocrine: negative    PAST MEDICAL HX:  Past Medical History:   Diagnosis Date     Actinic keratosis      BPH      Cervical stenosis of spinal canal 9/12/2014     Hyperlipidemia      Hypertension      IBS (irritable bowel syndrome)      Insomnia        PAST SURG HX:  Past Surgical History:   Procedure Laterality Date     COLONOSCOPY WITH CO2 INSUFFLATION N/A 9/20/2018    Procedure: COLONOSCOPY WITH CO2 INSUFFLATION;  Colonoscopy;  Surgeon: Duane, William Charles, MD;  Location: [facility] OR     HEMORRHOIDECTOMY BANDING  2017     ROTATOR CUFF REPAIR RT/LT  3/2012    right  - dr. benavidez     SURGICAL HISTORY OF -   disc surgery     SURGICAL HISTORY OF -   back surgery        FAMILY HX:  Family History   Problem Relation Age of Onset     Heart Disease Father      Cerebrovascular Disease Father      Prostate Cancer Brother         had surgery       SOCIAL HX:  Social History     Tobacco Use     Smoking status: Former Smoker     Packs/day: 0.50     Years: 10.00     Pack years: 5.00     Types: Cigarettes     Smokeless tobacco: Never Used     Tobacco comment: 35 years    Substance Use Topics     Alcohol use: Yes     Alcohol/week: 0.0 standard drinks     Comment: glass of wine per day or less     Drug use: No       MEDICATIONS:  Current Outpatient Medications   Medication Sig     cholecalciferol (VITAMIN D) 1000 UNIT tablet Take 1 tablet (1,000 Units) by mouth daily     Cyanocobalamin (B-12 PO) Take 400 mg by mouth     gabapentin (NEURONTIN) 300 MG capsule Take 1 capsule (300 mg) by mouth At Bedtime     losartan (COZAAR) 50 MG tablet Take one tablet daily     MELOXICAM PO Take 7.5 mg by mouth daily      MULTI-VITAMIN PO 1 Tablet every day     Omega-3 Fatty Acids (OMEGA-3 FISH OIL) 1200 MG CAPS Take by mouth 2 times daily     Psyllium (METAMUCIL PO) Take by mouth every morning      simvastatin (ZOCOR) 20 MG tablet Take 1 tablet (20 mg) by mouth At Bedtime     Zinc Sulfate (ZINC 15 PO) Take by mouth three times a week     No current facility-administered medications for this visit.        ALLERGIES:  Lisinopril      GENERAL PHYSICAL EXAM:   There were no vitals taken for this visit.    General: Alert, oriented, nad  Eyes: anicteric, EOMI.  Pulse: regular  Resps: normal, non-labored.  Abdomen:  nondistended.   exam deferred.     Imaging/labs:  Lab Results   Component Value Date    CR 0.89 09/15/2020    CR 0.88 09/19/2019    CR 0.90 09/10/2018     Lab Results   Component Value Date    PSA 4.35 09/15/2020    PSA 2.59 09/10/2018    PSA 2.23 09/02/2016    PSA 2.38 09/12/2014    PSA 1.79 09/11/2013    PSA 2.52 08/17/2010         ASSESSMENT:     Prostate cancer screening     Discussed that PSA is within age-adjusted normal range but above the generic range 0-4.    PLAN:    Recommended recheck PSA in 3-6 months.    Generally at his age we'd consider doing a prostate biopsy when PSA >6.5 ( some would say when PSA>10).    Offered prostate MRI or biopsy, we'll hold on these right now.    Plan recheck PSA 3-6 months and I'll contact him with results.  Likely recommend MRI/ biopsy if PSA remains elevated.    Discussed various permutations of prostate cancer screening / diagnosis.      Copied cc to Consulting provider Oumar Luke        Thank-you for the kind consultation.  Ignacio Fragoso MD     Urological Surgeon

## 2020-09-23 NOTE — PROGRESS NOTES
Subjective:  HPI  Physical Exam                    Objective:  System    Physical Exam    General     ROS    Assessment/Plan:    SUBJECTIVE  Subjective changes as noted by pt:   Pt. is much more comfortable now at rest thanbefore and can tolerate light physical activity at home w/out pain increases. He still has some difficulty sleeping and with more vigorous lifting/activity.   Current pain level:  5/10   Changes in function:  Yes (See Goal flowsheet attached for changes in current functional level)     Adverse reaction to treatment or activity:  None    OBJECTIVE  Changes in objective findings:  ROM cervical ext 60%; R SB 60%; R Rot 75%     ASSESSMENT  Alonso continues to require intervention to meet STG and LTG's: PT  Patient is progressing slower than expected.  Response to therapy has shown an improvement in  pain level, radicular symptoms and ROM   Progress made towards STG/LTG?  Yes (See Goal flowsheet attached for updates on achievement of STG and LTG)    PLAN  Current treatment program is being advanced to more complex exercises.    PTA/ATC plan:  N/A    Please refer to the daily flowsheet for treatment today, total treatment time and time spent performing 1:1 timed codes.

## 2020-09-28 ENCOUNTER — THERAPY VISIT (OUTPATIENT)
Dept: PHYSICAL THERAPY | Facility: CLINIC | Age: 75
End: 2020-09-28
Payer: MEDICARE

## 2020-09-28 DIAGNOSIS — M54.12 CERVICAL RADICULOPATHY: ICD-10-CM

## 2020-09-28 PROCEDURE — 97035 APP MDLTY 1+ULTRASOUND EA 15: CPT | Mod: GP | Performed by: PHYSICAL THERAPIST

## 2020-09-28 PROCEDURE — 97140 MANUAL THERAPY 1/> REGIONS: CPT | Mod: GP | Performed by: PHYSICAL THERAPIST

## 2020-09-28 PROCEDURE — 97012 MECHANICAL TRACTION THERAPY: CPT | Mod: GP | Performed by: PHYSICAL THERAPIST

## 2020-09-30 ENCOUNTER — THERAPY VISIT (OUTPATIENT)
Dept: PHYSICAL THERAPY | Facility: CLINIC | Age: 75
End: 2020-09-30
Payer: MEDICARE

## 2020-09-30 DIAGNOSIS — M54.12 CERVICAL RADICULOPATHY: ICD-10-CM

## 2020-09-30 PROCEDURE — 97035 APP MDLTY 1+ULTRASOUND EA 15: CPT | Mod: GP | Performed by: PHYSICAL THERAPIST

## 2020-09-30 PROCEDURE — 97012 MECHANICAL TRACTION THERAPY: CPT | Mod: GP | Performed by: PHYSICAL THERAPIST

## 2020-09-30 PROCEDURE — 97140 MANUAL THERAPY 1/> REGIONS: CPT | Mod: GP | Performed by: PHYSICAL THERAPIST

## 2020-09-30 NOTE — PROGRESS NOTES
Subjective:  HPI  Physical Exam                    Objective:  System    Physical Exam    General     ROS    Assessment/Plan:    PROGRESS  REPORT    Progress reporting period is from 9-10-20 to 9-30-20.       SUBJECTIVE  Subjective changes noted by patient:   Pt. has made moderate progress in PT the past 3 weeks for R cervical radiculopathy. He still has difficulty with sleeping at night and alos has pain with physical activities and prolonged sitting. However, his neck ROM is much better and the severity of his neck and arm pain is reduced. Pt. has an appointment with Dr. Renee 10-2 to seek direction on other treatment options.      Current pain level is 5/10.     Previous pain level was 10/10.   Changes in function:  Yes (See Goal flowsheet attached for changes in current functional level)  Adverse reaction to treatment or activity: None    OBJECTIVE  Changes noted in objective findings:  ROM cervical ext 75%; R Rot 90%; L Rot 75%     ASSESSMENT/PLAN  Updated problem list and treatment plan: Diagnosis 1:  R cervical radiculopathy  Pain -  US, mechanical traction, manual therapy, self management and education  Decreased ROM/flexibility - manual therapy and therapeutic exercise  Impaired muscle performance - neuro re-education  Decreased function - therapeutic activities  Impaired posture - neuro re-education  STG/LTGs have been met or progress has been made towards goals:  Yes (See Goal flow sheet completed today.)  Assessment of Progress: The patient's condition is improving slowly.  Self Management Plans:  Patient has been instructed in a home treatment program.  Patient  has been instructed in self management of symptoms.  I have re-evaluated this patient and find that the nature, scope, duration and intensity of the therapy is appropriate for the medical condition of the patient.  Alonso continues to require the following intervention to meet STG and LTG's:  PT    Recommendations:  This patient would benefit  from continued therapy.     Frequency:  1 X week, once daily  Duration:  for 4 weeks        Please refer to the daily flowsheet for treatment today, total treatment time and time spent performing 1:1 timed codes.

## 2020-10-02 ENCOUNTER — OFFICE VISIT (OUTPATIENT)
Dept: NEUROLOGY | Facility: CLINIC | Age: 75
End: 2020-10-02
Payer: MEDICARE

## 2020-10-02 VITALS
DIASTOLIC BLOOD PRESSURE: 70 MMHG | HEIGHT: 67 IN | HEART RATE: 64 BPM | OXYGEN SATURATION: 97 % | SYSTOLIC BLOOD PRESSURE: 147 MMHG | RESPIRATION RATE: 12 BRPM | WEIGHT: 149 LBS | BODY MASS INDEX: 23.39 KG/M2

## 2020-10-02 DIAGNOSIS — M54.12 CERVICAL RADICULOPATHY: Primary | ICD-10-CM

## 2020-10-02 PROCEDURE — 99203 OFFICE O/P NEW LOW 30 MIN: CPT | Performed by: NEUROLOGICAL SURGERY

## 2020-10-02 ASSESSMENT — MIFFLIN-ST. JEOR: SCORE: 1364.49

## 2020-10-02 NOTE — LETTER
10/2/2020         RE: Alonso Churchill  [address]  [place] [address]        Dear Colleague,    Thank you for referring your patient, Alonso Churchill, to the [facility] [place]. Please see a copy of my visit note below.    Mr. Churchill is a 75-year-old man seen today for greater than 8-week complaint of cervical radiculopathy in a C6 pattern predominantly toward the right side.  He has been treated with a course of physical therapy to include 6 sessions of cervical traction.  He estimates that his symptoms have improved approximately 50%.  He continues however to have numbness into his right thumb associated with paresthesias and pain in a right C6 dermatomal pattern.  He denies focal weakness in his upper extremities.  He has no complaint of difficulty with ambulation or changes in bowel or bladder control.  He denies any specific injury to his neck.    On examination, he has 5/5 strength throughout his bilateral upper extremities including deltoids, biceps, triceps, brachioradialis and hand intrinsics bilaterally as well as wrist extensors and flexors.  There is no evidence of intrinsic atrophy in his hands.  Deep tendon reflexes are 1-2+ equal and symmetric throughout his upper and lower extremities.  Gait appears to be normal.  There is evidence of a right C6 dermatomal disturbance which appears to be intermittent.    Review of his recent cervical MRI scan obtained on August 31, 2020 shows multilevel cervical spondylitic change most pronounced at the C5-6 level where he has mild cervical spinal canal stenosis on the basis of a marginal osteophyte associated with slight hypertrophy of the posterior ligament.  He is noted to have significant uncovertebral osteophytes present bilaterally at this level as well with the right side more predominant than the left producing fairly severe bilateral foraminal stenosis.  There is no evidence of intrinsic signal change  within the cervical spinal cord at this or any other level.  He is noted to have some disc degeneration at C3-4 and C4-5 although disc height is better preserved and focal disc protrusions are present in midline but do not appear to offer significant impingement of the cervical spinal cord and no evidence of radicular impingement.    Assessment: Bilateral C6 radicular symptoms, right side predominant in this otherwise healthy and active 75-year-old man with evidence of cervical spondylitic change which appears chronic at the C5-6 level.  He has evidence of objective sensory loss in a dermatomal he appropriate pattern but no evidence of reflex or motor changes and no evidence of cervical myelopathy.  He appears to have improved significantly over the past 2 months with physical therapy and supervised cervical traction.  I spoke with the patient and his wife at length regarding current clinical and radiographic findings as well as management alternatives, risks and benefits.  I reviewed the cervical MRI scan findings with him as well as the clinical findings and discussed the possibility of either surgical intervention or a program of home cervical traction.  Following a full discussion of potential risks and benefits of each approach patient appeared to be more interested in a trial of home cervical traction.  We will make appropriate arrangements with his ongoing physical therapy to instruct him in the use of a home traction unit and to attempt to obtain such a unit for his independent use.  I will plan to see him back in the office in 6 weeks for reevaluation.  If his symptoms continue to improve we will continue with nonoperative management.  I have advised him that should his symptoms increase in severity or should he determined that continued traction is not helpful then I have asked him to call or return to the office sooner for discussion regarding an anterior cervical discectomy and fusion at C5-6 for  decompression of the affected foramen and nerve roots.  He appeared to understand this discussion, asked appropriate questions which I answered and was willing to proceed as recommended.    Approximately 35 minutes was spent in direct contact with the patient the majority counseling him regarding clinical and radiographic findings, management alternatives, risks and benefits.      Again, thank you for allowing me to participate in the care of your patient.        Sincerely,        Diomedes Renee MD

## 2020-10-02 NOTE — NURSING NOTE
Cervical traction unit order faxed to [facility].   Nelda Almonte, RNCC  Neurology

## 2020-10-02 NOTE — PATIENT INSTRUCTIONS
We will reach out to the physical therapy dept to find out what needs to be done for the home cervical traction device.     Return in 5-6 weeks

## 2020-10-02 NOTE — PROGRESS NOTES
Mr. Churchill is a 75-year-old man seen today for greater than 8-week complaint of cervical radiculopathy in a C6 pattern predominantly toward the right side.  He has been treated with a course of physical therapy to include 6 sessions of cervical traction.  He estimates that his symptoms have improved approximately 50%.  He continues however to have numbness into his right thumb associated with paresthesias and pain in a right C6 dermatomal pattern.  He denies focal weakness in his upper extremities.  He has no complaint of difficulty with ambulation or changes in bowel or bladder control.  He denies any specific injury to his neck.    On examination, he has 5/5 strength throughout his bilateral upper extremities including deltoids, biceps, triceps, brachioradialis and hand intrinsics bilaterally as well as wrist extensors and flexors.  There is no evidence of intrinsic atrophy in his hands.  Deep tendon reflexes are 1-2+ equal and symmetric throughout his upper and lower extremities.  Gait appears to be normal.  There is evidence of a right C6 dermatomal disturbance which appears to be intermittent.    Review of his recent cervical MRI scan obtained on August 31, 2020 shows multilevel cervical spondylitic change most pronounced at the C5-6 level where he has mild cervical spinal canal stenosis on the basis of a marginal osteophyte associated with slight hypertrophy of the posterior ligament.  He is noted to have significant uncovertebral osteophytes present bilaterally at this level as well with the right side more predominant than the left producing fairly severe bilateral foraminal stenosis.  There is no evidence of intrinsic signal change within the cervical spinal cord at this or any other level.  He is noted to have some disc degeneration at C3-4 and C4-5 although disc height is better preserved and focal disc protrusions are present in midline but do not appear to offer significant impingement of the cervical  spinal cord and no evidence of radicular impingement.    Assessment: Bilateral C6 radicular symptoms, right side predominant in this otherwise healthy and active 75-year-old man with evidence of cervical spondylitic change which appears chronic at the C5-6 level.  He has evidence of objective sensory loss in a dermatomal he appropriate pattern but no evidence of reflex or motor changes and no evidence of cervical myelopathy.  He appears to have improved significantly over the past 2 months with physical therapy and supervised cervical traction.  I spoke with the patient and his wife at length regarding current clinical and radiographic findings as well as management alternatives, risks and benefits.  I reviewed the cervical MRI scan findings with him as well as the clinical findings and discussed the possibility of either surgical intervention or a program of home cervical traction.  Following a full discussion of potential risks and benefits of each approach patient appeared to be more interested in a trial of home cervical traction.  We will make appropriate arrangements with his ongoing physical therapy to instruct him in the use of a home traction unit and to attempt to obtain such a unit for his independent use.  I will plan to see him back in the office in 6 weeks for reevaluation.  If his symptoms continue to improve we will continue with nonoperative management.  I have advised him that should his symptoms increase in severity or should he determined that continued traction is not helpful then I have asked him to call or return to the office sooner for discussion regarding an anterior cervical discectomy and fusion at C5-6 for decompression of the affected foramen and nerve roots.  He appeared to understand this discussion, asked appropriate questions which I answered and was willing to proceed as recommended.    Approximately 35 minutes was spent in direct contact with the patient the majority counseling  him regarding clinical and radiographic findings, management alternatives, risks and benefits.

## 2020-10-05 ENCOUNTER — THERAPY VISIT (OUTPATIENT)
Dept: PHYSICAL THERAPY | Facility: CLINIC | Age: 75
End: 2020-10-05
Payer: MEDICARE

## 2020-10-05 DIAGNOSIS — M54.12 CERVICAL RADICULOPATHY: ICD-10-CM

## 2020-10-05 PROCEDURE — 97012 MECHANICAL TRACTION THERAPY: CPT | Mod: GP | Performed by: PHYSICAL THERAPIST

## 2020-10-05 PROCEDURE — 97140 MANUAL THERAPY 1/> REGIONS: CPT | Mod: GP | Performed by: PHYSICAL THERAPIST

## 2020-10-05 PROCEDURE — 97035 APP MDLTY 1+ULTRASOUND EA 15: CPT | Mod: GP | Performed by: PHYSICAL THERAPIST

## 2020-10-07 ENCOUNTER — THERAPY VISIT (OUTPATIENT)
Dept: PHYSICAL THERAPY | Facility: CLINIC | Age: 75
End: 2020-10-07
Payer: MEDICARE

## 2020-10-07 DIAGNOSIS — M54.12 CERVICAL RADICULOPATHY: ICD-10-CM

## 2020-10-07 PROCEDURE — 97012 MECHANICAL TRACTION THERAPY: CPT | Mod: GP | Performed by: PHYSICAL THERAPIST

## 2020-10-07 PROCEDURE — 97140 MANUAL THERAPY 1/> REGIONS: CPT | Mod: GP | Performed by: PHYSICAL THERAPIST

## 2020-10-07 PROCEDURE — 97035 APP MDLTY 1+ULTRASOUND EA 15: CPT | Mod: GP | Performed by: PHYSICAL THERAPIST

## 2020-10-12 ENCOUNTER — THERAPY VISIT (OUTPATIENT)
Dept: PHYSICAL THERAPY | Facility: CLINIC | Age: 75
End: 2020-10-12
Payer: MEDICARE

## 2020-10-12 DIAGNOSIS — M54.12 CERVICAL RADICULOPATHY: ICD-10-CM

## 2020-10-12 PROCEDURE — 97140 MANUAL THERAPY 1/> REGIONS: CPT | Mod: GP | Performed by: PHYSICAL THERAPIST

## 2020-10-12 PROCEDURE — 97035 APP MDLTY 1+ULTRASOUND EA 15: CPT | Mod: GP | Performed by: PHYSICAL THERAPIST

## 2020-10-12 PROCEDURE — 97012 MECHANICAL TRACTION THERAPY: CPT | Mod: GP | Performed by: PHYSICAL THERAPIST

## 2020-10-12 NOTE — PROGRESS NOTES
Subjective:  HPI  Physical Exam                    Objective:  System    Physical Exam    General     ROS    Assessment/Plan:    SUBJECTIVE  Subjective changes as noted by pt:   Pt. notes slight improvement in sleeping this week but mostly he remains the same this week. He feels he is about 50% improved from the beginning.   Current pain level:  5/10   Changes in function:  Yes (See Goal flowsheet attached for changes in current functional level)     Adverse reaction to treatment or activity:  None    OBJECTIVE  Changes in objective findings:  ROM - cerv ext 75%; R Rot 90%; L Rot 80%.      ASSESSMENT  Alonso continues to require intervention to meet STG and LTG's: PT  Patient is progressing as expected.  Response to therapy has shown an improvement in  pain level and ROM   Progress made towards STG/LTG?  Yes (See Goal flowsheet attached for updates on achievement of STG and LTG)    PLAN  Current treatment program is being advanced to more complex exercises.    PTA/ATC plan:  N/A    Please refer to the daily flowsheet for treatment today, total treatment time and time spent performing 1:1 timed codes.

## 2020-10-14 ENCOUNTER — THERAPY VISIT (OUTPATIENT)
Dept: PHYSICAL THERAPY | Facility: CLINIC | Age: 75
End: 2020-10-14
Payer: MEDICARE

## 2020-10-14 DIAGNOSIS — M54.12 CERVICAL RADICULOPATHY: ICD-10-CM

## 2020-10-14 PROCEDURE — 97140 MANUAL THERAPY 1/> REGIONS: CPT | Mod: GP | Performed by: PHYSICAL THERAPIST

## 2020-10-14 PROCEDURE — 97035 APP MDLTY 1+ULTRASOUND EA 15: CPT | Mod: GP | Performed by: PHYSICAL THERAPIST

## 2020-10-14 PROCEDURE — 97012 MECHANICAL TRACTION THERAPY: CPT | Mod: GP | Performed by: PHYSICAL THERAPIST

## 2020-10-19 ENCOUNTER — THERAPY VISIT (OUTPATIENT)
Dept: PHYSICAL THERAPY | Facility: CLINIC | Age: 75
End: 2020-10-19
Payer: MEDICARE

## 2020-10-19 DIAGNOSIS — M54.12 CERVICAL RADICULOPATHY: ICD-10-CM

## 2020-10-19 PROCEDURE — 97035 APP MDLTY 1+ULTRASOUND EA 15: CPT | Mod: GP | Performed by: PHYSICAL THERAPIST

## 2020-10-19 PROCEDURE — 97012 MECHANICAL TRACTION THERAPY: CPT | Mod: GP | Performed by: PHYSICAL THERAPIST

## 2020-10-19 PROCEDURE — 97140 MANUAL THERAPY 1/> REGIONS: CPT | Mod: GP | Performed by: PHYSICAL THERAPIST

## 2020-10-21 ENCOUNTER — THERAPY VISIT (OUTPATIENT)
Dept: PHYSICAL THERAPY | Facility: CLINIC | Age: 75
End: 2020-10-21
Payer: MEDICARE

## 2020-10-21 DIAGNOSIS — M54.12 CERVICAL RADICULOPATHY: ICD-10-CM

## 2020-10-21 PROCEDURE — 97035 APP MDLTY 1+ULTRASOUND EA 15: CPT | Mod: KX | Performed by: PHYSICAL THERAPIST

## 2020-10-21 PROCEDURE — 97140 MANUAL THERAPY 1/> REGIONS: CPT | Mod: KX | Performed by: PHYSICAL THERAPIST

## 2020-10-21 PROCEDURE — 97012 MECHANICAL TRACTION THERAPY: CPT | Mod: 59 | Performed by: PHYSICAL THERAPIST

## 2020-10-28 ENCOUNTER — THERAPY VISIT (OUTPATIENT)
Dept: PHYSICAL THERAPY | Facility: CLINIC | Age: 75
End: 2020-10-28
Payer: MEDICARE

## 2020-10-28 DIAGNOSIS — M54.12 CERVICAL RADICULOPATHY: ICD-10-CM

## 2020-10-28 PROCEDURE — 97035 APP MDLTY 1+ULTRASOUND EA 15: CPT | Mod: GP | Performed by: PHYSICAL THERAPIST

## 2020-10-28 PROCEDURE — 97012 MECHANICAL TRACTION THERAPY: CPT | Mod: GP | Performed by: PHYSICAL THERAPIST

## 2020-10-28 PROCEDURE — 97140 MANUAL THERAPY 1/> REGIONS: CPT | Mod: GP | Performed by: PHYSICAL THERAPIST

## 2020-10-28 NOTE — PROGRESS NOTES
Subjective:  HPI  Physical Exam                    Objective:  System    Physical Exam    General     ROS    Assessment/Plan:    SUBJECTIVE  Subjective changes as noted by pt:   Pt. picked up a home ctx unit today at home medical to trial at home. Overall he cont to note a gradual decrease in neck pain and R arm sx's.   Current pain level:  4/10   Changes in function:  Yes (See Goal flowsheet attached for changes in current functional level)     Adverse reaction to treatment or activity:  None    OBJECTIVE  Changes in objective findings:  ROM cerv ext 80%; R Rot WNL.      ASSESSMENT  Alonso continues to require intervention to meet STG and LTG's: PT  Patient's symptoms are resolving.  Response to therapy has shown an improvement in  pain level, radicular symptoms and ROM   Progress made towards STG/LTG?  Yes (See Goal flowsheet attached for updates on achievement of STG and LTG)    PLAN  Current treatment program is being advanced to more complex exercises.    PTA/ATC plan:  N/A    Please refer to the daily flowsheet for treatment today, total treatment time and time spent performing 1:1 timed codes.

## 2020-10-30 ENCOUNTER — THERAPY VISIT (OUTPATIENT)
Dept: PHYSICAL THERAPY | Facility: CLINIC | Age: 75
End: 2020-10-30
Payer: MEDICARE

## 2020-10-30 DIAGNOSIS — M54.12 CERVICAL RADICULOPATHY: ICD-10-CM

## 2020-10-30 PROCEDURE — 97035 APP MDLTY 1+ULTRASOUND EA 15: CPT | Mod: GP | Performed by: PHYSICAL THERAPIST

## 2020-10-30 PROCEDURE — 97012 MECHANICAL TRACTION THERAPY: CPT | Mod: GP | Performed by: PHYSICAL THERAPIST

## 2020-10-30 PROCEDURE — 97140 MANUAL THERAPY 1/> REGIONS: CPT | Mod: GP | Performed by: PHYSICAL THERAPIST

## 2020-11-04 ENCOUNTER — THERAPY VISIT (OUTPATIENT)
Dept: PHYSICAL THERAPY | Facility: CLINIC | Age: 75
End: 2020-11-04
Payer: MEDICARE

## 2020-11-04 DIAGNOSIS — M54.12 CERVICAL RADICULOPATHY: ICD-10-CM

## 2020-11-04 PROCEDURE — 97140 MANUAL THERAPY 1/> REGIONS: CPT | Mod: GP | Performed by: PHYSICAL THERAPIST

## 2020-11-04 PROCEDURE — 97012 MECHANICAL TRACTION THERAPY: CPT | Mod: GP | Performed by: PHYSICAL THERAPIST

## 2020-11-04 PROCEDURE — 97035 APP MDLTY 1+ULTRASOUND EA 15: CPT | Mod: GP | Performed by: PHYSICAL THERAPIST

## 2020-11-06 ENCOUNTER — THERAPY VISIT (OUTPATIENT)
Dept: PHYSICAL THERAPY | Facility: CLINIC | Age: 75
End: 2020-11-06
Payer: MEDICARE

## 2020-11-06 DIAGNOSIS — M54.12 CERVICAL RADICULOPATHY: ICD-10-CM

## 2020-11-06 PROCEDURE — 97012 MECHANICAL TRACTION THERAPY: CPT | Mod: GP | Performed by: PHYSICAL THERAPIST

## 2020-11-06 PROCEDURE — 97035 APP MDLTY 1+ULTRASOUND EA 15: CPT | Mod: GP | Performed by: PHYSICAL THERAPIST

## 2020-11-06 PROCEDURE — 97140 MANUAL THERAPY 1/> REGIONS: CPT | Mod: GP | Performed by: PHYSICAL THERAPIST

## 2020-11-09 ENCOUNTER — THERAPY VISIT (OUTPATIENT)
Dept: PHYSICAL THERAPY | Facility: CLINIC | Age: 75
End: 2020-11-09
Payer: MEDICARE

## 2020-11-09 DIAGNOSIS — M54.12 CERVICAL RADICULOPATHY: ICD-10-CM

## 2020-11-09 PROCEDURE — 97140 MANUAL THERAPY 1/> REGIONS: CPT | Mod: GP | Performed by: PHYSICAL THERAPIST

## 2020-11-09 PROCEDURE — 97012 MECHANICAL TRACTION THERAPY: CPT | Mod: GP | Performed by: PHYSICAL THERAPIST

## 2020-11-09 PROCEDURE — 97035 APP MDLTY 1+ULTRASOUND EA 15: CPT | Mod: GP | Performed by: PHYSICAL THERAPIST

## 2020-11-11 ENCOUNTER — THERAPY VISIT (OUTPATIENT)
Dept: PHYSICAL THERAPY | Facility: CLINIC | Age: 75
End: 2020-11-11
Payer: MEDICARE

## 2020-11-11 DIAGNOSIS — M54.12 CERVICAL RADICULOPATHY: ICD-10-CM

## 2020-11-11 PROCEDURE — 97140 MANUAL THERAPY 1/> REGIONS: CPT | Mod: GP | Performed by: PHYSICAL THERAPIST

## 2020-11-11 PROCEDURE — 97012 MECHANICAL TRACTION THERAPY: CPT | Mod: GP | Performed by: PHYSICAL THERAPIST

## 2020-11-11 PROCEDURE — 97035 APP MDLTY 1+ULTRASOUND EA 15: CPT | Mod: GP | Performed by: PHYSICAL THERAPIST

## 2020-11-11 NOTE — PROGRESS NOTES
Subjective:  HPI  Physical Exam                    Objective:  System    Physical Exam    General     ROS    Assessment/Plan:    DISCHARGE REPORT    Progress reporting period is from 10-10-20 to 11-11-20.       SUBJECTIVE  Subjective changes noted by patient:   Pt. has made good progress in PT the past few months with a steady decrease in overall neck pain and R UE radicular sx's. The pain and sx's are still present but with much less frequency and intensity. Pt. also demonstrates good cervical ROM. He has been able to increase his activity level at home but is not doing all his normal activity levels as of yet. He has a home ctx unit that he has been using daily at home that he feels helps. Pt. will leave for [place] for the winter on 11 1-6 and will continue treatment there as needed.      Current pain level is  2/10.     Previous pain level was  10/10.   Changes in function:  Yes (See Goal flowsheet attached for changes in current functional level)  Adverse reaction to treatment or activity: None    OBJECTIVE  Changes noted in objective findings:  ROM cervical ext 90%; R Rot WNL; L Rot WNL.      ASSESSMENT/PLAN  Updated problem list and treatment plan: Diagnosis 1:  R cervical radiculopathy  Pain -  US, mechanical traction, manual therapy, self management and education  Decreased ROM/flexibility - manual therapy and therapeutic exercise  Decreased strength - therapeutic exercise and therapeutic activities  Impaired muscle performance - neuro re-education  Decreased function - therapeutic activities  STG/LTGs have been met or progress has been made towards goals:  Yes (See Goal flow sheet completed today.)  Assessment of Progress: The patient's condition is improving.  Patient is meeting short term goals and is progressing towards long term goals.  Self Management Plans:  Patient is independent in a home treatment program.  Patient is independent in self management of symptoms.  I have re-evaluated this patient  and find that the nature, scope, duration and intensity of the therapy is appropriate for the medical condition of the patient.  Alonso continues to require the following intervention to meet STG and LTG's:  PT intervention is no longer required to meet STG/LTG.    Recommendations:  This patient is ready to be discharged from therapy and continue their home treatment program.    Please refer to the daily flowsheet for treatment today, total treatment time and time spent performing 1:1 timed codes.

## 2020-11-11 NOTE — LETTER
[facility]  [id number]  [place] [address]  [phone]    2020    Re: Alonso Churchill   :   1945  MRN:  [id number]   REFERRING PHYSICIAN:   Denis Olson    [facility]    Date of Initial Evaluation:  9-10-20  Visits:  Rxs Used: 19  Reason for Referral:  Cervical radiculopathy    DISCHARGE REPORT  Progress reporting period is from 10-10-20 to 20.       SUBJECTIVE  Subjective changes noted by patient:   Pt. has made good progress in PT the past few months with a steady decrease in overall neck pain and R UE radicular sx's. The pain and sx's are still present but with much less frequency and intensity. Pt. also demonstrates good cervical ROM. He has been able to increase his activity level at home but is not doing all his normal activity levels as of yet. He has a home ctx unit that he has been using daily at home that he feels helps. Pt. will leave for [place] for the winter on  and will continue treatment there as needed.      Current pain level is  2/10.     Previous pain level was  10/10.   Changes in function:  Yes (See Goal flowsheet attached for changes in current functional level)  Adverse reaction to treatment or activity: None    OBJECTIVE  Changes noted in objective findings:  ROM cervical ext 90%; R Rot WNL; L Rot WNL.      ASSESSMENT/PLAN  Updated problem list and treatment plan: Diagnosis 1:  R cervical radiculopathy  Pain -  US, mechanical traction, manual therapy, self management and education  Decreased ROM/flexibility - manual therapy and therapeutic exercise  Decreased strength - therapeutic exercise and therapeutic activities  Impaired muscle performance - neuro re-education  Decreased function - therapeutic activities  STG/LTGs have been met or progress has been made towards goals:  Yes (See Goal flow sheet completed today.)  Assessment of  Progress: The patient's condition is improving.  Patient is meeting short term goals and is progressing towards long term goals.  Self Management Plans:  Patient is independent in a home treatment program.  Patient is independent in self management of symptoms.  I have re-evaluated this patient and find that the nature, scope, duration and intensity of the therapy is appropriate for the medical condition of the patient.  Re: Alonso Churchill   :   1945    Alonso continues to require the following intervention to meet STG and LTG's:  PT intervention is no longer required to meet STG/LTG.    Recommendations:  This patient is ready to be discharged from therapy and continue their home treatment program.    Thank you for your referral.    INQUIRIES  Therapist: Tyra Thompson, PT   [facility]  [address] [place] [address] [id number]  [facility] [address]  Phone: [phone]  Fax: [phone]

## 2020-12-16 NOTE — ADDENDUM NOTE
Addended by: BERNADETTE CARVER on: 9/21/2019 07:56 PM     Modules accepted: Orders     Talked to Joshua Flores   per Dr. Hayes okay to give lovenox 100mg in the morning will endorse.

## 2021-02-01 ENCOUNTER — MYC MEDICAL ADVICE (OUTPATIENT)
Dept: UROLOGY | Facility: CLINIC | Age: 76
End: 2021-02-01

## 2021-04-12 ENCOUNTER — TELEPHONE (OUTPATIENT)
Dept: FAMILY MEDICINE | Facility: CLINIC | Age: 76
End: 2021-04-12

## 2021-04-12 NOTE — TELEPHONE ENCOUNTER
Patient Quality Outreach      Summary:    Patient has the following on his problem list/HM:     Hypertension   Last three blood pressure readings:  BP Readings from Last 3 Encounters:   10/02/20 (!) 147/70   09/22/20 136/82   09/08/20 (!) 168/77     Blood pressure: Failed    HTN Guidelines:  ? 139/89     Patient is due/failing the following:   BP check    Type of outreach:    Sent Chug message.    Questions for provider review:    None                                                                                                                                     Cecily Garcia

## 2021-05-05 DIAGNOSIS — Z80.42 FAMILY HX OF PROSTATE CANCER: ICD-10-CM

## 2021-05-05 DIAGNOSIS — I10 ESSENTIAL HYPERTENSION WITH GOAL BLOOD PRESSURE LESS THAN 140/90: ICD-10-CM

## 2021-05-05 DIAGNOSIS — E78.5 HYPERLIPIDEMIA LDL GOAL <130: ICD-10-CM

## 2021-05-05 DIAGNOSIS — R97.20 ELEVATED PROSTATE SPECIFIC ANTIGEN (PSA): ICD-10-CM

## 2021-05-05 LAB
ALBUMIN SERPL-MCNC: 3.7 G/DL (ref 3.4–5)
ALP SERPL-CCNC: 71 U/L (ref 40–150)
ALT SERPL W P-5'-P-CCNC: 28 U/L (ref 0–70)
ANION GAP SERPL CALCULATED.3IONS-SCNC: 4 MMOL/L (ref 3–14)
AST SERPL W P-5'-P-CCNC: 21 U/L (ref 0–45)
BASOPHILS # BLD AUTO: 0 10E9/L (ref 0–0.2)
BASOPHILS NFR BLD AUTO: 0.6 %
BILIRUB SERPL-MCNC: 0.6 MG/DL (ref 0.2–1.3)
BUN SERPL-MCNC: 16 MG/DL (ref 7–30)
CALCIUM SERPL-MCNC: 8.8 MG/DL (ref 8.5–10.1)
CHLORIDE SERPL-SCNC: 109 MMOL/L (ref 94–109)
CHOLEST SERPL-MCNC: 166 MG/DL
CO2 SERPL-SCNC: 28 MMOL/L (ref 20–32)
CREAT SERPL-MCNC: 0.92 MG/DL (ref 0.66–1.25)
CREAT UR-MCNC: 180 MG/DL
DIFFERENTIAL METHOD BLD: ABNORMAL
EOSINOPHIL # BLD AUTO: 0.3 10E9/L (ref 0–0.7)
EOSINOPHIL NFR BLD AUTO: 5.8 %
ERYTHROCYTE [DISTWIDTH] IN BLOOD BY AUTOMATED COUNT: 12.2 % (ref 10–15)
GFR SERPL CREATININE-BSD FRML MDRD: 80 ML/MIN/{1.73_M2}
GLUCOSE SERPL-MCNC: 104 MG/DL (ref 70–99)
HCT VFR BLD AUTO: 41.8 % (ref 40–53)
HDLC SERPL-MCNC: 78 MG/DL
HGB BLD-MCNC: 13.9 G/DL (ref 13.3–17.7)
LDLC SERPL CALC-MCNC: 75 MG/DL
LYMPHOCYTES # BLD AUTO: 2.1 10E9/L (ref 0.8–5.3)
LYMPHOCYTES NFR BLD AUTO: 39.5 %
MCH RBC QN AUTO: 32.7 PG (ref 26.5–33)
MCHC RBC AUTO-ENTMCNC: 33.3 G/DL (ref 31.5–36.5)
MCV RBC AUTO: 98 FL (ref 78–100)
MICROALBUMIN UR-MCNC: 15 MG/L
MICROALBUMIN/CREAT UR: 8.56 MG/G CR (ref 0–17)
MONOCYTES # BLD AUTO: 0.6 10E9/L (ref 0–1.3)
MONOCYTES NFR BLD AUTO: 11.1 %
NEUTROPHILS # BLD AUTO: 2.3 10E9/L (ref 1.6–8.3)
NEUTROPHILS NFR BLD AUTO: 43 %
NONHDLC SERPL-MCNC: 88 MG/DL
PLATELET # BLD AUTO: 255 10E9/L (ref 150–450)
POTASSIUM SERPL-SCNC: 4.3 MMOL/L (ref 3.4–5.3)
PROT SERPL-MCNC: 7.5 G/DL (ref 6.8–8.8)
RBC # BLD AUTO: 4.25 10E12/L (ref 4.4–5.9)
SODIUM SERPL-SCNC: 141 MMOL/L (ref 133–144)
TRIGL SERPL-MCNC: 64 MG/DL
WBC # BLD AUTO: 5.4 10E9/L (ref 4–11)

## 2021-05-05 PROCEDURE — 85025 COMPLETE CBC W/AUTO DIFF WBC: CPT | Performed by: INTERNAL MEDICINE

## 2021-05-05 PROCEDURE — 99000 SPECIMEN HANDLING OFFICE-LAB: CPT | Performed by: UROLOGY

## 2021-05-05 PROCEDURE — 80053 COMPREHEN METABOLIC PANEL: CPT | Performed by: INTERNAL MEDICINE

## 2021-05-05 PROCEDURE — 82043 UR ALBUMIN QUANTITATIVE: CPT | Performed by: INTERNAL MEDICINE

## 2021-05-05 PROCEDURE — 84153 ASSAY OF PSA TOTAL: CPT | Mod: 90 | Performed by: UROLOGY

## 2021-05-05 PROCEDURE — 84154 ASSAY OF PSA FREE: CPT | Mod: 90 | Performed by: UROLOGY

## 2021-05-05 PROCEDURE — 80061 LIPID PANEL: CPT | Performed by: INTERNAL MEDICINE

## 2021-05-05 PROCEDURE — 36415 COLL VENOUS BLD VENIPUNCTURE: CPT | Performed by: UROLOGY

## 2021-05-06 LAB
PSA FREE MFR SERPL: 30 %
PSA FREE SERPL-MCNC: 1 NG/ML
PSA SERPL-MCNC: 3.3 NG/ML (ref 0–4)

## 2021-05-10 NOTE — RESULT ENCOUNTER NOTE
Dear Matt,     Here are your recent results.     PSA is recheck normal.  You could consider a repeat PSA check in 1 year, although it is reasonable to discontinue PSA checks at your age.     Please let us know if you have any questions or concerns.    Thank You    Andres WOLFF

## 2021-09-20 ASSESSMENT — ENCOUNTER SYMPTOMS
ABDOMINAL PAIN: 0
MYALGIAS: 1
ARTHRALGIAS: 1
CONSTIPATION: 0
NAUSEA: 0
HEADACHES: 0
WEAKNESS: 0
DIZZINESS: 0
FEVER: 0
DIARRHEA: 0
CHILLS: 0
DYSURIA: 0
HEARTBURN: 0
SORE THROAT: 0
EYE PAIN: 0
JOINT SWELLING: 1
PARESTHESIAS: 0
HEMATURIA: 0
HEMATOCHEZIA: 1
PALPITATIONS: 0
FREQUENCY: 0
NERVOUS/ANXIOUS: 0
SHORTNESS OF BREATH: 0
COUGH: 1

## 2021-09-20 ASSESSMENT — ACTIVITIES OF DAILY LIVING (ADL): CURRENT_FUNCTION: NO ASSISTANCE NEEDED

## 2021-09-21 ASSESSMENT — ENCOUNTER SYMPTOMS
ABDOMINAL PAIN: 0
CHILLS: 0
SHORTNESS OF BREATH: 0
PALPITATIONS: 0
NERVOUS/ANXIOUS: 0
HEADACHES: 0
HEMATOCHEZIA: 1
SORE THROAT: 0
FEVER: 0
FREQUENCY: 0
NAUSEA: 0
DIZZINESS: 0
COUGH: 1
DIARRHEA: 0
MYALGIAS: 1
ARTHRALGIAS: 1
JOINT SWELLING: 1
CONSTIPATION: 0
WEAKNESS: 0
HEMATURIA: 0
DYSURIA: 0
HEARTBURN: 0
PARESTHESIAS: 0
EYE PAIN: 0

## 2021-09-21 ASSESSMENT — ACTIVITIES OF DAILY LIVING (ADL): CURRENT_FUNCTION: NO ASSISTANCE NEEDED

## 2021-09-21 NOTE — PROGRESS NOTES
"SUBJECTIVE:   Alonso Churchill is a 76 year old male who presents for Preventive Visit.      Patient has been advised of split billing requirements and indicates understanding: Yes   Are you in the first 12 months of your Medicare coverage?  No    Healthy Habits:     In general, how would you rate your overall health?  Good    Frequency of exercise:  4-5 days/week    Duration of exercise:  30-45 minutes    Do you usually eat at least 4 servings of fruit and vegetables a day, include whole grains    & fiber and avoid regularly eating high fat or \"junk\" foods?  No    Ability to successfully perform activities of daily living:  No assistance needed    Home Safety:  No safety concerns identified    Hearing Impairment:  Difficulty following a conversation in a noisy restaurant or crowded room, feel that people are mumbling or not speaking clearly, find that men's voices are easier to understand than woman's, difficulty understanding soft or whispered speech and difficulty understanding speech on the telephone    In the past 6 months, have you been bothered by leaking of urine?  No    In general, how would you rate your overall mental or emotional health?  Good      PHQ-2 Total Score: 0    Additional concerns today:  No    Do you feel safe in your environment? Yes    Have you ever done Advance Care Planning? (For example, a Health Directive, POLST, or a discussion with a medical provider or your loved ones about your wishes): No, advance care planning information given to patient to review.  Advanced care planning was discussed at today's visit.      Fall risk  Fallen 2 or more times in the past year?: No  Any fall with injury in the past year?: No  click delete button to remove this line now  Cognitive Screening   1) Repeat 3 items (Leader, Season, Table)    2) Clock draw: NORMAL  3) 3 item recall: Recalls 3 objects  Results: 3 items recalled: COGNITIVE IMPAIRMENT LESS LIKELY    Mini-CogTM Copyright NICKI Daniel. Licensed by " the author for use in [facility]; reprinted with permission (fabiochay@[facility]). All rights reserved.      Do you have sleep apnea, excessive snoring or daytime drowsiness?: no    Reviewed and updated as needed this visit by clinical staff  Tobacco  Allergies  Meds              Reviewed and updated as needed this visit by Provider                Social History     Tobacco Use     Smoking status: Former Smoker     Packs/day: 0.50     Years: 10.00     Pack years: 5.00     Types: Cigarettes     Smokeless tobacco: Never Used     Tobacco comment: 35 years    Substance Use Topics     Alcohol use: Yes     Alcohol/week: 0.0 standard drinks     Comment: glass of wine per day or less     If you drink alcohol do you typically have >3 drinks per day or >7 drinks per week? No    Alcohol Use 9/20/2021   Prescreen: >3 drinks/day or >7 drinks/week? No   Prescreen: >3 drinks/day or >7 drinks/week? -           Current providers sharing in care for this patient include:  Patient Care Team:  Yvonne Granado MD as PCP - General (Family Practice)  Oumar Luke MD as Assigned PCP  Diomedes Renee MD as Assigned Neuroscience Provider  Ignacio Fragoso MD as Assigned Surgical Provider    The following health maintenance items are reviewed in Epic and correct as of today:  Health Maintenance Due   Topic Date Due     COVID-19 Vaccine (1) Never done     HEPATITIS C SCREENING  Never done     INFLUENZA VACCINE (1) 09/01/2021     Lab work is in process          Review of Systems   Constitutional: Negative for chills and fever.   HENT: Positive for hearing loss. Negative for congestion, ear pain and sore throat.    Eyes: Negative for pain and visual disturbance.   Respiratory: Positive for cough. Negative for shortness of breath.    Cardiovascular: Negative for chest pain, palpitations and peripheral edema.   Gastrointestinal: Positive for hematochezia. Negative for abdominal pain, constipation, diarrhea, heartburn  "and nausea.   Genitourinary: Positive for impotence. Negative for discharge, dysuria, frequency, genital sores, hematuria and urgency.   Musculoskeletal: Positive for arthralgias, joint swelling and myalgias.   Skin: Negative for rash.   Neurological: Negative for dizziness, weakness, headaches and paresthesias.   Psychiatric/Behavioral: Negative for mood changes. The patient is not nervous/anxious.          OBJECTIVE:   BP (!) 161/77 (BP Location: Left arm, Patient Position: Sitting, Cuff Size: Adult Regular)   Pulse 64   Temp 97.9  F (36.6  C) (Tympanic)   Ht 1.676 m (5' 6\")   Wt 68.8 kg (151 lb 9.6 oz)   SpO2 99%   BMI 24.47 kg/m   Estimated body mass index is 24.47 kg/m  as calculated from the following:    Height as of this encounter: 1.676 m (5' 6\").    Weight as of this encounter: 68.8 kg (151 lb 9.6 oz).  Physical Exam  GENERAL: healthy, alert and no distress  EYES: Eyes grossly normal to inspection, PERRL and conjunctivae and sclerae normal  HENT: ear canals and TM's normal, nose and mouth without ulcers or lesions  NECK: no adenopathy, no asymmetry, masses, or scars and thyroid normal to palpation  RESP: lungs clear to auscultation - no rales, rhonchi or wheezes  CV: regular rate and rhythm, normal S1 S2, no S3 or S4, no murmur, click or rub, no peripheral edema and peripheral pulses strong  ABDOMEN: soft, nontender, no hepatosplenomegaly, no masses and bowel sounds normal  MS: no gross musculoskeletal defects noted, no edema  SKIN: Muliple seborrheic keratosis  NEURO: Normal strength and tone, mentation intact and speech normal  PSYCH: mentation appears normal, affect normal/bright        ASSESSMENT / PLAN:       ICD-10-CM    1. Encounter for Medicare annual wellness exam  Z00.00 CBC with platelets and differential     Comprehensive metabolic panel (BMP + Alb, Alk Phos, ALT, AST, Total. Bili, TP)   2. Hyperlipidemia LDL goal <130  E78.5 simvastatin (ZOCOR) 20 MG tablet     Lipid panel reflex to " "direct LDL Fasting   3. Essential hypertension with goal blood pressure less than 140/90  I10 losartan (COZAAR) 100 MG tablet   4. Screening for prostate cancer  Z12.5 PSA, screen     He is in good physical health  He has been vaccinated against Covid with more moderna in [place]  He does have hematochezia at times  He tells me this is from his hemorrhoids  No loss of weight  Had a colonoscopy in 2018 and he is due for 1 more in 2023  He tells me this bleeding is like his hemorrhoidal bleeding  Blood pressure has been elevated  I reviewed the home recordings  Persistently above 140  Sometimes they are even above 160  We will increase the losartan to 100 mg  Told him to watch for any side effects  He now has a little dry cough on and off which could be from losartan but this is not bothersome  The coughing gets worse then we might have to change to a different medication  I told him to let me know if he does any dizziness and if the blood results were 100 systolic then will have to cut back on the dose  I want the readings persistently above 1/21/130 systolic and below 90 diastolic  He has extensive history of basal cell carcinoma  He does have multiple seborrheic keratosis on the back  He is going to see a dermatologist in [place]  Patient has been advised of split billing requirements and indicates understanding: Yes  COUNSELING:  Reviewed preventive health counseling, as reflected in patient instructions       Regular exercise       Healthy diet/nutrition       Vision screening       Hearing screening       Dental care       Immunizations    Vaccinated for: Influenza             Colon cancer screening       Prostate cancer screening    Estimated body mass index is 24.47 kg/m  as calculated from the following:    Height as of this encounter: 1.676 m (5' 6\").    Weight as of this encounter: 68.8 kg (151 lb 9.6 oz).        He reports that he has quit smoking. His smoking use included cigarettes. He has a 5.00 " pack-year smoking history. He has never used smokeless tobacco.      Appropriate preventive services were discussed with this patient, including applicable screening as appropriate for cardiovascular disease, diabetes, osteopenia/osteoporosis, and glaucoma.  As appropriate for age/gender, discussed screening for colorectal cancer, prostate cancer, breast cancer, and cervical cancer. Checklist reviewing preventive services available has been given to the patient.    Reviewed patients plan of care and provided an AVS. The Basic Care Plan (routine screening as documented in Health Maintenance) for Alonso meets the Care Plan requirement. This Care Plan has been established and reviewed with the Patient.    Counseling Resources:  ATP IV Guidelines  Pooled Cohorts Equation Calculator  Breast Cancer Risk Calculator  Breast Cancer: Medication to Reduce Risk  FRAX Risk Assessment  ICSI Preventive Guidelines  Dietary Guidelines for Americans, 2010  USDA's MyPlate  ASA Prophylaxis  Lung CA Screening    Oumar Luke MD  [facility]    Identified Health Risks:    The patient was counseled and encouraged to consider modifying their diet and eating habits. He was provided with information on recommended healthy diet options.  The patient was provided with written information regarding signs of hearing loss.

## 2021-09-21 NOTE — PATIENT INSTRUCTIONS
Patient Education   Personalized Prevention Plan  You are due for the preventive services outlined below.  Your care team is available to assist you in scheduling these services.  If you have already completed any of these items, please share that information with your care team to update in your medical record.  Health Maintenance Due   Topic Date Due     ANNUAL REVIEW OF HM ORDERS  Never done     COVID-19 Vaccine (1) Never done     Hepatitis C Screening  Never done     Flu Vaccine (1) 09/01/2021     Annual Wellness Visit  09/22/2021     FALL RISK ASSESSMENT  09/22/2021        Patient Education   Personalized Prevention Plan  You are due for the preventive services outlined below.  Your care team is available to assist you in scheduling these services.  If you have already completed any of these items, please share that information with your care team to update in your medical record.  Health Maintenance Due   Topic Date Due     COVID-19 Vaccine (1) Never done     Hepatitis C Screening  Never done     Flu Vaccine (1) 09/01/2021       Understanding USDA MyPlate  The USDA has guidelines to help you make healthy food choices. These are called MyPlate. MyPlate shows the food groups that make up healthy meals using the image of a place setting. Before you eat, think about the healthiest choices for what to put on your plate or in your cup or bowl. To learn more about building a healthy plate, visit www.choosemyplate.gov.    The food groups    Fruits. Any fruit or 100% fruit juice counts as part of the Fruit Group. Fruits may be fresh, canned, frozen, or dried, and may be whole, cut-up, or pureed. Make 1/2 of your plate fruits and vegetables.    Vegetables. Any vegetable or 100% vegetable juice counts as a member of the Vegetable Group. Vegetables may be fresh, frozen, canned, or dried. They can be served raw or cooked and may be whole, cut-up, or mashed. Make 1/2 of your plate fruits and vegetables.    Grains. All  foods made from grains are part of the Grains Group. These include wheat, rice, oats, cornmeal, and barley. Grains are often used to make foods such as bread, pasta, oatmeal, cereal, tortillas, and grits. Grains should be no more than 1/4 of your plate. At least half of your grains should be whole grains.    Protein. This group includes meat, poultry, seafood, beans and peas, eggs, processed soy products (such as tofu), nuts (including nut butters), and seeds. Make protein choices no more than 1/4 of your plate. Meat and poultry choices should be lean or low fat.    Dairy. The Dairy Group includes all fluid milk products and foods made from milk that contain calcium, such as yogurt and cheese. (Foods that have little calcium, such as cream, butter, and cream cheese, are not part of this group.) Most dairy choices should be low-fat or fat-free.    Oils. Oils aren't a food group, but they do contain essential nutrients. However it's important to watch your intake of oils. These are fats that are liquid at room temperature. They include canola, corn, olive, soybean, vegetable, and sunflower oil. Foods that are mainly oil include mayonnaise, certain salad dressings, and soft margarines. You likely already get your daily oil allowance from the foods you eat.  Things to limit  Eating healthy also means limiting these things in your diet:       Salt (sodium). Many processed foods have a lot of sodium. To keep sodium intake down, eat fresh vegetables, meats, poultry, and seafood when possible. Purchase low-sodium, reduced-sodium, or no-salt-added food products at the store. And don't add salt to your meals at home. Instead, season them with herbs and spices such as dill, oregano, cumin, and paprika. Or try adding flavor with lemon or lime zest and juice.    Saturated fat. Saturated fats are most often found in animal products such as beef, pork, and chicken. They are often solid at room temperature, such as butter. To  reduce your saturated fat intake, choose leaner cuts of meat and poultry. And try healthier cooking methods such as grilling, broiling, roasting, or baking. For a simple lower-fat swap, use plain nonfat yogurt instead of mayonnaise when making potato salad or macaroni salad.    Added sugars. These are sugars added to foods. They are in foods such as ice cream, candy, soda, fruit drinks, sports drinks, energy drinks, cookies, pastries, jams, and syrups. Cut down on added sugars by sharing sweet treats with a family member or friend. You can also choose fruit for dessert, and drink water or other unsweetened beverages.     StayWell last reviewed this educational content on 6/1/2020 2000-2021 [employer]. All rights reserved. This information is not intended as a substitute for professional medical care. Always follow your healthcare professional's instructions.          Signs of Hearing Loss      Hearing much better with one ear can be a sign of hearing loss.   Hearing loss is a problem shared by many people. In fact, it is one of the most common health problems, particularly as people age. Most people age 65 and older have some hearing loss. By age 80, almost everyone does. Hearing loss often occurs slowly over the years. So you may not realize your hearing has gotten worse.  Have your hearing checked  Call your healthcare provider if you:    Have to strain to hear normal conversation    Have to watch other people s faces very carefully to follow what they re saying    Need to ask people to repeat what they ve said    Often misunderstand what people are saying    Turn the volume of the television or radio up so high that others complain    Feel that people are mumbling when they re talking to you    Find that the effort to hear leaves you feeling tired and irritated    Notice, when using the phone, that you hear better with one ear than the other  Denis last reviewed this educational content on  1/1/2020 2000-2021 [employer]. All rights reserved. This information is not intended as a substitute for professional medical care. Always follow your healthcare professional's instructions.

## 2021-09-23 ENCOUNTER — OFFICE VISIT (OUTPATIENT)
Dept: FAMILY MEDICINE | Facility: CLINIC | Age: 76
End: 2021-09-23
Payer: MEDICARE

## 2021-09-23 VITALS
TEMPERATURE: 97.9 F | HEART RATE: 64 BPM | OXYGEN SATURATION: 99 % | HEIGHT: 66 IN | WEIGHT: 151.6 LBS | BODY MASS INDEX: 24.36 KG/M2 | DIASTOLIC BLOOD PRESSURE: 77 MMHG | SYSTOLIC BLOOD PRESSURE: 161 MMHG

## 2021-09-23 DIAGNOSIS — Z12.5 SCREENING FOR PROSTATE CANCER: ICD-10-CM

## 2021-09-23 DIAGNOSIS — I10 ESSENTIAL HYPERTENSION WITH GOAL BLOOD PRESSURE LESS THAN 140/90: ICD-10-CM

## 2021-09-23 DIAGNOSIS — E78.5 HYPERLIPIDEMIA LDL GOAL <130: ICD-10-CM

## 2021-09-23 DIAGNOSIS — Z00.00 ENCOUNTER FOR MEDICARE ANNUAL WELLNESS EXAM: Primary | ICD-10-CM

## 2021-09-23 DIAGNOSIS — Z23 ENCOUNTER FOR IMMUNIZATION: ICD-10-CM

## 2021-09-23 LAB
ALBUMIN SERPL-MCNC: 3.8 G/DL (ref 3.4–5)
ALP SERPL-CCNC: 71 U/L (ref 40–150)
ALT SERPL W P-5'-P-CCNC: 24 U/L (ref 0–70)
ANION GAP SERPL CALCULATED.3IONS-SCNC: 4 MMOL/L (ref 3–14)
AST SERPL W P-5'-P-CCNC: 13 U/L (ref 0–45)
BASOPHILS # BLD AUTO: 0 10E3/UL (ref 0–0.2)
BASOPHILS NFR BLD AUTO: 1 %
BILIRUB SERPL-MCNC: 0.7 MG/DL (ref 0.2–1.3)
BUN SERPL-MCNC: 17 MG/DL (ref 7–30)
CALCIUM SERPL-MCNC: 8.8 MG/DL (ref 8.5–10.1)
CHLORIDE BLD-SCNC: 109 MMOL/L (ref 94–109)
CHOLEST SERPL-MCNC: 173 MG/DL
CO2 SERPL-SCNC: 28 MMOL/L (ref 20–32)
CREAT SERPL-MCNC: 0.88 MG/DL (ref 0.66–1.25)
CREAT UR-MCNC: 113 MG/DL
EOSINOPHIL # BLD AUTO: 0.4 10E3/UL (ref 0–0.7)
EOSINOPHIL NFR BLD AUTO: 6 %
ERYTHROCYTE [DISTWIDTH] IN BLOOD BY AUTOMATED COUNT: 12.3 % (ref 10–15)
FASTING STATUS PATIENT QL REPORTED: YES
GFR SERPL CREATININE-BSD FRML MDRD: 83 ML/MIN/1.73M2
GLUCOSE BLD-MCNC: 103 MG/DL (ref 70–99)
HCT VFR BLD AUTO: 41.1 % (ref 40–53)
HDLC SERPL-MCNC: 82 MG/DL
HGB BLD-MCNC: 13.5 G/DL (ref 13.3–17.7)
IMM GRANULOCYTES # BLD: 0 10E3/UL
IMM GRANULOCYTES NFR BLD: 0 %
LDLC SERPL CALC-MCNC: 79 MG/DL
LYMPHOCYTES # BLD AUTO: 2.5 10E3/UL (ref 0.8–5.3)
LYMPHOCYTES NFR BLD AUTO: 40 %
MCH RBC QN AUTO: 32.6 PG (ref 26.5–33)
MCHC RBC AUTO-ENTMCNC: 32.8 G/DL (ref 31.5–36.5)
MCV RBC AUTO: 99 FL (ref 78–100)
MICROALBUMIN UR-MCNC: 7 MG/L
MICROALBUMIN/CREAT UR: 6.19 MG/G CR (ref 0–17)
MONOCYTES # BLD AUTO: 0.6 10E3/UL (ref 0–1.3)
MONOCYTES NFR BLD AUTO: 10 %
NEUTROPHILS # BLD AUTO: 2.6 10E3/UL (ref 1.6–8.3)
NEUTROPHILS NFR BLD AUTO: 43 %
NONHDLC SERPL-MCNC: 91 MG/DL
PLATELET # BLD AUTO: 252 10E3/UL (ref 150–450)
POTASSIUM BLD-SCNC: 4.2 MMOL/L (ref 3.4–5.3)
PROT SERPL-MCNC: 7.1 G/DL (ref 6.8–8.8)
PSA SERPL-MCNC: 3.52 UG/L (ref 0–4)
RBC # BLD AUTO: 4.14 10E6/UL (ref 4.4–5.9)
SODIUM SERPL-SCNC: 141 MMOL/L (ref 133–144)
TRIGL SERPL-MCNC: 60 MG/DL
WBC # BLD AUTO: 6.1 10E3/UL (ref 4–11)

## 2021-09-23 PROCEDURE — 90662 IIV NO PRSV INCREASED AG IM: CPT | Performed by: INTERNAL MEDICINE

## 2021-09-23 PROCEDURE — 80053 COMPREHEN METABOLIC PANEL: CPT | Performed by: INTERNAL MEDICINE

## 2021-09-23 PROCEDURE — 36415 COLL VENOUS BLD VENIPUNCTURE: CPT | Performed by: INTERNAL MEDICINE

## 2021-09-23 PROCEDURE — 85025 COMPLETE CBC W/AUTO DIFF WBC: CPT | Performed by: INTERNAL MEDICINE

## 2021-09-23 PROCEDURE — 99213 OFFICE O/P EST LOW 20 MIN: CPT | Mod: 25 | Performed by: INTERNAL MEDICINE

## 2021-09-23 PROCEDURE — G0103 PSA SCREENING: HCPCS | Performed by: INTERNAL MEDICINE

## 2021-09-23 PROCEDURE — 82043 UR ALBUMIN QUANTITATIVE: CPT | Performed by: INTERNAL MEDICINE

## 2021-09-23 PROCEDURE — 80061 LIPID PANEL: CPT | Performed by: INTERNAL MEDICINE

## 2021-09-23 PROCEDURE — G0439 PPPS, SUBSEQ VISIT: HCPCS | Performed by: INTERNAL MEDICINE

## 2021-09-23 PROCEDURE — G0008 ADMIN INFLUENZA VIRUS VAC: HCPCS | Performed by: INTERNAL MEDICINE

## 2021-09-23 RX ORDER — LOSARTAN POTASSIUM 100 MG/1
100 TABLET ORAL DAILY
Qty: 90 TABLET | Refills: 3 | Status: SHIPPED | OUTPATIENT
Start: 2021-09-23 | End: 2022-02-09

## 2021-09-23 RX ORDER — SIMVASTATIN 20 MG
20 TABLET ORAL AT BEDTIME
Qty: 90 TABLET | Refills: 3 | Status: SHIPPED | OUTPATIENT
Start: 2021-09-23 | End: 2022-02-03

## 2021-09-23 ASSESSMENT — PAIN SCALES - GENERAL: PAINLEVEL: NO PAIN (0)

## 2021-09-23 ASSESSMENT — MIFFLIN-ST. JEOR: SCORE: 1355.4

## 2021-11-15 PROBLEM — M54.12 CERVICAL RADICULOPATHY: Status: RESOLVED | Noted: 2020-09-10 | Resolved: 2021-11-15

## 2022-02-09 ENCOUNTER — DOCUMENTATION ONLY (OUTPATIENT)
Dept: FAMILY MEDICINE | Facility: CLINIC | Age: 77
End: 2022-02-09

## 2022-02-09 ENCOUNTER — TELEPHONE (OUTPATIENT)
Dept: FAMILY MEDICINE | Facility: CLINIC | Age: 77
End: 2022-02-09
Payer: COMMERCIAL

## 2022-02-09 NOTE — PROGRESS NOTES
My license is not inactive  My license number is [id number]  My last name is DOV  My first name is FAZAL  They can check on the [place] medical board website  This is a problem that you keep having with [facility] only and with no other pharmacies  Please call this number and give them my license details as above  [facility] [address] [phone] - [phone]

## 2022-02-09 NOTE — TELEPHONE ENCOUNTER
I sent electronic prescription so there cannot be any missing or illegible information  This is an issue I been having with [facility] only  They keep on saying that my license is an active  You can call them and get my license number [id number]  They can check this number online as well

## 2022-02-09 NOTE — TELEPHONE ENCOUNTER
Routing to provider to review and advise.    For losartan and simvastatin prescriptions sent today.      Melanie Diaz RN  [facility]

## 2022-02-09 NOTE — TELEPHONE ENCOUNTER
Called pharmacy and left a voicemail with providers licence and JULIA numbers. Gave clinic number to call back with questions.      Melanie Diaz RN  [facility]

## 2022-02-09 NOTE — TELEPHONE ENCOUNTER
Missing/illegible information on Rx. Further clarification needed. prescriber license number listed as inactive. Please provide current license number or switch to prescriber with active license.              [facility]

## 2022-02-09 NOTE — TELEPHONE ENCOUNTER
Writer spoke to [facility] in [place] And gave Dr Rod provider information. They had a different license number listed. This may correct the issue.

## 2022-09-17 ASSESSMENT — ENCOUNTER SYMPTOMS
SHORTNESS OF BREATH: 1
MYALGIAS: 1
HEADACHES: 0
FEVER: 0
DIZZINESS: 1
SORE THROAT: 0
ABDOMINAL PAIN: 0
CONSTIPATION: 0
FREQUENCY: 0
CHILLS: 0
COUGH: 1
DYSURIA: 0
HEMATURIA: 0
PARESTHESIAS: 1
HEMATOCHEZIA: 1
NERVOUS/ANXIOUS: 1
DIARRHEA: 0
JOINT SWELLING: 0
PALPITATIONS: 0
NAUSEA: 0
HEARTBURN: 0
EYE PAIN: 0
ARTHRALGIAS: 1
WEAKNESS: 0

## 2022-09-17 ASSESSMENT — ACTIVITIES OF DAILY LIVING (ADL): CURRENT_FUNCTION: NO ASSISTANCE NEEDED

## 2022-09-23 ENCOUNTER — OFFICE VISIT (OUTPATIENT)
Dept: FAMILY MEDICINE | Facility: CLINIC | Age: 77
End: 2022-09-23
Payer: COMMERCIAL

## 2022-09-23 VITALS
HEART RATE: 67 BPM | BODY MASS INDEX: 23.2 KG/M2 | RESPIRATION RATE: 16 BRPM | WEIGHT: 147.8 LBS | DIASTOLIC BLOOD PRESSURE: 72 MMHG | OXYGEN SATURATION: 100 % | HEIGHT: 67 IN | TEMPERATURE: 98.2 F | SYSTOLIC BLOOD PRESSURE: 138 MMHG

## 2022-09-23 DIAGNOSIS — I10 ESSENTIAL HYPERTENSION WITH GOAL BLOOD PRESSURE LESS THAN 140/90: ICD-10-CM

## 2022-09-23 DIAGNOSIS — Z12.5 SCREENING FOR PROSTATE CANCER: ICD-10-CM

## 2022-09-23 DIAGNOSIS — Z23 NEED FOR PROPHYLACTIC VACCINATION AND INOCULATION AGAINST INFLUENZA: ICD-10-CM

## 2022-09-23 DIAGNOSIS — E78.5 HYPERLIPIDEMIA LDL GOAL <130: Primary | ICD-10-CM

## 2022-09-23 DIAGNOSIS — I95.1 ORTHOSTATIC HYPOTENSION: ICD-10-CM

## 2022-09-23 DIAGNOSIS — J30.2 SEASONAL ALLERGIC RHINITIS, UNSPECIFIED TRIGGER: ICD-10-CM

## 2022-09-23 DIAGNOSIS — Z00.00 ENCOUNTER FOR MEDICARE ANNUAL WELLNESS EXAM: ICD-10-CM

## 2022-09-23 DIAGNOSIS — N52.9 ERECTILE DYSFUNCTION, UNSPECIFIED ERECTILE DYSFUNCTION TYPE: ICD-10-CM

## 2022-09-23 LAB
ALBUMIN SERPL-MCNC: 3.7 G/DL (ref 3.4–5)
ALP SERPL-CCNC: 69 U/L (ref 40–150)
ALT SERPL W P-5'-P-CCNC: 20 U/L (ref 0–70)
ANION GAP SERPL CALCULATED.3IONS-SCNC: 5 MMOL/L (ref 3–14)
AST SERPL W P-5'-P-CCNC: 17 U/L (ref 0–45)
BILIRUB SERPL-MCNC: 0.9 MG/DL (ref 0.2–1.3)
BUN SERPL-MCNC: 14 MG/DL (ref 7–30)
CALCIUM SERPL-MCNC: 9.4 MG/DL (ref 8.5–10.1)
CHLORIDE BLD-SCNC: 108 MMOL/L (ref 94–109)
CHOLEST SERPL-MCNC: 154 MG/DL
CO2 SERPL-SCNC: 27 MMOL/L (ref 20–32)
CREAT SERPL-MCNC: 0.86 MG/DL (ref 0.66–1.25)
FASTING STATUS PATIENT QL REPORTED: YES
GFR SERPL CREATININE-BSD FRML MDRD: 89 ML/MIN/1.73M2
GLUCOSE BLD-MCNC: 103 MG/DL (ref 70–99)
HDLC SERPL-MCNC: 82 MG/DL
LDLC SERPL CALC-MCNC: 63 MG/DL
NONHDLC SERPL-MCNC: 72 MG/DL
POTASSIUM BLD-SCNC: 4.4 MMOL/L (ref 3.4–5.3)
PROT SERPL-MCNC: 7.5 G/DL (ref 6.8–8.8)
PSA SERPL-MCNC: 3.64 UG/L (ref 0–4)
SODIUM SERPL-SCNC: 140 MMOL/L (ref 133–144)
TRIGL SERPL-MCNC: 45 MG/DL

## 2022-09-23 PROCEDURE — 90662 IIV NO PRSV INCREASED AG IM: CPT | Performed by: INTERNAL MEDICINE

## 2022-09-23 PROCEDURE — 80061 LIPID PANEL: CPT | Performed by: INTERNAL MEDICINE

## 2022-09-23 PROCEDURE — 99214 OFFICE O/P EST MOD 30 MIN: CPT | Mod: 25 | Performed by: INTERNAL MEDICINE

## 2022-09-23 PROCEDURE — 36415 COLL VENOUS BLD VENIPUNCTURE: CPT | Performed by: INTERNAL MEDICINE

## 2022-09-23 PROCEDURE — G0103 PSA SCREENING: HCPCS | Performed by: INTERNAL MEDICINE

## 2022-09-23 PROCEDURE — G0439 PPPS, SUBSEQ VISIT: HCPCS | Performed by: INTERNAL MEDICINE

## 2022-09-23 PROCEDURE — G0008 ADMIN INFLUENZA VIRUS VAC: HCPCS | Performed by: INTERNAL MEDICINE

## 2022-09-23 PROCEDURE — 80053 COMPREHEN METABOLIC PANEL: CPT | Performed by: INTERNAL MEDICINE

## 2022-09-23 RX ORDER — LOSARTAN POTASSIUM 50 MG/1
50 TABLET ORAL DAILY
Qty: 90 TABLET | Refills: 1 | Status: SHIPPED | OUTPATIENT
Start: 2022-09-23 | End: 2023-09-22

## 2022-09-23 RX ORDER — SIMVASTATIN 20 MG
20 TABLET ORAL AT BEDTIME
Qty: 90 TABLET | Refills: 1 | Status: SHIPPED | OUTPATIENT
Start: 2022-09-23 | End: 2023-02-13

## 2022-09-23 RX ORDER — SILDENAFIL 50 MG/1
TABLET, FILM COATED ORAL
Qty: 30 TABLET | Refills: 0 | Status: SHIPPED | OUTPATIENT
Start: 2022-09-23 | End: 2024-05-31

## 2022-09-23 RX ORDER — MOMETASONE FUROATE MONOHYDRATE 50 UG/1
2 SPRAY, METERED NASAL DAILY
Qty: 17 G | Refills: 1 | Status: SHIPPED | OUTPATIENT
Start: 2022-09-23 | End: 2022-10-18

## 2022-09-23 RX ORDER — FEXOFENADINE HCL 180 MG/1
180 TABLET ORAL DAILY
Qty: 90 TABLET | Refills: 1 | Status: SHIPPED | OUTPATIENT
Start: 2022-09-23 | End: 2023-09-22

## 2022-09-23 ASSESSMENT — ENCOUNTER SYMPTOMS
FREQUENCY: 0
DYSURIA: 0
MYALGIAS: 1
NERVOUS/ANXIOUS: 1
ARTHRALGIAS: 1
WEAKNESS: 0
SORE THROAT: 0
JOINT SWELLING: 0
SHORTNESS OF BREATH: 1
HEARTBURN: 0
CONSTIPATION: 0
PARESTHESIAS: 1
HEMATOCHEZIA: 1
HEMATURIA: 0
PALPITATIONS: 0
FEVER: 0
COUGH: 1
DIARRHEA: 0
HEADACHES: 0
NAUSEA: 0
CHILLS: 0
EYE PAIN: 0
ABDOMINAL PAIN: 0
DIZZINESS: 1

## 2022-09-23 ASSESSMENT — PAIN SCALES - GENERAL: PAINLEVEL: NO PAIN (0)

## 2022-09-23 ASSESSMENT — ACTIVITIES OF DAILY LIVING (ADL): CURRENT_FUNCTION: NO ASSISTANCE NEEDED

## 2022-09-23 NOTE — PROGRESS NOTES
"SUBJECTIVE:   Matt is a 77 year old who presents for Preventive Visit.      Patient has been advised of split billing requirements and indicates understanding: Yes  Are you in the first 12 months of your Medicare coverage?  No    Healthy Habits:     In general, how would you rate your overall health?  Fair    Frequency of exercise:  2-3 days/week    Duration of exercise:  N/A    Do you usually eat at least 4 servings of fruit and vegetables a day, include whole grains    & fiber and avoid regularly eating high fat or \"junk\" foods?  No    Taking medications regularly:  Yes    Medication side effects:  Muscle aches and Lightheadedness    Ability to successfully perform activities of daily living:  No assistance needed    Home Safety:  No safety concerns identified    Hearing Impairment:  Difficulty following a conversation in a noisy restaurant or crowded room, feel that people are mumbling or not speaking clearly, difficult to understand a speaker at a public meeting or [employer] service, need to ask people to speak up or repeat themselves, find that men's voices are easier to understand than woman's, difficulty understanding soft or whispered speech and difficulty understanding speech on the telephone    In the past 6 months, have you been bothered by leaking of urine?  No    In general, how would you rate your overall mental or emotional health?  Good      PHQ-2 Total Score: 0    Additional concerns today:  Yes    Do you feel safe in your environment? Yes    Have you ever done Advance Care Planning? (For example, a Health Directive, POLST, or a discussion with a medical provider or your loved ones about your wishes): No, advance care planning information given to patient to review.  Patient declined advance care planning discussion at this time.       Fall risk  Fallen 2 or more times in the past year?: No  Any fall with injury in the past year?: No    Cognitive Screening   1) Repeat 3 items (Leader, Season, Table) "    2) Clock draw: NORMAL  3) 3 item recall: Recalls 3 objects  Results: 3 items recalled: COGNITIVE IMPAIRMENT LESS LIKELY    Mini-CogTM Copyright NICKI Daniel. Licensed by the author for use in [facility]; reprinted with permission (nima@[facility]). All rights reserved.      Do you have sleep apnea, excessive snoring or daytime drowsiness?: yes    Reviewed and updated as needed this visit by clinical staff                    Reviewed and updated as needed this visit by Provider                   Social History     Tobacco Use     Smoking status: Former Smoker     Packs/day: 0.50     Years: 10.00     Pack years: 5.00     Types: Cigarettes     Smokeless tobacco: Never Used     Tobacco comment: 35 years    Substance Use Topics     Alcohol use: Yes     Alcohol/week: 0.0 standard drinks     Comment: glass of wine per day or less     If you drink alcohol do you typically have >3 drinks per day or >7 drinks per week? No    Alcohol Use 9/17/2022   Prescreen: >3 drinks/day or >7 drinks/week? No   Prescreen: >3 drinks/day or >7 drinks/week? -   No flowsheet data found.            Current providers sharing in care for this patient include:   Patient Care Team:  No Ref-Primary, Physician as PCP - Oumar Dang MD as Assigned PCP    The following health maintenance items are reviewed in Epic and correct as of today:  Health Maintenance   Topic Date Due     COVID-19 Vaccine (1) Never done     HEPATITIS C SCREENING  Never done     LUNG CANCER SCREENING  Never done     INFLUENZA VACCINE (1) 09/01/2022     MEDICARE ANNUAL WELLNESS VISIT  09/23/2022     ANNUAL REVIEW OF HM ORDERS  09/23/2022     FALL RISK ASSESSMENT  09/23/2023     DTAP/TDAP/TD IMMUNIZATION (4 - Td or Tdap) 09/15/2026     LIPID  09/23/2026     ADVANCE CARE PLANNING  09/23/2026     PHQ-2 (once per calendar year)  Completed     Pneumococcal Vaccine: 65+ Years  Completed     ZOSTER IMMUNIZATION  Completed     IPV IMMUNIZATION  Aged Out     MENINGITIS  "IMMUNIZATION  Aged Out     HEPATITIS B IMMUNIZATION  Aged Out     Lab work is in process  Pneumonia Vaccine:For adults 65 years or older who do not have an immunocompromising condition, cerebrospinal fluid leak, or cochlear implant and want to receive PCV13 AND PPSV23: Administer 1 dose of PCV13 first then give 1 dose of PPSV23 at least 1 year later. If the patient already received PPSV23, give the dose of PCV13 at least 1 year after they received the most recent dose of PPSV23. Anyone who received any doses of PPSV23 before age 65 should receive 1 final dose of the vaccine at age 65 or older. Administer this last dose at least 5 years after the prior PPSV23 dose.        Review of Systems   Constitutional: Negative for chills and fever.   HENT: Positive for congestion and hearing loss. Negative for ear pain and sore throat.    Eyes: Positive for visual disturbance. Negative for pain.   Respiratory: Positive for cough and shortness of breath.    Cardiovascular: Negative for chest pain, palpitations and peripheral edema.   Gastrointestinal: Positive for hematochezia. Negative for abdominal pain, constipation, diarrhea, heartburn and nausea.   Genitourinary: Positive for impotence. Negative for dysuria, frequency, genital sores, hematuria, penile discharge and urgency.   Musculoskeletal: Positive for arthralgias and myalgias. Negative for joint swelling.   Skin: Negative for rash.   Neurological: Positive for dizziness and paresthesias. Negative for weakness and headaches.   Psychiatric/Behavioral: Negative for mood changes. The patient is nervous/anxious.          OBJECTIVE:   There were no vitals taken for this visit. Estimated body mass index is 24.47 kg/m  as calculated from the following:    Height as of 9/23/21: 1.676 m (5' 6\").    Weight as of 9/23/21: 68.8 kg (151 lb 9.6 oz).  Physical Exam  GENERAL: healthy, alert and no distress  EYES: Eyes grossly normal to inspection, PERRL and conjunctivae and sclerae " normal  HENT: ear canals and TM's normal, nose and mouth without ulcers or lesions  NECK: no adenopathy, no asymmetry, masses, or scars and thyroid normal to palpation  RESP: lungs clear to auscultation - no rales, rhonchi or wheezes  CV: regular rate and rhythm, normal S1 S2, no S3 or S4, no murmur, click or rub, no peripheral edema and peripheral pulses strong  ABDOMEN: soft, nontender, no hepatosplenomegaly, no masses and bowel sounds normal  MS: no gross musculoskeletal defects noted, no edema  SKIN: Numerous seborrheic keratosis and actinic keratosis on the back and front of chest  NEURO: Normal strength and tone, mentation intact and speech normal  PSYCH: mentation appears normal, affect normal/bright    Diagnostic Test Results:  Labs reviewed in Epic    ASSESSMENT / PLAN:   (E78.5) Hyperlipidemia LDL goal <130  (primary encounter diagnosis)  Comment:   Plan: simvastatin (ZOCOR) 20 MG tablet, Lipid panel         reflex to direct LDL Fasting            (I10) Essential hypertension with goal blood pressure less than 140/90  Comment: He was complaining of some dizziness when he gets up  Decrease the losartan to 50 mg  Plan: losartan (COZAAR) 50 MG tablet, Comprehensive         metabolic panel (BMP + Alb, Alk Phos, ALT, AST,        Total. Bili, TP)            (Z00.00) Encounter for Medicare annual wellness exam  Comment: Up-to-date with all the vaccines  Up-to-date with colonoscopy  He has not followed in the last 1 year  Plan:     (Z12.5) Screening for prostate cancer  Comment: He had elevated PSA in the past and seen urology  Plan: PSA, screen            (J30.2) Seasonal allergic rhinitis, unspecified trigger  Comment: Complaining of runny nose and postnasal drip  Plan: fexofenadine (ALLEGRA) 180 MG tablet,         mometasone (NASONEX) 50 MCG/ACT nasal spray            (N52.9) Erectile dysfunction, unspecified erectile dysfunction type  Comment:   Plan: sildenafil (VIAGRA) 50 MG tablet            (Z23) Need for  "prophylactic vaccination and inoculation against influenza  Comment:   Plan: INFLUENZA, QUAD, HIGH DOSE, PF, 65YR + (FLUZONE        HD)            (I95.1) Orthostatic hypotension  Comment: Decrease the losartan dose to 50 mg  Plan:     Patient has been advised of split billing requirements and indicates understanding: Yes    COUNSELING:  Reviewed preventive health counseling, as reflected in patient instructions       Regular exercise       Healthy diet/nutrition       Vision screening       Hearing screening       Dental care       Bladder control       Fall risk prevention       Prostate cancer screening    Estimated body mass index is 24.47 kg/m  as calculated from the following:    Height as of 9/23/21: 1.676 m (5' 6\").    Weight as of 9/23/21: 68.8 kg (151 lb 9.6 oz).        He reports that he has quit smoking. His smoking use included cigarettes. He has a 5.00 pack-year smoking history. He has never used smokeless tobacco.      Appropriate preventive services were discussed with this patient, including applicable screening as appropriate for cardiovascular disease, diabetes, osteopenia/osteoporosis, and glaucoma.  As appropriate for age/gender, discussed screening for colorectal cancer, prostate cancer, breast cancer, and cervical cancer. Checklist reviewing preventive services available has been given to the patient.    Reviewed patients plan of care and provided an AVS. The Basic Care Plan (routine screening as documented in Health Maintenance) for Alonso meets the Care Plan requirement. This Care Plan has been established and reviewed with the Patient.    Counseling Resources:  ATP IV Guidelines  Pooled Cohorts Equation Calculator  Breast Cancer Risk Calculator  Breast Cancer: Medication to Reduce Risk  FRAX Risk Assessment  ICSI Preventive Guidelines  Dietary Guidelines for Americans, 2010  [employer] MyPlate  ASA Prophylaxis  Lung CA Screening    Oumar Luke MD  [facility] " SANDY    Identified Health Risks:

## 2022-09-23 NOTE — PROGRESS NOTES
"    The patient was provided with suggestions to help him develop a healthy physical lifestyle.  The patient was counseled and encouraged to consider modifying their diet and eating habits. He was provided with information on recommended healthy diet options.  The patient was provided with written information regarding signs of hearing loss.  Answers for HPI/ROS submitted by the patient on 9/17/2022  In general, how would you rate your overall physical health?: fair  Frequency of exercise:: 2-3 days/week  Do you usually eat at least 4 servings of fruit and vegetables a day, include whole grains & fiber, and avoid regularly eating high fat or \"junk\" foods? : No  Taking medications regularly:: Yes  Medication side effects:: Muscle aches, Lightheadedness  Activities of Daily Living: no assistance needed  Home safety: no safety concerns identified  Hearing Impairment:: difficulty following a conversation in a noisy restaurant or crowded room, feel that people are mumbling or not speaking clearly, difficult to understand a speaker at a public meeting or [employer] service, need to ask people to speak up or repeat themselves, find that men's voices are easier to understand than woman's, difficulty understanding soft or whispered speech, difficulty understanding speech on the telephone  In the past 6 months, have you been bothered by leaking of urine?: No  abdominal pain: No  Blood in stool: Yes  Blood in urine: No  chest pain: No  chills: No  congestion: Yes  constipation: No  cough: Yes  diarrhea: No  dizziness: Yes  ear pain: No  eye pain: No  nervous/anxious: Yes  fever: No  frequency: No  genital sores: No  headaches: No  hearing loss: Yes  heartburn: No  arthralgias: Yes  joint swelling: No  peripheral edema: No  mood changes: No  myalgias: Yes  nausea: No  dysuria: No  palpitations: No  Skin sensation changes: Yes  sore throat: No  urgency: No  rash: No  shortness of breath: Yes  visual disturbance: Yes  weakness: " No  impotence: Yes  penile discharge: No  In general, how would you rate your overall mental or emotional health?: good  Additional concerns today:: Yes  Duration of exercise:: N/A

## 2022-09-23 NOTE — NURSING NOTE
Prior to immunization administration, verified patients identity using patient s name and date of birth. Please see Immunization Activity for additional information.     Screening Questionnaire for Adult Immunization    Are you sick today?   No   Do you have allergies to medications, food, a vaccine component or latex?   No   Have you ever had a serious reaction after receiving a vaccination?   No   Do you have a long-term health problem with heart, lung, kidney, or metabolic disease (e.g., diabetes), asthma, a blood disorder, no spleen, complement component deficiency, a cochlear implant, or a spinal fluid leak?  Are you on long-term aspirin therapy?   No   Do you have cancer, leukemia, HIV/AIDS, or any other immune system problem?   No   Do you have a parent, brother, or sister with an immune system problem?   No   In the past 3 months, have you taken medications that affect  your immune system, such as prednisone, other steroids, or anticancer drugs; drugs for the treatment of rheumatoid arthritis, Crohn s disease, or psoriasis; or have you had radiation treatments?   No   Have you had a seizure, or a brain or other nervous system problem?   No   During the past year, have you received a transfusion of blood or blood    products, or been given immune (gamma) globulin or antiviral drug?   No   For women: Are you pregnant or is there a chance you could become       pregnant during the next month?   No   Have you received any vaccinations in the past 4 weeks?   No     Immunization questionnaire answers were all negative.        Per orders of Dr. Luke, injection of Fluzone HD given by Jaycee Banks RN. Patient instructed to remain in clinic for 15 minutes afterwards, and to report any adverse reaction to me immediately.       Screening performed by Jaycee Banks RN on 9/23/2022 at 8:01 AM.

## 2022-09-23 NOTE — PATIENT INSTRUCTIONS
Patient Education   Personalized Prevention Plan  You are due for the preventive services outlined below.  Your care team is available to assist you in scheduling these services.  If you have already completed any of these items, please share that information with your care team to update in your medical record.  Health Maintenance Due   Topic Date Due     COVID-19 Vaccine (1) Never done     Flu Vaccine (1) 09/01/2022     Your Health Risk Assessment indicates you feel you are not in good health    A healthy lifestyle helps keep the body fit and the mind alert. It helps protect you from disease, helps you fight disease, and helps prevent chronic disease (disease that doesn't go away) from getting worse. This is important as you get older and begin to notice twinges in muscles and joints and a decline in the strength and stamina you once took for granted. A healthy lifestyle includes good healthcare, good nutrition, weight control, recreation, and regular exercise. Avoid harmful substances and do what you can to keep safe. Another part of a healthy lifestyle is stay mentally active and socially involved.    Good healthcare     Have a wellness visit every year.     If you have new symptoms, let us know right away. Don't wait until the next checkup.     Take medicines exactly as prescribed and keep your medicines in a safe place. Tell us if your medicine causes problems.   Healthy diet and weight control     Eat 3 or 4 small, nutritious, low-fat, high-fiber meals a day. Include a variety of fruits, vegetables, and whole-grain foods.     Make sure you get enough calcium in your diet. Calcium, vitamin D, and exercise help prevent osteoporosis (bone thinning).     If you live alone, try eating with others when you can. That way you get a good meal and have company while you eat it.     Try to keep a healthy weight. If you eat more calories than your body uses for energy, it will be stored as fat and you will gain weight.      Recreation   Recreation is not limited to sports and team events. It includes any activity that provides relaxation, interest, enjoyment, and exercise. Recreation provides an outlet for physical, mental, and social energy. It can give a sense of worth and achievement. It can help you stay healthy.    Mental Exercise and Social Involvement  Mental and emotional health is as important as physical health. Keep in touch with friends and family. Stay as active as possible. Continue to learn and challenge yourself.   Things you can do to stay mentally active are:    Learn something new, like a foreign language or musical instrument.     Play SCRABBLE or do crossword puzzles. If you cannot find people to play these games with you at home, you can play them with others on your computer through the Internet.     Join a games club--anything from card games to chess or checkers or lawn bowling.     Start a new hobby.     Go back to school.     Volunteer.     Read.   Keep up with world events.    Understanding USDA MyPlate  The USDA has guidelines to help you make healthy food choices. These are called MyPlate. MyPlate shows the food groups that make up healthy meals using the image of a place setting. Before you eat, think about the healthiest choices for what to put on your plate or in your cup or bowl. To learn more about building a healthy plate, visit www.choosemyplate.gov.    The food groups    Fruits. Any fruit or 100% fruit juice counts as part of the Fruit Group. Fruits may be fresh, canned, frozen, or dried, and may be whole, cut-up, or pureed. Make 1/2 of your plate fruits and vegetables.    Vegetables. Any vegetable or 100% vegetable juice counts as a member of the Vegetable Group. Vegetables may be fresh, frozen, canned, or dried. They can be served raw or cooked and may be whole, cut-up, or mashed. Make 1/2 of your plate fruits and vegetables.    Grains. All foods made from grains are part of the Grains Group.  These include wheat, rice, oats, cornmeal, and barley. Grains are often used to make foods such as bread, pasta, oatmeal, cereal, tortillas, and grits. Grains should be no more than 1/4 of your plate. At least half of your grains should be whole grains.    Protein. This group includes meat, poultry, seafood, beans and peas, eggs, processed soy products (such as tofu), nuts (including nut butters), and seeds. Make protein choices no more than 1/4 of your plate. Meat and poultry choices should be lean or low fat.    Dairy. The Dairy Group includes all fluid milk products and foods made from milk that contain calcium, such as yogurt and cheese. (Foods that have little calcium, such as cream, butter, and cream cheese, are not part of this group.) Most dairy choices should be low-fat or fat-free.    Oils. Oils aren't a food group, but they do contain essential nutrients. However it's important to watch your intake of oils. These are fats that are liquid at room temperature. They include canola, corn, olive, soybean, vegetable, and sunflower oil. Foods that are mainly oil include mayonnaise, certain salad dressings, and soft margarines. You likely already get your daily oil allowance from the foods you eat.  Things to limit  Eating healthy also means limiting these things in your diet:       Salt (sodium). Many processed foods have a lot of sodium. To keep sodium intake down, eat fresh vegetables, meats, poultry, and seafood when possible. Purchase low-sodium, reduced-sodium, or no-salt-added food products at the store. And don't add salt to your meals at home. Instead, season them with herbs and spices such as dill, oregano, cumin, and paprika. Or try adding flavor with lemon or lime zest and juice.    Saturated fat. Saturated fats are most often found in animal products such as beef, pork, and chicken. They are often solid at room temperature, such as butter. To reduce your saturated fat intake, choose leaner cuts of  meat and poultry. And try healthier cooking methods such as grilling, broiling, roasting, or baking. For a simple lower-fat swap, use plain nonfat yogurt instead of mayonnaise when making potato salad or macaroni salad.    Added sugars. These are sugars added to foods. They are in foods such as ice cream, candy, soda, fruit drinks, sports drinks, energy drinks, cookies, pastries, jams, and syrups. Cut down on added sugars by sharing sweet treats with a family member or friend. You can also choose fruit for dessert, and drink water or other unsweetened beverages.     [employer] last reviewed this educational content on 6/1/2020 2000-2021 [employer]. All rights reserved. This information is not intended as a substitute for professional medical care. Always follow your healthcare professional's instructions.          Signs of Hearing Loss      Hearing much better with one ear can be a sign of hearing loss.   Hearing loss is a problem shared by many people. In fact, it is one of the most common health problems, particularly as people age. Most people age 65 and older have some hearing loss. By age 80, almost everyone does. Hearing loss often occurs slowly over the years. So you may not realize your hearing has gotten worse.  Have your hearing checked  Call your healthcare provider if you:    Have to strain to hear normal conversation    Have to watch other people s faces very carefully to follow what they re saying    Need to ask people to repeat what they ve said    Often misunderstand what people are saying    Turn the volume of the television or radio up so high that others complain    Feel that people are mumbling when they re talking to you    Find that the effort to hear leaves you feeling tired and irritated    Notice, when using the phone, that you hear better with one ear than the other  [employer] last reviewed this educational content on 1/1/2020 2000-2021 [employer]. All  rights reserved. This information is not intended as a substitute for professional medical care. Always follow your healthcare professional's instructions.

## 2022-10-15 DIAGNOSIS — J30.2 SEASONAL ALLERGIC RHINITIS, UNSPECIFIED TRIGGER: ICD-10-CM

## 2022-10-18 RX ORDER — MOMETASONE FUROATE MONOHYDRATE 50 UG/1
SPRAY, METERED NASAL
Qty: 17 G | Refills: 1 | Status: SHIPPED | OUTPATIENT
Start: 2022-10-18 | End: 2024-05-31

## 2023-02-13 DIAGNOSIS — E78.5 HYPERLIPIDEMIA LDL GOAL <130: ICD-10-CM

## 2023-02-13 RX ORDER — SIMVASTATIN 20 MG
TABLET ORAL
Qty: 90 TABLET | Refills: 1 | Status: SHIPPED | OUTPATIENT
Start: 2023-02-13 | End: 2023-08-04

## 2023-06-26 ENCOUNTER — MYC MEDICAL ADVICE (OUTPATIENT)
Dept: FAMILY MEDICINE | Facility: CLINIC | Age: 78
End: 2023-06-26
Payer: COMMERCIAL

## 2023-06-26 DIAGNOSIS — Z12.11 COLON CANCER SCREENING: Primary | ICD-10-CM

## 2023-06-26 NOTE — TELEPHONE ENCOUNTER
Routing to provider to review and advise, patient requesting a referral for colonoscopy. Due for screening exam this year. Last one performed in 2018, patient was advised to repeat in 5 years.      Cailin Reid RN  Clinical Triage/Primary Care  [facility]

## 2023-07-05 ENCOUNTER — TELEPHONE (OUTPATIENT)
Dept: GASTROENTEROLOGY | Facility: CLINIC | Age: 78
End: 2023-07-05
Payer: COMMERCIAL

## 2023-07-05 ENCOUNTER — HOSPITAL ENCOUNTER (OUTPATIENT)
Facility: AMBULATORY SURGERY CENTER | Age: 78
End: 2023-07-05
Attending: INTERNAL MEDICINE
Payer: COMMERCIAL

## 2023-07-05 NOTE — TELEPHONE ENCOUNTER
"Pt requesting SUTAB prep. Miralx was sent but pt said that they would like to speak to the nurse when they call for pre assessment about the SUBTAB instead. Pt agreed on talking at the time of pre assessment call      Endoscopy Scheduling Screen    Have you had a positive Covid test in the last 14 days?  Yes (Schedule at least 14 days from symptom onset)    Are you active on MyChart?   Yes    What insurance is in the chart?  Other:  [facility]    Ordering/Referring Provider: Oumar Luke MD in [facility]   (If ordering provider performs procedure, schedule with ordering provider unless otherwise instructed. )    BMI: Estimated body mass index is 23.5 kg/m  as calculated from the following:    Height as of 9/23/22: 1.689 m (5' 6.5\").    Weight as of 9/23/22: 67 kg (147 lb 12.8 oz).     Sedation Ordered  moderate sedation.   If patient BMI > 50 do not schedule in ASC.    Are you taking any prescription medications for pain?   No    Are you taking methadone or Suboxone?  No    Do you have a history of malignant hyperthermia or adverse reaction to anesthesia?  No    (Females) Are you currently pregnant?   No     Have you been diagnosed or told you have pulmonary hypertension?   No    Do you have an LVAD?  No    Have you been told you have moderate to severe sleep apnea?  No    Have you been told you have COPD, asthma, or any other lung disease?  No    Do you have any heart conditions?  No     Have you ever had or are you awaiting a heart or lung transplant?   No    Have you had a stroke or transient ischemic attack (TIA aka \"mini stroke\" in the last 6 months?   No    Have you been diagnosed with or been told you have cirrhosis of the liver?   No    Are you currently on dialysis?   No    Do you need assistance transferring?   No    BMI: Estimated body mass index is 23.5 kg/m  as calculated from the following:    Height as of 9/23/22: 1.689 m (5' 6.5\").    Weight as of 9/23/22: 67 kg (147 lb 12.8 oz).     Is patients BMI " > 40 and scheduling location [facility]?  No    Do you take the medication Phentermine, Ozempic or Wegovy?  No    Do you take the medication Naltrexone?  No    Do you take blood thinners?  No      Prep   Are you currently on dialysis or do you have chronic kidney disease?  No    Do you have a diagnosis of diabetes?  No    Do you have a diagnosis of cystic fibrosis (CF)?  No    On a regular basis do you go 3 -5 days between bowel movements?  No    BMI > 40?  No    Preferred Pharmacy:        [id number] - [place], [place] - [address]  [address]  [facility] [address]  Phone: [phone] Fax: [phone]      Final Scheduling Details   Colonoscopy prep sent?  MiraLAX (No Mag Citrate)    Procedure scheduled  Colonoscopy    Surgeon:  Luis      Date of procedure:  08/07/2023     Schedule PAC:   No    Location  [facility]    Sedation   Moderate Sedation    Patient Reminders:    You will receive a call from a Nurse to review instructions and health history.  This assessment must be completed prior to your procedure.  Failure to complete the Nurse assessment may result in the procedure being cancelled.       On the day of your procedure, please designate an adult(s) who can drive you home stay with you for the next 24 hours. The medicines used in the exam will make you sleepy. You will not be able to drive.       You cannot take public transportation, ride share services, or non-medical taxi service without a responsible caregiver.  Medical transport services are allowed with the requirement that a responsible caregiver will receive you at your destination.  We require that drivers and caregivers are confirmed prior to your procedure.

## 2023-07-26 ENCOUNTER — TELEPHONE (OUTPATIENT)
Dept: GASTROENTEROLOGY | Facility: CLINIC | Age: 78
End: 2023-07-26
Payer: COMMERCIAL

## 2023-07-26 NOTE — TELEPHONE ENCOUNTER
Rescheduled: Yes  Procedure: Lower Endoscopy [Colonoscopy]   Date: 08/17/2023  Location: [facility]; [address], [place] [address]  Surgeon: Zaynab  Sedation Level Scheduled  moderate,  Reason for Sedation Level per order  Prep/Instructions updated and sent: y     Send In - basket message to Panc - Cheikh Pool if EUS  procedure is canceled or rescheduled: [ N/A, YES or NO] na

## 2023-07-26 NOTE — TELEPHONE ENCOUNTER
Caller:   Reason for Reschedule/Cancellation (please be detailed, any staff messages or encounters to note?): [facility] RESCHEDULES      Prior to reschedule please review:  Ordering Provider:     FAZAL MONAE     Sedation per order: CS  Does patient have any ASC Exclusions, please identify?:       Notes on Cancelled Procedure:  Procedure: Lower Endoscopy [Colonoscopy]   Date: 8/7  Location: [facility]; [address], [place] [address]  Surgeon: STEADRIANA      Rescheduled: No LVM  CASE IN DEPOT  Procedure:    Date:   Location:   Surgeon:   Sedation Level Scheduled  ,  Reason for Sedation Level   Prep/Instructions updated and sent:      Send In - basket message to Panc - Che[facility] Pool if EUS  procedure is canceled or rescheduled: [ N/A, YES or NO]

## 2023-07-31 ENCOUNTER — MYC MEDICAL ADVICE (OUTPATIENT)
Dept: FAMILY MEDICINE | Facility: CLINIC | Age: 78
End: 2023-07-31

## 2023-07-31 ENCOUNTER — TELEPHONE (OUTPATIENT)
Dept: FAMILY MEDICINE | Facility: CLINIC | Age: 78
End: 2023-07-31

## 2023-07-31 ENCOUNTER — OFFICE VISIT (OUTPATIENT)
Dept: URGENT CARE | Facility: URGENT CARE | Age: 78
End: 2023-07-31
Payer: COMMERCIAL

## 2023-07-31 VITALS
RESPIRATION RATE: 18 BRPM | DIASTOLIC BLOOD PRESSURE: 78 MMHG | OXYGEN SATURATION: 100 % | BODY MASS INDEX: 22.56 KG/M2 | TEMPERATURE: 97.8 F | WEIGHT: 141.9 LBS | HEART RATE: 75 BPM | SYSTOLIC BLOOD PRESSURE: 147 MMHG

## 2023-07-31 DIAGNOSIS — R00.0 RAPID OR IRREGULAR HEARTBEAT: ICD-10-CM

## 2023-07-31 DIAGNOSIS — R00.2 PALPITATIONS: ICD-10-CM

## 2023-07-31 DIAGNOSIS — E78.5 HYPERLIPIDEMIA LDL GOAL <130: ICD-10-CM

## 2023-07-31 DIAGNOSIS — I10 ESSENTIAL HYPERTENSION WITH GOAL BLOOD PRESSURE LESS THAN 140/90: ICD-10-CM

## 2023-07-31 DIAGNOSIS — R42 DIZZINESS: Primary | ICD-10-CM

## 2023-07-31 DIAGNOSIS — I95.1 ORTHOSTATIC HYPOTENSION: ICD-10-CM

## 2023-07-31 DIAGNOSIS — R29.6 FREQUENT FALLS: ICD-10-CM

## 2023-07-31 LAB
ALBUMIN SERPL BCG-MCNC: 4.3 G/DL (ref 3.5–5.2)
ALP SERPL-CCNC: 80 U/L (ref 40–129)
ALT SERPL W P-5'-P-CCNC: 15 U/L (ref 0–70)
ANION GAP SERPL CALCULATED.3IONS-SCNC: 10 MMOL/L (ref 7–15)
AST SERPL W P-5'-P-CCNC: 25 U/L (ref 0–45)
BASOPHILS # BLD AUTO: 0 10E3/UL (ref 0–0.2)
BASOPHILS NFR BLD AUTO: 0 %
BILIRUB SERPL-MCNC: 0.6 MG/DL
BUN SERPL-MCNC: 9.9 MG/DL (ref 8–23)
CALCIUM SERPL-MCNC: 9.6 MG/DL (ref 8.8–10.2)
CHLORIDE SERPL-SCNC: 102 MMOL/L (ref 98–107)
CREAT SERPL-MCNC: 0.85 MG/DL (ref 0.67–1.17)
DEPRECATED HCO3 PLAS-SCNC: 26 MMOL/L (ref 22–29)
EOSINOPHIL # BLD AUTO: 0.1 10E3/UL (ref 0–0.7)
EOSINOPHIL NFR BLD AUTO: 1 %
ERYTHROCYTE [DISTWIDTH] IN BLOOD BY AUTOMATED COUNT: 12.2 % (ref 10–15)
GFR SERPL CREATININE-BSD FRML MDRD: 88 ML/MIN/1.73M2
GLUCOSE SERPL-MCNC: 110 MG/DL (ref 70–99)
HCT VFR BLD AUTO: 41.1 % (ref 40–53)
HGB BLD-MCNC: 13.7 G/DL (ref 13.3–17.7)
IMM GRANULOCYTES # BLD: 0 10E3/UL
IMM GRANULOCYTES NFR BLD: 0 %
LYMPHOCYTES # BLD AUTO: 2 10E3/UL (ref 0.8–5.3)
LYMPHOCYTES NFR BLD AUTO: 29 %
MCH RBC QN AUTO: 32.4 PG (ref 26.5–33)
MCHC RBC AUTO-ENTMCNC: 33.3 G/DL (ref 31.5–36.5)
MCV RBC AUTO: 97 FL (ref 78–100)
MONOCYTES # BLD AUTO: 0.9 10E3/UL (ref 0–1.3)
MONOCYTES NFR BLD AUTO: 13 %
NEUTROPHILS # BLD AUTO: 3.8 10E3/UL (ref 1.6–8.3)
NEUTROPHILS NFR BLD AUTO: 56 %
PLATELET # BLD AUTO: 238 10E3/UL (ref 150–450)
POTASSIUM SERPL-SCNC: 4.7 MMOL/L (ref 3.4–5.3)
PROT SERPL-MCNC: 7.4 G/DL (ref 6.4–8.3)
RBC # BLD AUTO: 4.23 10E6/UL (ref 4.4–5.9)
SODIUM SERPL-SCNC: 138 MMOL/L (ref 136–145)
WBC # BLD AUTO: 6.8 10E3/UL (ref 4–11)

## 2023-07-31 PROCEDURE — 99215 OFFICE O/P EST HI 40 MIN: CPT | Mod: 25 | Performed by: FAMILY MEDICINE

## 2023-07-31 PROCEDURE — 36415 COLL VENOUS BLD VENIPUNCTURE: CPT | Performed by: FAMILY MEDICINE

## 2023-07-31 PROCEDURE — 93000 ELECTROCARDIOGRAM COMPLETE: CPT | Performed by: FAMILY MEDICINE

## 2023-07-31 PROCEDURE — 80053 COMPREHEN METABOLIC PANEL: CPT | Performed by: FAMILY MEDICINE

## 2023-07-31 PROCEDURE — 85025 COMPLETE CBC W/AUTO DIFF WBC: CPT | Performed by: FAMILY MEDICINE

## 2023-07-31 NOTE — PROGRESS NOTES
ASSESSMENT/PLAN:      ICD-10-CM    1. Dizziness  R42 EKG 12-lead complete w/read - Clinics     Orthostatic blood pressure and pulse     CBC with platelets and differential     Comprehensive metabolic panel     CBC with platelets and differential     Comprehensive metabolic panel    uncertain etiology, nml ekg,nml neuo and ENT exam, nml pulse nml orthostatics blood presuures and HR but symptomatic, referred to cardiology for further evaluation, encouraged increase clear fluid intake-drinking less for the last 2-3 days due to nausea -symptoms could be worsened by mild dehydration      2. Orthostatic hypotension  I95.1     pt with nml orthostatic BP/HR today  - concerning that the bp/HR did not meet criteria for orthostatic hypotensions, however he was symptomatic-lightheaded with moving from supine-sitting, even worse with sitting to standing -cardiology  referral completed to have his evaluated in the next few days-patient refused further evaluation in the ER       3. Palpitations  R00.2 EKG 12-lead complete w/read - Clinics     CBC with platelets and differential     Comprehensive metabolic panel     CBC with platelets and differential     Comprehensive metabolic panel    nml ekg, nml HR, asymptomatic during the several hours he was here today  in urgent care      4. Rapid or irregular heartbeat  R00.0 EKG 12-lead complete w/read - Clinics    intermittent, no episodes of increase heart rate/palpitaions today or last pm      5. Frequent falls  R29.6    More frequent episodes, longer in duration of feeling lightheaded/off balance, heart racing in last 2-3 days and  falling 3-4 times in the last few weeks, no syncope, no loc, no hx of hitting head, not on chronic anticoagulation therapy      6. Essential hypertension with goal blood pressure less than 140/90  I10    No changes in his blood pressure medications, stable home bp readings as noted by patient      7. Hyperlipidemia LDL goal <130  E78.5      "    I advised patient to go to the ER for further evaluation of his current worsening symptoms of dizziness shortness of breath dyspnea on exertion fatigue nausea and frequent falls/feeling lightheaded now for the last month with worsening symptoms in the last 2 days. The patient refused because he was feeling \"fine\" right now and he felt there was no \"emergency \" that would be a reason for him to the ER.      I informed him that I could not reassure him that there was not something  wrong with his heart or his blood pressure based on normal orthostatic blood pressures, however he was symptomatic at the time lightheaded, off balance with  moving from supine/sitting/sitting to standing- may be due to dehydration-decrease fluid intake due to nausea-can not give IV hydration in the clinic  Can not  rule out a dangerous abnormal heart beat - based on just a a few hours of time in the clinic without his heart racing and  his normal EKG - and informed him that an abnormal heart beat could return, and if not evaluated appropriately could lead to a stroke.    In addition, the limited laboratory resources -the CBC and a CMP I completed today-would only rule out possible anemia, blood disorder-the cmp-imbalance of your electrolytes, high blood sugar or liver disease-which if these are significant abnormal he would be instructed to go to the ER.  In addition I cannot reassure him or rule out  that there is not something affecting his brain -for example a possible tia/small stroke that could have happened in the past causing him to feel lightheaded and subsequent falls .based only on his labs, clinical history and physical exam .    After reviewing my concerns with the patient including the information as noted, he continued to refuse to go the ER    Patient informed normal CBC results prior to leaving clinic and that he would contact her by phone if the CMP is abnormal. If the  CMP is normal I would not be calling him and will " send a note via  [employer] result note -patient agreed with plan and will check my chart this evening or in the am if he does not receive a call regarding the results of the CMP.     When the patient left the urgent care clinic to go home, he said he understands the risks of going home and not going to the ER as noted and  verbally reviewed with him by this clinician. He shad no further questions and agreed to go the to ER if his symptoms returned or worsened in any way.          Patient Instructions     Cardiology will contact you for an appointment -will look like a spam call on your phone    If increase in falls, headaches, changes in vision, chest pressure, heart racing, worsening light headed, weakness, to ER    oRopa Newberry MD     Emergency Dept, [facility]   [place]   [address] [place] [address]       Reviewed medication instructions and side effects. Follow up if experiences side effects.     I reviewed supportive care, otc meds to use if needed, expected course, and signs of concern.  Follow up as needed or if he does not improve within  1-2 days or if worsens in any way.  Reviewed red flag symptoms and is to go to the ER if experiences any of these.     The use of [employer] dictation services may have been used to construct the content in this note; any grammatical or spelling errors are non-intentional. Please contact the author of this note directly if you are in need of any clarification.      On the day of the encounter, time spend on chart review, patient visit, review of testing, documentation was 60 minutes              Patient presents with:  Dizziness: Dizziness started 5 days ago and has gotten progressively worse, nausea, vomited 3 days ago, high blood pressure, slight headache, nausea, blurry vision and inability to focus, has fallen 3x since the dizziness started but hasn't hit his head        Subjective     Alonso Churchill is a 78  "year old male who presents to clinic today for the following health issues:    HPI     Dizziness    Duration: intermittent for 1 month    Patient is here today due increase in number of episodes in the last 2 days of feeling \"dizziness' more feels \"off balance\"  and episodes are lasting longer     Description   Feeling faint:  no   Feeling like the surroundings are moving?:   no -he feels off balance \"has to hang on to something to keep from falling\"     Worse with activity/head movement:-no     Loss of consciousness or falls: NO denies hx of syncope or near syncope, no LOC prior to or after he has fallen    Frequent falls-has fallen 3-4 times since onset of symptoms a month ago    Typically can catch himself with his arms/hands or tries to sit down so he lands on his buttocks / has landed on his knees   no significant injury or ongoing pain/swelling in his knees/joints/extremities from his falls, denies/states he has never hit his head with episodes of falling in the last month      Last time he remembers falling was around Wednesday/Thursday (7/26-7/27/2023) of last week    Accompanying signs and symptoms:     Nausea/vomitting: YES- in the last 2 days more nauseated-occurs randomly, onset of nausea not associated with episodes dizziness, feeling  off balance   Nausea can become worse-episodes of emesis in the last 2 days when nausea worsens with episodes of dizziness     Denies changes in diet/no emesis, except as noted no constipation or diarrhea, no hx of chronic/recurrent nausea, no GERD symptoms no abdominal pain, no other family members with GI symptoms/n/v/d   He does have a dx of IBS noted on his problem list, but denies having any  IBS symptoms for the past several years   Scheduled for his routine follow up colonoscopy and cancelled appointment due to his dizziness/light headed     No fever, no urinary tract infection symptoms or urinary retention    He does admit to a decrease in his intake of food and " "water from baseline n the last 4-5  days    Palpitations: YES- on occasion he will feel an \"extra heart beat\" and his heart will start to race for a few seconds and then resolves-can happen multiple times throughout the day, prior to going to sleep, has not awakened him from sleep-no history of irregular heartbeat or palpitations in the past  He has not checked his pulse when he has had his episodes-denies having any episodes of racing heart or extra heartbeats since arrival to urgent care today, no episodes last night-recalls last episode was likely yesterday during the day    Weakness in arms or legs: NO    Vision or speech changes:NO speech changes/Intermittent vision changes- vision will be blurry for a few seconds with onset of dizziness/light headed when moves from laying to sit/stand/sitting to standing     Ringing in ears (Tinnitus): no   Hearing loss related to dizziness: no     Other (fevers/chills/sweating/dyspnea):   YES-shortness of breath/dyspnea on exertion  episodes of shortness of breath and dyspnea on exertion doing simple tasks-  Off and on for the last month -no history of similar in the past, has not notice even a subtle decrease in his exercise tolerance prior to this last month    Denies fever, cough, wheezing, no hx of asthma, no sore throat/sinus pain/pressure, ear pain or ear fullness, no headaches, no chest pain or pressure associated with shortness of breath and/or dyspnea on exertion, no increase swelling in his legs      History (similar episodes/head trauma/previous evaluation/recent bleeding): No    No history of previous head trauma , no history of a bleeding disorder and is not on chronic anticoagulation therapy, he recalls occasional similar episodes of feeling lightheaded short-lived only 1 or 2 days in the past and would resolve-since they resolved so quickly he never came in to be evaluated for these episodes or discussed this with his PCP      Precipitating or alleviating " factors (new meds/chemicals):   Past medical history remarkable for hypertension and hyperlipidemia    He has been on the same dose of losartan 100 mg a day there was a time he was advised to decrease to 50 mg but patient chose to continue the 100 milligrams daily.  He monitors his blood pressures at home and denies any decrease in his blood pressures after choosing to continue the 100 mg dose-his typical systolic blood pressure runs from   Sbp 130s to 160s over dbp in the  80s.  Denies ever having blood pressure any lower than 130/80.                     He has a prescription for Viagra-has not used this medication in the last 2 or more months      He takes his flonase prn for seasonal allergies, at present he is not having any of his typical allergy symptoms. He is currently not on any over-the-counter allergy medication but does not take his medications and this is having worsening of his seasonal allergy symptoms     Hyperlipidemia- taking current dose of 20 mg simvastatin for many years for hyperlipidemia , he denies any  side effects at present or in the past from the simvastatin    He has a 35-pack-year history of smoking and just recently quit smoking.-In the last 5 years  he is only smoking half a pack a day.  Denies any illicit drug use including marijuana and  does not vape    He typically has a glass of wine a day or every other day-he does not recall drinking his usual daily glass of wine since the onset of his  dizziness/feeling off balance in the last month, no hx of etoh abuse or heavy use in the past       Therapies tried and outcome: None      Past Medical History:   Diagnosis Date    Actinic keratosis     BPH     Cervical stenosis of spinal canal 9/12/2014    Hyperlipidemia     Hypertension     IBS (irritable bowel syndrome)     Insomnia      Social History     Tobacco Use    Smoking status: Former     Packs/day: 0.50     Years: 10.00     Pack years: 5.00     Types: Cigarettes    Smokeless  tobacco: Never    Tobacco comments:     35 years    Substance Use Topics    Alcohol use: Yes     Alcohol/week: 0.0 standard drinks of alcohol     Comment: glass of wine per day or less       Current Outpatient Medications   Medication Sig Dispense Refill    cholecalciferol (VITAMIN D) 1000 UNIT tablet Take 1 tablet (1,000 Units) by mouth daily 100 tablet 3    Cyanocobalamin (B-12 PO) Take 400 mg by mouth      losartan (COZAAR) 50 MG tablet Take 1 tablet (50 mg) by mouth daily 90 tablet 3           MELOXICAM PO Take 7.5 mg by mouth daily       mometasone (NASONEX) 50 MCG/ACT nasal spray INSTILL 2 SPRAYS INTO BOTH NOSTRILS DAILY 17 g 1    MULTI-VITAMIN PO 1 Tablet every day      Omega-3 Fatty Acids (OMEGA-3 FISH OIL) 1200 MG CAPS Take by mouth 2 times daily      Psyllium (METAMUCIL PO) Take by mouth every morning      sildenafil (VIAGRA) 50 MG tablet 50 mg once daily as needed 1 hour before sexual activity; may be taken up to 4 hours before sexual activity 30 tablet 0    simvastatin (ZOCOR) 20 MG tablet Take 1 tablet (20 mg) by mouth At Bedtime 90 tablet 3    vitamin E (TOCOPHEROL) 200 units (90 mg) capsule Take 200 Units by mouth daily      Zinc Sulfate (ZINC 15 PO) Take by mouth three times a week       Allergies   Allergen Reactions    Lisinopril      Cough               ROS are negative, except as otherwise noted HPI      Objective    BP (!) 147/78 (BP Location: Left arm, Patient Position: Sitting, Cuff Size: Adult Regular)   Pulse 75   Temp 97.8  F (36.6  C) (Oral)   Resp 18   Wt 64.4 kg (141 lb 14.4 oz)   SpO2 100%   BMI 22.56 kg/m    Body mass index is 22.56 kg/m .    7/31/2023        2:27 PM   2:30 PM    2:31 PM   Orthostatic /77 170/79 156/86   BP Location Left arm Left arm Left arm   Patient Position Supine Sitting Standing   Cuff Size Adult Regular Adult Regular Adult Regular   Orthostatic Pulse 60 63 74     A reduction of 20 mmHg or more in systolic pressure  ?A reduction of 10 mmHg or more in  diastolic pressure      Physical Exam   GENERAL:  alert and mild fatigue   EYES: Eyes grossly normal to inspection, PERRL and EOMI, no nystagmus  denies blurry vision at present  and conjunctivae and sclerae normal  HENT: ear canals clear and TM's normal-no bulging of the eardrums or air-fluid levels,  nose mild clear rhinorrhea and mouth without ulcers or lesions, posterior pharynx clear  NECK: Supple, no adenopathy, no asymmetry, and thyroid normal to palpation  RESP: lungs clear to auscultation - no rales, rhonchi or wheezes  CV: regular rate and rhythm, no murmur, click or rub,  ABDOMEN: soft, nontender, no CVA tenderness bilaterally, and bowel sounds normal  MS: no gross musculoskeletal defects noted, no edema  NEURO: Normal strength and tone, mentation intact and speech normal  sensory exam grossly normal, cranial nerves 2-12 intact, DTR's normal and symmetric +2 at the knees, gait normal including heel/toe/tandem walking, Romberg normal, and rapid alternating movements normal  BACK:  no paralumbar tenderness  PSYCH: mentation appears normal, affect mildly anxious      Diagnostic Test Results:  Labs reviewed in Epic  Results for orders placed or performed in visit on 07/31/23   Comprehensive metabolic panel     Status: Abnormal   Result Value Ref Range    Sodium 138 136 - 145 mmol/L    Potassium 4.7 3.4 - 5.3 mmol/L    Chloride 102 98 - 107 mmol/L    Carbon Dioxide (CO2) 26 22 - 29 mmol/L    Anion Gap 10 7 - 15 mmol/L    Urea Nitrogen 9.9 8.0 - 23.0 mg/dL    Creatinine 0.85 0.67 - 1.17 mg/dL    Calcium 9.6 8.8 - 10.2 mg/dL    Glucose 110 (H) 70 - 99 mg/dL    Alkaline Phosphatase 80 40 - 129 U/L    AST 25 0 - 45 U/L    ALT 15 0 - 70 U/L    Protein Total 7.4 6.4 - 8.3 g/dL    Albumin 4.3 3.5 - 5.2 g/dL    Bilirubin Total 0.6 <=1.2 mg/dL    GFR Estimate 88 >60 mL/min/1.73m2   CBC with platelets and differential     Status: Abnormal   Result Value Ref Range    WBC Count 6.8 4.0 - 11.0 10e3/uL    RBC Count 4.23 (L)  4.40 - 5.90 10e6/uL    Hemoglobin 13.7 13.3 - 17.7 g/dL    Hematocrit 41.1 40.0 - 53.0 %    MCV 97 78 - 100 fL    MCH 32.4 26.5 - 33.0 pg    MCHC 33.3 31.5 - 36.5 g/dL    RDW 12.2 10.0 - 15.0 %    Platelet Count 238 150 - 450 10e3/uL    % Neutrophils 56 %    % Lymphocytes 29 %    % Monocytes 13 %    % Eosinophils 1 %    % Basophils 0 %    % Immature Granulocytes 0 %    Absolute Neutrophils 3.8 1.6 - 8.3 10e3/uL    Absolute Lymphocytes 2.0 0.8 - 5.3 10e3/uL    Absolute Monocytes 0.9 0.0 - 1.3 10e3/uL    Absolute Eosinophils 0.1 0.0 - 0.7 10e3/uL    Absolute Basophils 0.0 0.0 - 0.2 10e3/uL    Absolute Immature Granulocytes 0.0 <=0.4 10e3/uL   CBC with platelets and differential     Status: Abnormal    Narrative    The following orders were created for panel order CBC with platelets and differential.  Procedure                               Abnormality         Status                     ---------                               -----------         ------                     CBC with platelets and d...[[id number]]  Abnormal            Final result                 Please view results for these tests on the individual orders.              EKG Interpretation:      Interpreted by Roopa Newberry MD    Symptoms at time of EKG: -fatigued  Rhythm: Normal sinus   Rate: Normal and HR 62  Axis: Normal  Ectopy: None  Conduction: Normal  ST Segments/ T Waves: No ST-T wave changes and No acute ischemic changes  Q Waves: None  Comparison to prior: Unchanged from previous 1/14/2019    Clinical Impression: normal EKG

## 2023-07-31 NOTE — RESULT ENCOUNTER NOTE
Lab results CBC (Complete Blood Count)  was  normal.  The mild decrease in the RBC count is still consider within normal limits You do not have anemia due to your normal  hemoglobin and hematocrit.      Your other lab, the complete metabolic panel ( cmp ) is pending and should be available later this evening.       Please feel free to contact us with any questions or if you would like more information.

## 2023-07-31 NOTE — TELEPHONE ENCOUNTER
Patient was in urgent care today for falls/dizziness/headaches/palpitations. He was given a plan, but UC also advised a referral for a neurologist. They are calling to see if they can get a neurology referral. They were advise via [employer] that he will need an appointment.     Nursing support: Patient was assisted in making a follow up appointment as listed below.  They report that they are clear on when he needs to be assessed at the E.R. Thank you. Catia Waldron R.N.    Next 5 appointments (look out 90 days)      Aug 02, 2023  8:00 AM  (Arrive by 7:40 AM)  Provider Visit with Oumar Luke MD  [facility] ([facility] ) [address] [place] [address] [id number] [phone]     Sep 22, 2023  7:30 AM  (Arrive by 7:10 AM)  Welcome to Medicare Visit with Oumar Luke MD  [facility] ([facility] ) [address] [place] [phone] [phone]

## 2023-07-31 NOTE — PATIENT INSTRUCTIONS
Cardiology will contact you for an appointment -will look like a spam call on your phone    If increase in falls, headaches, changes in vision, chest pressure, heart racing, worsening light headed, weakness, to ER    Roopa Newberry MD     Emergency Dept, [facility]   [address] [place] [address]

## 2023-08-01 ENCOUNTER — MYC MEDICAL ADVICE (OUTPATIENT)
Dept: CARDIOLOGY | Facility: CLINIC | Age: 78
End: 2023-08-01
Payer: COMMERCIAL

## 2023-08-01 DIAGNOSIS — I10 ESSENTIAL HYPERTENSION WITH GOAL BLOOD PRESSURE LESS THAN 140/90: ICD-10-CM

## 2023-08-01 DIAGNOSIS — I95.1 ORTHOSTATIC HYPOTENSION: ICD-10-CM

## 2023-08-01 DIAGNOSIS — R06.02 SHORTNESS OF BREATH: Primary | ICD-10-CM

## 2023-08-01 DIAGNOSIS — E78.5 HYPERLIPIDEMIA LDL GOAL <130: ICD-10-CM

## 2023-08-01 NOTE — TELEPHONE ENCOUNTER
No cardiac records prior to upcoming appointment. Will update medication list at appointment on 08/04/23.    ANDRE Serrano   Cardiology Team  [facility]

## 2023-08-02 ENCOUNTER — VIRTUAL VISIT (OUTPATIENT)
Dept: FAMILY MEDICINE | Facility: CLINIC | Age: 78
End: 2023-08-02
Payer: COMMERCIAL

## 2023-08-02 ENCOUNTER — ANCILLARY PROCEDURE (OUTPATIENT)
Dept: CT IMAGING | Facility: CLINIC | Age: 78
End: 2023-08-02
Attending: INTERNAL MEDICINE
Payer: COMMERCIAL
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
## 2023-08-02 DIAGNOSIS — R42 DIZZINESS: ICD-10-CM

## 2023-08-02 DIAGNOSIS — R06.02 SOB (SHORTNESS OF BREATH): ICD-10-CM

## 2023-08-02 DIAGNOSIS — R42 DIZZINESS: Primary | ICD-10-CM

## 2023-08-02 PROCEDURE — 70450 CT HEAD/BRAIN W/O DYE: CPT | Mod: TC | Performed by: RADIOLOGY

## 2023-08-02 PROCEDURE — 99214 OFFICE O/P EST MOD 30 MIN: CPT | Mod: VID | Performed by: INTERNAL MEDICINE

## 2023-08-02 RX ORDER — MECLIZINE HYDROCHLORIDE 25 MG/1
25 TABLET ORAL 3 TIMES DAILY PRN
Qty: 30 TABLET | Refills: 0 | Status: SHIPPED | OUTPATIENT
Start: 2023-08-02 | End: 2024-05-31

## 2023-08-02 ASSESSMENT — PATIENT HEALTH QUESTIONNAIRE - PHQ9
SUM OF ALL RESPONSES TO PHQ QUESTIONS 1-9: 4
SUM OF ALL RESPONSES TO PHQ QUESTIONS 1-9: 4

## 2023-08-02 NOTE — PROGRESS NOTES
Matt is a 78 year old who is being evaluated via a billable video visit.      How would you like to obtain your AVS? MyChart  If the video visit is dropped, the invitation should be resent by: Send to e-mail at: [email or web address]  Will anyone else be joining your video visit? No          Assessment & Plan     Dizziness  Ongoing dizziness for the last few months however the last few weeks it has gotten worse  Dizziness worse whenever he gets up from sitting posture  Worse when he gets up from standing to sitting posture  Also was sometimes when he turns his body  Does not get worse when he looks up  It is transient  He does not have any vertigo symptoms but tells me symptoms are more like being on a boat  Recently it started getting worse  He even had few falls because of the dizziness   Never hit his head  Denies any palpitations  Went to  urgent care where the EKG was normal  I strongly feel the dizziness is from  some peripheral balance apparatus issues other than cardiac or neurological in origin  We will try meclizine  I have personally examined him and do some maneuvers to see if I can precipitate the dizziness as I strongly feel this is something on the lines of BPV  Also advised to check orthostatic blood pressures and report them to me and cardiologist  In the September I thought the dizziness could be some postural hypotension so advised to cut down his losartan to 50 mg but he has not noticed any change with that   CT Head w/o Contrast; Future  - meclizine (ANTIVERT) 25 MG tablet; Take 1 tablet (25 mg) by mouth 3 times daily as needed for dizziness    SOB (shortness of breath)  Recently getting more short of breath  Cannot anymore do stuff that he used to in the past  No pedal edema  No chest pains  Scheduled to see cardiologist on Friday  He is scheduled to get a BMP  Order echocardiogram  - Echocardiogram Complete; Future    Fall, initial encounter  He had 3 falls recently because of dizziness  All of  them happened when he was trying to get onto the steps  He just slowly slumped but never really hit his head  No tremors at rest but tremors when doing any activity  Denies any problems turning in bed  I do not see any masked facies  Unlikely that he has got anything like Parkinson's    Once he sees the cardiologist and gets all the work-up above we will regroup      30 minutes spent by me on the date of the encounter doing chart review, history and exam, documentation and further activities per the note           Oumar Luke MD  [facility] RAFAELA Gutierrez is a 78 year old, presenting for the following health issues:  No chief complaint on file.        8/2/2023     7:36 AM   Additional Questions   Roomed by Ari TOBIN   Accompanied by Virginia (Spouse)       History of Present Illness       Reason for visit:  Dizziness,frequent falls,higher than normal blood pressure, unbalanced,palpitations,    He eats 0-1 servings of fruits and vegetables daily.He consumes 0 sweetened beverage(s) daily.He exercises with enough effort to increase his heart rate 9 or less minutes per day.  He exercises with enough effort to increase his heart rate 3 or less days per week.   He is taking medications regularly.               Review of Systems   Constitutional, HEENT, cardiovascular, pulmonary, gi and gu systems are negative, except as otherwise noted.      Objective           Vitals:  No vitals were obtained today due to virtual visit.    Physical Exam   GENERAL: Healthy, alert and no distress  EYES: Eyes grossly normal to inspection.  No discharge or erythema, or obvious scleral/conjunctival abnormalities.  RESP: No audible wheeze, cough, or visible cyanosis.  No visible retractions or increased work of breathing.    SKIN: Visible skin clear. No significant rash, abnormal pigmentation or lesions.  NEURO: Cranial nerves grossly intact.  Mentation and speech appropriate for age.  PSYCH: Mentation appears  normal, affect normal/bright, judgement and insight intact, normal speech and appearance well-groomed.                Video-Visit Details    Type of service:  Video Visit   Video Start Time: 8:00AM  Video End Time: 830 AM    Originating Location (pt. Location): Home    Distant Location (provider location):  Off-site  Platform used for Video Visit: [facility]

## 2023-08-03 ENCOUNTER — TELEPHONE (OUTPATIENT)
Dept: GASTROENTEROLOGY | Facility: CLINIC | Age: 78
End: 2023-08-03

## 2023-08-03 NOTE — TELEPHONE ENCOUNTER
Pre visit planning completed.      Procedure details:    Patient scheduled for Colonoscopy  on 8/17/23.     Arrival time: 0935. Procedure time 1020    Pre op exam needed? N/A    Facility location: [facility]; [address], [place] [address]    Sedation type: Conscious sedation     Indication for procedure: Previous polyps       Chart review:     Electronic implanted devices? No    Diabetic? No      Medication review:    Anticoagulants? No    NSAIDS? Yes.  Meloxicam (Mobic).  Holding interval of 10 days.    Other medication HOLDING recommendations:  N/A      Prep for procedure:     Bowel prep recommendation: Sutab -- See scheduling encounter.   Due to:  patient request/preference.     Will send staff message to scoping provider regarding Sutab request. Upon chart review, Pt was seen for a visit yesterday for dizziness. ECHO ordered, Pt will see Cardiology on Friday. Will send message to Dr. Sarha to review.         Mary Ramírez RN  Endoscopy Procedure Pre Assessment RN  [phone] option 4

## 2023-08-04 ENCOUNTER — OFFICE VISIT (OUTPATIENT)
Dept: CARDIOLOGY | Facility: CLINIC | Age: 78
End: 2023-08-04
Attending: FAMILY MEDICINE
Payer: COMMERCIAL

## 2023-08-04 ENCOUNTER — MYC MEDICAL ADVICE (OUTPATIENT)
Dept: FAMILY MEDICINE | Facility: CLINIC | Age: 78
End: 2023-08-04

## 2023-08-04 ENCOUNTER — LAB (OUTPATIENT)
Dept: LAB | Facility: CLINIC | Age: 78
End: 2023-08-04
Payer: COMMERCIAL

## 2023-08-04 ENCOUNTER — ANCILLARY PROCEDURE (OUTPATIENT)
Dept: CARDIOLOGY | Facility: CLINIC | Age: 78
End: 2023-08-04
Attending: INTERNAL MEDICINE
Payer: COMMERCIAL

## 2023-08-04 VITALS
OXYGEN SATURATION: 97 % | DIASTOLIC BLOOD PRESSURE: 73 MMHG | BODY MASS INDEX: 22.93 KG/M2 | HEART RATE: 57 BPM | WEIGHT: 144.2 LBS | SYSTOLIC BLOOD PRESSURE: 159 MMHG

## 2023-08-04 DIAGNOSIS — R42 DIZZINESS: Primary | ICD-10-CM

## 2023-08-04 DIAGNOSIS — R26.89 LOSS OF BALANCE: ICD-10-CM

## 2023-08-04 DIAGNOSIS — E78.5 HYPERLIPIDEMIA LDL GOAL <130: ICD-10-CM

## 2023-08-04 DIAGNOSIS — I10 ESSENTIAL HYPERTENSION WITH GOAL BLOOD PRESSURE LESS THAN 140/90: Primary | ICD-10-CM

## 2023-08-04 DIAGNOSIS — R29.6 FALLS FREQUENTLY: ICD-10-CM

## 2023-08-04 DIAGNOSIS — R06.02 SHORTNESS OF BREATH: ICD-10-CM

## 2023-08-04 DIAGNOSIS — R42 DIZZINESS: ICD-10-CM

## 2023-08-04 DIAGNOSIS — I95.1 ORTHOSTATIC HYPOTENSION: ICD-10-CM

## 2023-08-04 LAB
ANION GAP SERPL CALCULATED.3IONS-SCNC: 8 MMOL/L (ref 7–15)
BUN SERPL-MCNC: 13.5 MG/DL (ref 8–23)
CALCIUM SERPL-MCNC: 9.5 MG/DL (ref 8.8–10.2)
CHLORIDE SERPL-SCNC: 106 MMOL/L (ref 98–107)
CREAT SERPL-MCNC: 0.9 MG/DL (ref 0.67–1.17)
DEPRECATED HCO3 PLAS-SCNC: 26 MMOL/L (ref 22–29)
GFR SERPL CREATININE-BSD FRML MDRD: 87 ML/MIN/1.73M2
GLUCOSE SERPL-MCNC: 94 MG/DL (ref 70–99)
MAGNESIUM SERPL-MCNC: 2.2 MG/DL (ref 1.7–2.3)
NT-PROBNP SERPL-MCNC: 167 PG/ML (ref 0–1800)
POTASSIUM SERPL-SCNC: 4.5 MMOL/L (ref 3.4–5.3)
SODIUM SERPL-SCNC: 140 MMOL/L (ref 136–145)

## 2023-08-04 PROCEDURE — 99205 OFFICE O/P NEW HI 60 MIN: CPT | Performed by: INTERNAL MEDICINE

## 2023-08-04 PROCEDURE — 80048 BASIC METABOLIC PNL TOTAL CA: CPT

## 2023-08-04 PROCEDURE — 83735 ASSAY OF MAGNESIUM: CPT

## 2023-08-04 PROCEDURE — 83880 ASSAY OF NATRIURETIC PEPTIDE: CPT

## 2023-08-04 PROCEDURE — 36415 COLL VENOUS BLD VENIPUNCTURE: CPT

## 2023-08-04 RX ORDER — SIMVASTATIN 20 MG
20 TABLET ORAL AT BEDTIME
Qty: 90 TABLET | Refills: 1 | Status: SHIPPED | OUTPATIENT
Start: 2023-08-04 | End: 2023-09-22

## 2023-08-04 NOTE — PATIENT INSTRUCTIONS
Take your medicines every day, as directed     Changes made today:  Complete echo as ordered by PCP  Wear Zio monitor for 14 days       Cardiology Care Coordinators:      Teresita MOORE RN     Cardiology Rooming staff:  Michi POWELL    Phone  [phone]      Fax [phone]    To Contact us     During Business Hours:  [phone]     If you are needing refills please contact your pharmacy.     For urgent after hour care please call the [facility] at [phone] or the [facility] at [phone] and ask to speak to the cardiologist on call.            HOW TO CHECK YOUR BLOOD PRESSURE AT HOME:     Avoid eating, smoking, and exercising for at least 30 minutes before taking a reading.     Be sure you have taken your BP medication at least 2-3 hours before you check it.      Sit quietly for 10 minutes before a reading.      Sit in a chair with your feet flat on the floor. Rest your  arm on a table so that the arm cuff is at the same level as your heart.     Remain still during the reading.  Record your blood pressure and pulse in a log and bring to your next appointment.       Use [employer] allows you to communicate directly with your heart team through secure messaging.  [employer] can be accessed any time on your phone, computer, or tablet.  If you need assistance, we'd be happy to help!             Keep your Heart Appointments:     Follow up depending on results

## 2023-08-04 NOTE — PATIENT INSTRUCTIONS
Patient has been prescribed a ZioPatch holter for 14 days.  Patient was instructed regarding the indication, function, care and prompt return of the ZioPatch holter monitor. The monitor, with S/N [id number],  was placed on the patient with instructions regarding care of the skin, electrodes, and monitor, as well as documentation in the patient diary. Patient demonstrated understanding of this information and agreed to call [employer] with further questions or concerns.     ANDRE Serrano   Cardiology Team  [facility]

## 2023-08-04 NOTE — TELEPHONE ENCOUNTER
Caller: Anupama Churchill   Reason for Reschedule/Cancellation (please be detailed, any staff messages or encounters to note?): PT WIFE CALLED TO CANCEL APPT. STATES PT IS ILL AND DOCTOR REQUESTED TO HAVE PROCEDURE CANCELLED.      Prior to reschedule please review:  Ordering Provider: FAZAL MONAE   Sedation per order: MODERATE  Does patient have any ASC Exclusions, please identify?: NO      Notes on Cancelled Procedure:  Procedure: Lower Endoscopy [Colonoscopy]   Date: 8/17/23  Location: [facility]; [address], [place] [address]  Surgeon: MALKA      Rescheduled: No

## 2023-08-04 NOTE — PROGRESS NOTES
Patient has been prescribed a ZioPatch holter for 14 days.  Patient was instructed regarding the indication, function, care and prompt return of the ZioPatch holter monitor. The monitor, with S/N [id number],  was placed on the patient with instructions regarding care of the skin, electrodes, and monitor, as well as documentation in the patient diary. Patient demonstrated understanding of this information and agreed to call [employer] with further questions or concerns.    ANDRE Serrano   Cardiology Team  [facility]

## 2023-08-04 NOTE — NURSING NOTE
Alonso Churchill's goals for this visit include:   Chief Complaint   Patient presents with    New Patient     Referred by Dr. Newberry  Orthostatic Hypotension - ongoing for 10 days, has fallen a couple of times        He requests these members of his care team be copied on today's visit information: yes     PCP: Oumar Luke    Referring Provider:  Roopa Newberry MD  [address] [place] [address]    BP (!) 159/73 (BP Location: Right arm, Patient Position: Chair, Cuff Size: Adult Regular)   Pulse 57   Wt 65.4 kg (144 lb 3.2 oz)   SpO2 97%   BMI 22.93 kg/m      Do you need any medication refills at today's visit? No     ANDRE Serrano   Cardiology Team  [facility]

## 2023-08-04 NOTE — TELEPHONE ENCOUNTER
Called the Pt to discuss below. No answer. Pt see's Cardiology today.     Mary Ramírez RN   Endoscopy Procedure Pre Assessment RN           Per Dr. Sarah:     Carl Sarah MD  P Mg Anes Review  Cc: Mary Ramírez RN  I would wait until they see cardiology to reschedule. Likely it will need to be postponed until after the echo though.    If cardiology clears and echo normal can proceed. If cardiology says echo no longer needed then also can proceed.    Shahram

## 2023-08-04 NOTE — PROGRESS NOTES
"[facility]  Advanced Heart Failure / Cardiology Clinic    Patient Name: Alonso Churchill   : 1945   Date of Visit: 2023    Primary Care Physician: Oumar Luke MD     Referring Physician: Dr. Luke  Reason for Visit: Dizziness and shortness of breath    Dear Dr. Luke,     I had the pleasure of seeing Alonso Churchill today in the [facility] Advanced Heart Failure / Cardiology clinic. As you know Alonso Churchill is a pleasant 78 year old year old male, who presents today for further evaluation of dizziness and shortness of breath.    Matt is a 78-year-old male with a history of hypertension, hyperlipidemia who is referred today for recurrent episodes of dizziness (loss of balance) as well as shortness of breath.     He has had dizziness (that he describes as \"a sensation of being out of balance\") for the last few months but it has gotten worse in the last 10 days.  This is worse when he gets up from a seated position but also when he turns his body but he is most concerned about the loss of balance when walking.     His symptoms are transient and he has declined symptoms suggestive of vertigo.  He has even had a few falls because of the loss of balance although he has never hit his head.  He reports the symptoms are more feeling out of balance rather than dizziness or presyncope.  He has not had any episodes of syncope.  He denies any palpitations recently .     He has had some transient headaches and memory is not good. He had some nausea earlier, and is still very tired. He has blurry vision which has also become worse recently and is getting an ophthalmology exam next month.     His losartan dose was previously reduced from 100 mg to 50 mg but he did not really try this lower dose as his prescription was not changed.      He is also noted increasing shortness of breath with decreased exercise capacity, primarily with walking up a flight of stairs. They live part of the " year in [place], where their house has 3 flights of stairs, and this year he noticed a bit more shortness of breath going up the stairs, but this has worsened over the last few months.      He denies chest pain, leg swelling, PND/orthopnea.    He went to an urgent care this Monday due to loss of balance, falls, blurry vision, headache.  In urgent care his EKG and CT head were normal.  He has been prescribed meclizine by his PCP and he does not think this has made much difference. He is now referred today for further cardiology evaluation.    Prior to all this, he was very active, works around their cabin, does yard work, rides his bike, and would go for walks frequently.    An echocardiogram and Holter monitor were ordered a couple days ago and they are yet to be scheduled.  He was scheduled for a colonoscopy which is now on hold because of his referral to cardiology.    Home blood pressures - he has checked them sitting and standing, and there is no evidence of orthostasis (BPs 110s-140s/60s-70s, HRs 60s-80s, without orthostatic changes)    ROS:  A complete 10-point ROS was negative except as above.    PAST MEDICAL HISTORY:  Past Medical History:   Diagnosis Date    Actinic keratosis     BPH     Cervical stenosis of spinal canal 9/12/2014    Hyperlipidemia     Hypertension     IBS (irritable bowel syndrome)     Insomnia        PAST SURGICAL HISTORY:  Past Surgical History:   Procedure Laterality Date    COLONOSCOPY WITH CO2 INSUFFLATION N/A 9/20/2018    Procedure: COLONOSCOPY WITH CO2 INSUFFLATION;  Colonoscopy;  Surgeon: Duane, William Charles, MD;  Location: [facility] OR    HEMORRHOIDECTOMY BANDING  2017    ROTATOR CUFF REPAIR RT/LT  3/2012    right  - dr. benavidez    SURGICAL HISTORY OF -   disc surgery    SURGICAL HISTORY OF -   back surgery       FAMILY HISTORY:  Family History   Problem Relation Age of Onset    Heart Disease Father     Cerebrovascular Disease Father     Prostate Cancer Brother         had surgery        SOCIAL HISTORY:  Social History     Socioeconomic History    Marital status:    Occupational History     Employer: RETIRED   Tobacco Use    Smoking status: Former     Packs/day: 0.50     Years: 10.00     Pack years: 5.00     Types: Cigarettes    Smokeless tobacco: Never    Tobacco comments:     35 years    Substance and Sexual Activity    Alcohol use: Yes     Alcohol/week: 0.0 standard drinks of alcohol     Comment: glass of wine per day or less    Drug use: No    Sexual activity: Yes     Partners: Female     Birth control/protection: Surgical, None   Other Topics Concern    Parent/sibling w/ CABG, MI or angioplasty before 65F 55M? No       MEDICATIONS:  He is taking losartan 100 mg once daily (not the 50 mg noted here)  Current Outpatient Medications   Medication Sig    cholecalciferol (VITAMIN D) 1000 UNIT tablet Take 1 tablet (1,000 Units) by mouth daily    Cyanocobalamin (B-12 PO) Take 400 mg by mouth    fexofenadine (ALLEGRA) 180 MG tablet Take 1 tablet (180 mg) by mouth daily    losartan (COZAAR) 100 MG tablet TAKE 1 TABLET BY MOUTH EVERY DAY    losartan (COZAAR) 50 MG tablet Take 1 tablet (50 mg) by mouth daily    meclizine (ANTIVERT) 25 MG tablet Take 1 tablet (25 mg) by mouth 3 times daily as needed for dizziness    MELOXICAM PO Take 7.5 mg by mouth daily     mometasone (NASONEX) 50 MCG/ACT nasal spray INSTILL 2 SPRAYS INTO BOTH NOSTRILS DAILY    MULTI-VITAMIN PO 1 Tablet every day    Omega-3 Fatty Acids (OMEGA-3 FISH OIL) 1200 MG CAPS Take by mouth 2 times daily    Psyllium (METAMUCIL PO) Take by mouth every morning    sildenafil (VIAGRA) 50 MG tablet 50 mg once daily as needed 1 hour before sexual activity; may be taken up to 4 hours before sexual activity    simvastatin (ZOCOR) 20 MG tablet TAKE 1 TABLET BY MOUTH EVERYDAY AT BEDTIME    vitamin E (TOCOPHEROL) 200 units (90 mg) capsule Take 200 Units by mouth daily    Zinc Sulfate (ZINC 15 PO) Take by mouth three times a week     No current  facility-administered medications for this visit.        ALLERGIES:     Allergies   Allergen Reactions    Lisinopril      Cough         PHYSICAL EXAM:  BP (!) 159/73 (BP Location: Right arm, Patient Position: Chair, Cuff Size: Adult Regular)   Pulse 57   Wt 65.4 kg (144 lb 3.2 oz)   SpO2 97%   BMI 22.93 kg/m      Orthostatic vitals today:  Lying down - blood pressure 178/80, pulse 62  Sitting - blood pressure 162/77, pulse 63  Standing - blood pressure 137/78, pulse 65  Standing - blood pressure 153/86, pulse 72      Gen: alert, interactive, NAD  HEENT: atraumatic, EOMI, MMM  Neck: supple, no JVD  CV: RRR, no m/r/g  Chest: CTAB, no wheezes or crackles  Abd: soft, NT, ND, +BS  Ext: no LE edema, 2+ peripheral pulses  Skin: warm and dry, no rashes on exposed surfaces  Neuro: alert and oriented, no focal deficits    LABS:    CMP  Last Comprehensive Metabolic Panel:  Sodium   Date Value Ref Range Status   07/31/2023 138 136 - 145 mmol/L Final   05/05/2021 141 133 - 144 mmol/L Final     Potassium   Date Value Ref Range Status   07/31/2023 4.7 3.4 - 5.3 mmol/L Final   09/23/2022 4.4 3.4 - 5.3 mmol/L Final   05/05/2021 4.3 3.4 - 5.3 mmol/L Final     Chloride   Date Value Ref Range Status   07/31/2023 102 98 - 107 mmol/L Final   09/23/2022 108 94 - 109 mmol/L Final   05/05/2021 109 94 - 109 mmol/L Final     Carbon Dioxide   Date Value Ref Range Status   05/05/2021 28 20 - 32 mmol/L Final     Carbon Dioxide (CO2)   Date Value Ref Range Status   07/31/2023 26 22 - 29 mmol/L Final   09/23/2022 27 20 - 32 mmol/L Final     Anion Gap   Date Value Ref Range Status   07/31/2023 10 7 - 15 mmol/L Final   09/23/2022 5 3 - 14 mmol/L Final   05/05/2021 4 3 - 14 mmol/L Final     Glucose   Date Value Ref Range Status   07/31/2023 110 (H) 70 - 99 mg/dL Final   09/23/2022 103 (H) 70 - 99 mg/dL Final   05/05/2021 104 (H) 70 - 99 mg/dL Final     Comment:     Fasting specimen     Urea Nitrogen   Date Value Ref Range Status   07/31/2023 9.9  8.0 - 23.0 mg/dL Final   09/23/2022 14 7 - 30 mg/dL Final   05/05/2021 16 7 - 30 mg/dL Final     Creatinine   Date Value Ref Range Status   07/31/2023 0.85 0.67 - 1.17 mg/dL Final   05/05/2021 0.92 0.66 - 1.25 mg/dL Final     GFR Estimate   Date Value Ref Range Status   07/31/2023 88 >60 mL/min/1.73m2 Final   05/05/2021 80 >60 mL/min/[1.73_m2] Final     Comment:     Non  GFR Calc  Starting 12/18/2018, serum creatinine based estimated GFR (eGFR) will be   calculated using the Chronic Kidney Disease Epidemiology Collaboration   (CKD-EPI) equation.       Calcium   Date Value Ref Range Status   07/31/2023 9.6 8.8 - 10.2 mg/dL Final   05/05/2021 8.8 8.5 - 10.1 mg/dL Final     Bilirubin Total   Date Value Ref Range Status   07/31/2023 0.6 <=1.2 mg/dL Final   05/05/2021 0.6 0.2 - 1.3 mg/dL Final     Alkaline Phosphatase   Date Value Ref Range Status   07/31/2023 80 40 - 129 U/L Final   05/05/2021 71 40 - 150 U/L Final     ALT   Date Value Ref Range Status   07/31/2023 15 0 - 70 U/L Final     Comment:     Reference intervals for this test were updated on 6/12/2023 to more accurately reflect our healthy population. There may be differences in the flagging of prior results with similar values performed with this method. Interpretation of those prior results can be made in the context of the updated reference intervals.     05/05/2021 28 0 - 70 U/L Final     AST   Date Value Ref Range Status   07/31/2023 25 0 - 45 U/L Final     Comment:     Reference intervals for this test were updated on 6/12/2023 to more accurately reflect our healthy population. There may be differences in the flagging of prior results with similar values performed with this method. Interpretation of those prior results can be made in the context of the updated reference intervals.   05/05/2021 21 0 - 45 U/L Final       NT-proBNP 167    TROP  No results found for: TROPI, TROPONIN, TROPR, TROPN    CBC  CBC RESULTS:   Recent Labs   Lab Test  "23  1510   WBC 6.8   RBC 4.23*   HGB 13.7   HCT 41.1   MCV 97   MCH 32.4   MCHC 33.3   RDW 12.2          LIPIDS  Recent Labs   Lab Test 22  0803 21  0757 16  0650 09/14/15  0730   CHOL 154 173   < > 182   HDL 82 82   < > 75   LDL 63 79   < > 95   TRIG 45 60   < > 58   CHOLHDLRATIO  --   --   --  2.4    < > = values in this interval not displayed.       TSH  TSH   Date Value Ref Range Status   2013 1.87 0.4 - 5.0 mU/L Final       HBA1C  No results found for: A1C      CARDIAC DATA:    EK2023: Heart rate 62 bpm normal sinus rhythm, normal ECG    Echo: Ordered by PCP, yet to be scheduled    Zio patch okay: Ordered by PCP, yet to be scheduled    Stress Test: None available      Cardiac MRI: None available      Cardiac Catheterization: None available    CT head:  2023  1.  No intracranial hemorrhage, mass, or definite CT evidence of recent ischemia.  2.  Age-related changes.    ASSESSMENT/PLAN:  In summary, Alonso Churchill is here today with the following -      1.  Dizziness that he primarily describes as \"loss of balance\" predominantly while walking with resultant falls, accompanied by blurred vision, headaches   2.  Progressive shortness of breath and exercise intolerance  3.  History of hypertension    Plans -  - Schedule echocardiogram  - Reorder the Zio patch    Mr. Churchill has recent onset of somewhat debilitating sensation of loss of balance primarily while walking that has led to falls.  This has been reported as dizziness although he tells me he does not really feel like he is lightheaded, presyncopal and has denied any syncopal episodes.  These episodes are frequently accompanied by blurred vision and headaches. He does not recall recent palpitations.  However he has noticed some fatigue and progressive exertional shortness of breath especially with stairs.  During his falls he has just felt out of balance and has slumped down to the floor without loss of " consciousness.     His constellation of presenting symptoms sound more likely to be from a neurologic or ENT etiology, however with the shortness of breath it is reasonable to rule out cardiac etiology.  I have suggested proceeding with scheduling the echocardiogram and we have reordered the Zio patch today.  If these results do not show a cardiac etiology, he would benefit from further evaluation with neurology and/ENT.  He is already scheduled to see an ophthalmologist next month.    He is scheduled for a colonoscopy next week but plans to hold off on proceeding with that at least until his cardiology testing returns.  If his cardiology testing is unremarkable, I do not see a cardiovascular contraindication to proceeding with this colonoscopy however he will merit from further evaluation for his loss of balance and falls with neurology/ENT.       I spent 60 minutes in care of the patient today including obtaining recent medical history, personally reviewing recent cardiac testing and/or lab results, today's examination, discussion of testing results and care recommendations with patient.       Thank you for the opportunity to participate in this patient's care. Please feel free to reach out with any questions or concerns.      Shree Gomes MD, [facility]  Associate Professor of Medicine  Advanced Heart Failure, LVAD & Cardiac Transplant  Director, Cardio-Obstetrics  Cardio-Oncology  [facility]

## 2023-08-07 NOTE — TELEPHONE ENCOUNTER
Called the Pt to discuss below. No answer, Left message.     Colonoscopy may have already been discussed with Cardiology (see note).   Want to confirm with the Pt they are aware of plan.     Mary Ramírez RN   Endoscopy Procedure Pre Assessment RN

## 2023-08-10 NOTE — TELEPHONE ENCOUNTER
Zio patch is in place, Cardiology is still recommending an ECHO. ECHO is scheduled for 8/17. Colonoscopy has been cancelled. Nothing further needed.     Mary Ramírez, RN   Endoscopy Procedure Pre Assessment RN

## 2023-08-17 ENCOUNTER — ANCILLARY PROCEDURE (OUTPATIENT)
Dept: CARDIOLOGY | Facility: CLINIC | Age: 78
End: 2023-08-17
Attending: INTERNAL MEDICINE
Payer: COMMERCIAL

## 2023-08-17 DIAGNOSIS — R06.02 SOB (SHORTNESS OF BREATH): ICD-10-CM

## 2023-08-17 LAB — LVEF ECHO: NORMAL

## 2023-08-17 PROCEDURE — 93306 TTE W/DOPPLER COMPLETE: CPT | Performed by: INTERNAL MEDICINE

## 2023-08-25 ENCOUNTER — TRANSFERRED RECORDS (OUTPATIENT)
Dept: HEALTH INFORMATION MANAGEMENT | Facility: CLINIC | Age: 78
End: 2023-08-25
Payer: COMMERCIAL

## 2023-08-25 LAB — TSH SERPL-ACNC: 2.32 MIU/L (ref 0.4–4.5)

## 2023-09-01 ENCOUNTER — TRANSFERRED RECORDS (OUTPATIENT)
Dept: HEALTH INFORMATION MANAGEMENT | Facility: CLINIC | Age: 78
End: 2023-09-01

## 2023-09-22 ENCOUNTER — OFFICE VISIT (OUTPATIENT)
Dept: FAMILY MEDICINE | Facility: CLINIC | Age: 78
End: 2023-09-22
Payer: COMMERCIAL

## 2023-09-22 VITALS
OXYGEN SATURATION: 98 % | HEART RATE: 70 BPM | DIASTOLIC BLOOD PRESSURE: 90 MMHG | WEIGHT: 149 LBS | SYSTOLIC BLOOD PRESSURE: 140 MMHG | TEMPERATURE: 98 F | BODY MASS INDEX: 23.39 KG/M2 | RESPIRATION RATE: 20 BRPM | HEIGHT: 67 IN

## 2023-09-22 DIAGNOSIS — I10 ESSENTIAL HYPERTENSION WITH GOAL BLOOD PRESSURE LESS THAN 140/90: ICD-10-CM

## 2023-09-22 DIAGNOSIS — R42 DIZZINESS: ICD-10-CM

## 2023-09-22 DIAGNOSIS — E78.5 HYPERLIPIDEMIA LDL GOAL <130: ICD-10-CM

## 2023-09-22 DIAGNOSIS — Z00.00 ENCOUNTER FOR MEDICARE ANNUAL WELLNESS EXAM: Primary | ICD-10-CM

## 2023-09-22 DIAGNOSIS — Z12.11 COLON CANCER SCREENING: ICD-10-CM

## 2023-09-22 DIAGNOSIS — Z12.5 SCREENING FOR PROSTATE CANCER: ICD-10-CM

## 2023-09-22 LAB — PSA SERPL DL<=0.01 NG/ML-MCNC: 3.83 NG/ML (ref 0–6.5)

## 2023-09-22 PROCEDURE — G0439 PPPS, SUBSEQ VISIT: HCPCS | Performed by: INTERNAL MEDICINE

## 2023-09-22 PROCEDURE — 36415 COLL VENOUS BLD VENIPUNCTURE: CPT | Performed by: INTERNAL MEDICINE

## 2023-09-22 PROCEDURE — 99214 OFFICE O/P EST MOD 30 MIN: CPT | Mod: 25 | Performed by: INTERNAL MEDICINE

## 2023-09-22 PROCEDURE — 90662 IIV NO PRSV INCREASED AG IM: CPT | Performed by: INTERNAL MEDICINE

## 2023-09-22 PROCEDURE — G0103 PSA SCREENING: HCPCS | Performed by: INTERNAL MEDICINE

## 2023-09-22 PROCEDURE — G0008 ADMIN INFLUENZA VIRUS VAC: HCPCS | Performed by: INTERNAL MEDICINE

## 2023-09-22 RX ORDER — SIMVASTATIN 20 MG
20 TABLET ORAL AT BEDTIME
Qty: 90 TABLET | Refills: 3 | Status: SHIPPED | OUTPATIENT
Start: 2023-09-22 | End: 2024-09-26

## 2023-09-22 RX ORDER — LOSARTAN POTASSIUM 50 MG/1
50 TABLET ORAL DAILY
Qty: 90 TABLET | Refills: 3 | Status: SHIPPED | OUTPATIENT
Start: 2023-09-22 | End: 2024-09-26

## 2023-09-22 ASSESSMENT — ENCOUNTER SYMPTOMS
SHORTNESS OF BREATH: 1
CONSTIPATION: 0
PALPITATIONS: 0
DYSURIA: 0
FREQUENCY: 0
HEARTBURN: 0
JOINT SWELLING: 0
MYALGIAS: 0
DIZZINESS: 1
NERVOUS/ANXIOUS: 0
ABDOMINAL PAIN: 0
DIARRHEA: 0
ARTHRALGIAS: 1
FEVER: 0
PARESTHESIAS: 0
WEAKNESS: 0
SORE THROAT: 0
EYE PAIN: 0
NAUSEA: 0
CHILLS: 0
HEADACHES: 0
COUGH: 0
HEMATOCHEZIA: 0
HEMATURIA: 0

## 2023-09-22 ASSESSMENT — PAIN SCALES - GENERAL: PAINLEVEL: NO PAIN (0)

## 2023-09-22 ASSESSMENT — ACTIVITIES OF DAILY LIVING (ADL): CURRENT_FUNCTION: NO ASSISTANCE NEEDED

## 2023-09-22 NOTE — PROGRESS NOTES
"SUBJECTIVE:   Matt is a 78 year old who presents for Preventive Visit.      9/22/2023     7:11 AM   Additional Questions   Roomed by tessie   Accompanied by wife Elisabet         9/22/2023     7:11 AM   Patient Reported Additional Medications   Patient reports taking the following new medications none       Are you in the first 12 months of your Medicare coverage?  No    Healthy Habits:     In general, how would you rate your overall health?  Fair    Frequency of exercise:  1 day/week    Duration of exercise:  Less than 15 minutes    Do you usually eat at least 4 servings of fruit and vegetables a day, include whole grains    & fiber and avoid regularly eating high fat or \"junk\" foods?  No    Taking medications regularly:  Yes    Medication side effects:  None    Ability to successfully perform activities of daily living:  No assistance needed    Home Safety:  No safety concerns identified    Hearing Impairment:  Difficulty following a conversation in a noisy restaurant or crowded room, feel that people are mumbling or not speaking clearly, need to ask people to speak up or repeat themselves and difficulty understanding speech on the telephone    In the past 6 months, have you been bothered by leaking of urine?  No    In general, how would you rate your overall mental or emotional health?  Good    Additional concerns today:  Yes      Today's PHQ-2 Score:       9/22/2023     7:06 AM   PHQ-2 ( 1999 [employer])   Q1: Little interest or pleasure in doing things 0   Q2: Feeling down, depressed or hopeless 0   PHQ-2 Score 0   Q1: Little interest or pleasure in doing things Not at all   Q2: Feeling down, depressed or hopeless Not at all   PHQ-2 Score 0           Have you ever done Advance Care Planning? (For example, a Health Directive, POLST, or a discussion with a medical provider or your loved ones about your wishes): Yes, advance care planning is on file.       Fall risk  Fallen 2 or more times in the past year?: Yes  Any fall " with injury in the past year?: No    Cognitive Screening   1) Repeat 3 items (Leader, Season, Table)    2) Clock draw: NORMAL  3) 3 item recall: Recalls 2 objects   Results: NORMAL clock, 1-2 items recalled: COGNITIVE IMPAIRMENT LESS LIKELY    Mini-CogTM Copyright NICKI Daniel. Licensed by the author for use in [facility]; reprinted with permission (nima@[facility]). All rights reserved.      Do you have sleep apnea, excessive snoring or daytime drowsiness? : no    Reviewed and updated as needed this visit by clinical staff   Tobacco  Allergies  Meds              Reviewed and updated as needed this visit by Provider                 Social History     Tobacco Use    Smoking status: Former     Packs/day: 0.50     Years: 10.00     Pack years: 5.00     Types: Cigarettes    Smokeless tobacco: Never    Tobacco comments:     35 years    Substance Use Topics    Alcohol use: Yes     Alcohol/week: 0.0 standard drinks of alcohol     Comment: glass of wine per day or less             9/22/2023     7:06 AM   Alcohol Use   Prescreen: >3 drinks/day or >7 drinks/week? No     Do you have a current opioid prescription? No  Do you use any other controlled substances or medications that are not prescribed by a provider? None            Current providers sharing in care for this patient include:   Patient Care Team:  Oumar Luke MD as PCP - General (Internal Medicine)  Shree Gomes MD as MD (Advanced Heart Failure and Transplant Cardiology)  Oumar Luke MD as Assigned PCP  Shree Gomes MD as Assigned Heart and Vascular Provider    The following health maintenance items are reviewed in Epic and correct as of today:  Health Maintenance   Topic Date Due    COVID-19 Vaccine (1) Never done    MEDICARE ANNUAL WELLNESS VISIT  09/22/2024    ANNUAL REVIEW OF HM ORDERS  09/22/2024    FALL RISK ASSESSMENT  09/22/2024    DTAP/TDAP/TD IMMUNIZATION (4 - Td or Tdap) 09/15/2026    LIPID  09/23/2027    ADVANCE CARE  "PLANNING  09/22/2028    PHQ-2 (once per calendar year)  Completed    INFLUENZA VACCINE  Completed    Pneumococcal Vaccine: 65+ Years  Completed    ZOSTER IMMUNIZATION  Completed    IPV IMMUNIZATION  Aged Out    HPV IMMUNIZATION  Aged Out    MENINGITIS IMMUNIZATION  Aged Out    HEPATITIS C SCREENING  Discontinued    COLORECTAL CANCER SCREENING  Discontinued    LUNG CANCER SCREENING  Discontinued               Review of Systems   Constitutional:  Negative for chills and fever.   HENT:  Positive for hearing loss. Negative for congestion, ear pain and sore throat.    Eyes:  Positive for visual disturbance. Negative for pain.   Respiratory:  Positive for shortness of breath. Negative for cough.    Cardiovascular:  Negative for chest pain, palpitations and peripheral edema.   Gastrointestinal:  Negative for abdominal pain, constipation, diarrhea, heartburn, hematochezia and nausea.   Genitourinary:  Positive for impotence. Negative for dysuria, frequency, genital sores, hematuria, penile discharge and urgency.   Musculoskeletal:  Positive for arthralgias. Negative for joint swelling and myalgias.   Skin:  Negative for rash.   Neurological:  Positive for dizziness. Negative for weakness, headaches and paresthesias.   Psychiatric/Behavioral:  Negative for mood changes. The patient is not nervous/anxious.          OBJECTIVE:   BP (!) 140/90   Pulse 70   Temp 98  F (36.7  C) (Oral)   Resp 20   Ht 1.689 m (5' 6.5\")   Wt 67.6 kg (149 lb)   SpO2 98%   BMI 23.69 kg/m   Estimated body mass index is 23.69 kg/m  as calculated from the following:    Height as of this encounter: 1.689 m (5' 6.5\").    Weight as of this encounter: 67.6 kg (149 lb).  Physical Exam  GENERAL: healthy, alert and no distress  EYES: Eyes grossly normal to inspection, PERRL and conjunctivae and sclerae normal  HENT: ear canals and TM's normal, nose and mouth without ulcers or lesions  NECK: no adenopathy, no asymmetry, masses, or scars and thyroid " normal to palpation  RESP: lungs clear to auscultation - no rales, rhonchi or wheezes  CV: regular rate and rhythm, normal S1 S2, no S3 or S4, no murmur, click or rub, no peripheral edema and peripheral pulses strong  ABDOMEN: soft, nontender, no hepatosplenomegaly, no masses and bowel sounds normal  MS: no gross musculoskeletal defects noted, no edema  SKIN: Multiple seborrheic keratosis noted on the back  NEURO: Normal strength and tone, mentation intact and speech normal  PSYCH: mentation appears normal, affect normal/bright    Diagnostic Test Results:  Labs reviewed in Epic    ASSESSMENT / PLAN:   (Z00.00) Encounter for Medicare annual wellness exam  (primary encounter diagnosis)  Comment: Up-to-date with all immunizations  Exercise regularly  He had a colonoscopy in 2018  He is due for repeat colonoscopy now  We will check PSA today  Plan: OFFICE/OUTPT VISIT,EST,LEVL IV            (I10) Essential hypertension with goal blood pressure less than 140/90  Comment: I have reviewed blood pressure recordings from [facility]  For most part they are in the range of 120/80  Continue the current dose of Cozaar without any change  Plan: losartan (COZAAR) 50 MG tablet, OFFICE/OUTPT         VISIT,EST,LEVL IV            (E78.5) Hyperlipidemia LDL goal <130  Comment:   Plan: simvastatin (ZOCOR) 20 MG tablet        Lipid panel last year was normal    (Z12.5) Screening for prostate cancer  Comment:   Plan: PSA, screen            (R42) Dizziness  Comment: He has ongoing problems with dizziness  He was evaluated extensively by neurology and cardiology  They think the dizziness is peripheral in origin  [facility]  Plan: OFFICE/OUTPT VISIT,EST,LEVL IV            Patient has been advised of split billing requirements and indicates understanding: No      COUNSELING:  Reviewed preventive health counseling, as reflected in patient instructions       Regular exercise       Healthy diet/nutrition       Vision  screening       Hearing screening       Dental care       Bladder control       Fall risk prevention       Colon cancer screening       Prostate cancer screening        He reports that he has quit smoking. His smoking use included cigarettes. He has a 5.00 pack-year smoking history. He has never used smokeless tobacco.      Appropriate preventive services were discussed with this patient, including applicable screening as appropriate for cardiovascular disease, diabetes, osteopenia/osteoporosis, and glaucoma.  As appropriate for age/gender, discussed screening for colorectal cancer, prostate cancer, breast cancer, and cervical cancer. Checklist reviewing preventive services available has been given to the patient.    Reviewed patients plan of care and provided an AVS. The Basic Care Plan (routine screening as documented in Health Maintenance) for Alonso meets the Care Plan requirement. This Care Plan has been established and reviewed with the Patient.          Oumar Luke MD  [facility]    Identified Health Risks:  I have reviewed Opioid Use Disorder and Substance Use Disorder risk factors and made any needed referrals.

## 2023-09-22 NOTE — PATIENT INSTRUCTIONS
Patient Education   Personalized Prevention Plan  You are due for the preventive services outlined below.  Your care team is available to assist you in scheduling these services.  If you have already completed any of these items, please share that information with your care team to update in your medical record.  Health Maintenance Due   Topic Date Due     COVID-19 Vaccine (1) Never done     Flu Vaccine (1) 09/01/2023     Annual Wellness Visit  09/23/2023     ANNUAL REVIEW OF HM ORDERS  09/23/2023     FALL RISK ASSESSMENT  09/23/2023

## 2023-10-11 ENCOUNTER — TRANSFERRED RECORDS (OUTPATIENT)
Dept: HEALTH INFORMATION MANAGEMENT | Facility: CLINIC | Age: 78
End: 2023-10-11
Payer: COMMERCIAL

## 2023-10-12 ENCOUNTER — TRANSFERRED RECORDS (OUTPATIENT)
Dept: HEALTH INFORMATION MANAGEMENT | Facility: CLINIC | Age: 78
End: 2023-10-12
Payer: COMMERCIAL

## 2023-12-15 ENCOUNTER — TELEPHONE (OUTPATIENT)
Dept: FAMILY MEDICINE | Facility: CLINIC | Age: 78
End: 2023-12-15
Payer: COMMERCIAL

## 2023-12-15 NOTE — TELEPHONE ENCOUNTER
Patient Quality Outreach    Patient is due for the following:   Hypertension -  BP check      Topic Date Due    COVID-19 Vaccine (1) Never done       Next Steps:   Schedule a nurse only visit for Blood Pressure recheck and Immunization    Type of outreach:    Sent [employer] message.      Questions for provider review:    None           Lauren Bee

## 2024-04-06 ENCOUNTER — TRANSFERRED RECORDS (OUTPATIENT)
Dept: HEALTH INFORMATION MANAGEMENT | Facility: CLINIC | Age: 79
End: 2024-04-06

## 2024-05-31 ENCOUNTER — OFFICE VISIT (OUTPATIENT)
Dept: FAMILY MEDICINE | Facility: CLINIC | Age: 79
End: 2024-05-31
Payer: COMMERCIAL

## 2024-05-31 VITALS
BODY MASS INDEX: 23.23 KG/M2 | SYSTOLIC BLOOD PRESSURE: 140 MMHG | RESPIRATION RATE: 16 BRPM | HEART RATE: 89 BPM | TEMPERATURE: 98.2 F | HEIGHT: 67 IN | DIASTOLIC BLOOD PRESSURE: 90 MMHG | WEIGHT: 148 LBS | OXYGEN SATURATION: 99 %

## 2024-05-31 DIAGNOSIS — E78.5 HYPERLIPIDEMIA LDL GOAL <130: ICD-10-CM

## 2024-05-31 DIAGNOSIS — R53.83 OTHER FATIGUE: ICD-10-CM

## 2024-05-31 DIAGNOSIS — M84.48XA PATHOLOGICAL FRACTURE OF ONE RIB: ICD-10-CM

## 2024-05-31 DIAGNOSIS — N52.9 ERECTILE DYSFUNCTION, UNSPECIFIED ERECTILE DYSFUNCTION TYPE: ICD-10-CM

## 2024-05-31 DIAGNOSIS — I10 ESSENTIAL HYPERTENSION WITH GOAL BLOOD PRESSURE LESS THAN 140/90: ICD-10-CM

## 2024-05-31 DIAGNOSIS — R42 DIZZINESS: Primary | ICD-10-CM

## 2024-05-31 DIAGNOSIS — M85.89 OSTEOPENIA OF MULTIPLE SITES: ICD-10-CM

## 2024-05-31 DIAGNOSIS — R68.82 LOW LIBIDO: ICD-10-CM

## 2024-05-31 LAB — SHBG SERPL-SCNC: 84 NMOL/L (ref 11–80)

## 2024-05-31 PROCEDURE — 80061 LIPID PANEL: CPT | Performed by: INTERNAL MEDICINE

## 2024-05-31 PROCEDURE — 84443 ASSAY THYROID STIM HORMONE: CPT | Performed by: INTERNAL MEDICINE

## 2024-05-31 PROCEDURE — 36415 COLL VENOUS BLD VENIPUNCTURE: CPT | Performed by: INTERNAL MEDICINE

## 2024-05-31 PROCEDURE — 84270 ASSAY OF SEX HORMONE GLOBUL: CPT | Performed by: INTERNAL MEDICINE

## 2024-05-31 PROCEDURE — 99214 OFFICE O/P EST MOD 30 MIN: CPT | Performed by: INTERNAL MEDICINE

## 2024-05-31 PROCEDURE — 84403 ASSAY OF TOTAL TESTOSTERONE: CPT | Performed by: INTERNAL MEDICINE

## 2024-05-31 RX ORDER — RESPIRATORY SYNCYTIAL VIRUS VACCINE 120MCG/0.5
0.5 KIT INTRAMUSCULAR ONCE
Qty: 1 EACH | Refills: 0 | Status: CANCELLED | OUTPATIENT
Start: 2024-05-31 | End: 2024-05-31

## 2024-05-31 ASSESSMENT — PAIN SCALES - GENERAL: PAINLEVEL: NO PAIN (0)

## 2024-05-31 NOTE — PROGRESS NOTES
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  He is doing some exercises at home and that has improved    Pathological fracture of one rib  He was recently coughing a lot after a bout of bronchitis  Suddenly started having severe pain in the left side of his chest  He went to the ER and they found that he has a fracture  of the 11th rib posteriorly on the left side  It is concerning that he did not have any trauma and just with coughing he broke the rib  A DEXA scan did show he has osteopenia but no osteoporosis  In the light of DEXA scan only showing osteopenia and he having a fracture I am inclined to get a bone scan to make sure that he does not have any lesions like osteolytic lesions causing this fracture  - NM Bone Scan Whole Body; Future    Other fatigue  Complaining of fatigue  In the light of he is having osteopenia and a rib fracture I think is reasonable to check for testosterone because of the fatigue as well  He has no libido whatsoever  Not been sexually active for almost 1 year  - TSH with free T4 reflex; Future  - Testosterone Free and Total; Future  - TSH with free T4 reflex  - Testosterone Free and Total    Low libido  As above  - Testosterone Free and Total; Future  - Testosterone Free and Total    Osteopenia of multiple sites  DEXA scan showed osteopenia but no osteoporosis  He was placed on calcium and vitamin D supplements and was advised to take Fosamax but he is not keen due to the side effect profile  I agree that we should hold off on Fosamax for now until we get all the other data and after that if need be we can refer him to endocrinology    Erectile dysfunction, unspecified erectile dysfunction type    - Testosterone Free and  "Total; Future  - Testosterone Free and Total    Hyperlipidemia LDL goal <130    - Lipid panel reflex to direct LDL Fasting; Future  - Lipid panel reflex to direct LDL Fasting    Essential hypertension with goal blood pressure less than 140/90  Blood pressure still elevated today with 140 systolic and 90 diastolic but he tells me blood pressures are much better at home  Continue losartan                Subjective   Matt is a 79 year old, presenting for the following health issues:  Follow Up        5/31/2024     6:57 AM   Additional Questions   Roomed by jonathon   Accompanied by self     History of Present Illness       Reason for visit:  Dizziness issue and  a broke a rib in April    He eats 2-3 servings of fruits and vegetables daily.He consumes 0 sweetened beverage(s) daily.He exercises with enough effort to increase his heart rate 9 or less minutes per day.  He exercises with enough effort to increase his heart rate 3 or less days per week.          Discuss dizziness and broken ribs- April 4th 2024   Patient stated he is still having dizziness            Review of Systems  Constitutional, HEENT, cardiovascular, pulmonary, gi and gu systems are negative, except as otherwise noted.      Objective    BP (!) 140/90   Pulse 89   Temp 98.2  F (36.8  C) (Oral)   Resp 16   Ht 1.689 m (5' 6.5\")   Wt 67.1 kg (148 lb)   SpO2 99%   BMI 23.53 kg/m    Body mass index is 23.53 kg/m .  Physical Exam   GENERAL: alert and no distress  EYES: Eyes grossly normal to inspection, PERRL and conjunctivae and sclerae normal  HENT: ear canals and TM's normal, nose and mouth without ulcers or lesions  NECK: no adenopathy, no asymmetry, masses, or scars  RESP: lungs clear to auscultation - no rales, rhonchi or wheezes  CV: regular rate and rhythm, normal S1 S2, no S3 or S4, no murmur, click or rub, no peripheral edema  MS: no gross musculoskeletal defects noted, no edema  SKIN: no suspicious lesions or rashes  NEURO: Normal strength " and tone, mentation intact and speech normal  PSYCH: mentation appears normal, affect normal/bright      30 minutes spent by me on the date of the encounter doing chart review, history and exam, documentation and further activities per the note        Signed Electronically by: Oumar Luke MD

## 2024-06-01 LAB
CHOLEST SERPL-MCNC: 154 MG/DL
FASTING STATUS PATIENT QL REPORTED: YES
HDLC SERPL-MCNC: 76 MG/DL
LDLC SERPL CALC-MCNC: 69 MG/DL
NONHDLC SERPL-MCNC: 78 MG/DL
TRIGL SERPL-MCNC: 43 MG/DL
TSH SERPL DL<=0.005 MIU/L-ACNC: 2.29 UIU/ML (ref 0.3–4.2)

## 2024-06-05 LAB
TESTOST FREE SERPL-MCNC: 4.69 NG/DL
TESTOST SERPL-MCNC: 449 NG/DL (ref 240–950)

## 2024-06-19 ENCOUNTER — ANCILLARY PROCEDURE (OUTPATIENT)
Dept: NUCLEAR MEDICINE | Facility: CLINIC | Age: 79
End: 2024-06-19
Attending: INTERNAL MEDICINE
Payer: COMMERCIAL

## 2024-06-19 DIAGNOSIS — M84.48XA PATHOLOGICAL FRACTURE OF ONE RIB: ICD-10-CM

## 2024-06-19 PROCEDURE — A9503 TC99M MEDRONATE: HCPCS | Performed by: RADIOLOGY

## 2024-06-19 PROCEDURE — 78306 BONE IMAGING WHOLE BODY: CPT | Mod: GC | Performed by: RADIOLOGY

## 2024-06-19 RX ORDER — TC 99M MEDRONATE 20 MG/10ML
20-30 INJECTION, POWDER, LYOPHILIZED, FOR SOLUTION INTRAVENOUS ONCE
Status: COMPLETED | OUTPATIENT
Start: 2024-06-19 | End: 2024-06-19

## 2024-06-19 RX ADMIN — TC 99M MEDRONATE 24.7 MILLICURIE: 20 INJECTION, POWDER, LYOPHILIZED, FOR SOLUTION INTRAVENOUS at 09:14

## 2024-09-24 ASSESSMENT — SOCIAL DETERMINANTS OF HEALTH (SDOH): HOW OFTEN DO YOU GET TOGETHER WITH FRIENDS OR RELATIVES?: ONCE A WEEK

## 2024-09-26 ENCOUNTER — OFFICE VISIT (OUTPATIENT)
Dept: FAMILY MEDICINE | Facility: CLINIC | Age: 79
End: 2024-09-26
Payer: COMMERCIAL

## 2024-09-26 ENCOUNTER — MYC MEDICAL ADVICE (OUTPATIENT)
Dept: FAMILY MEDICINE | Facility: CLINIC | Age: 79
End: 2024-09-26

## 2024-09-26 VITALS
SYSTOLIC BLOOD PRESSURE: 136 MMHG | BODY MASS INDEX: 23.59 KG/M2 | WEIGHT: 146.8 LBS | OXYGEN SATURATION: 96 % | HEIGHT: 66 IN | RESPIRATION RATE: 14 BRPM | HEART RATE: 93 BPM | DIASTOLIC BLOOD PRESSURE: 80 MMHG | TEMPERATURE: 98.3 F

## 2024-09-26 DIAGNOSIS — Z12.5 SCREENING FOR PROSTATE CANCER: ICD-10-CM

## 2024-09-26 DIAGNOSIS — Z00.00 MEDICARE ANNUAL WELLNESS VISIT, SUBSEQUENT: ICD-10-CM

## 2024-09-26 DIAGNOSIS — Z12.11 COLON CANCER SCREENING: ICD-10-CM

## 2024-09-26 DIAGNOSIS — R31.0 GROSS HEMATURIA: ICD-10-CM

## 2024-09-26 DIAGNOSIS — I10 ESSENTIAL HYPERTENSION WITH GOAL BLOOD PRESSURE LESS THAN 140/90: Primary | ICD-10-CM

## 2024-09-26 DIAGNOSIS — E78.5 HYPERLIPIDEMIA LDL GOAL <130: ICD-10-CM

## 2024-09-26 LAB
ALBUMIN UR-MCNC: NEGATIVE MG/DL
ALT SERPL W P-5'-P-CCNC: 15 U/L (ref 0–70)
ANION GAP SERPL CALCULATED.3IONS-SCNC: 9 MMOL/L (ref 7–15)
APPEARANCE UR: CLEAR
BACTERIA #/AREA URNS HPF: ABNORMAL /HPF
BASOPHILS # BLD AUTO: 0 10E3/UL (ref 0–0.2)
BASOPHILS NFR BLD AUTO: 1 %
BILIRUB UR QL STRIP: NEGATIVE
BUN SERPL-MCNC: 17.7 MG/DL (ref 8–23)
CALCIUM SERPL-MCNC: 9.4 MG/DL (ref 8.8–10.4)
CHLORIDE SERPL-SCNC: 104 MMOL/L (ref 98–107)
CHOLEST SERPL-MCNC: 175 MG/DL
COLOR UR AUTO: YELLOW
CREAT SERPL-MCNC: 0.94 MG/DL (ref 0.67–1.17)
EGFRCR SERPLBLD CKD-EPI 2021: 82 ML/MIN/1.73M2
EOSINOPHIL # BLD AUTO: 0.1 10E3/UL (ref 0–0.7)
EOSINOPHIL NFR BLD AUTO: 2 %
ERYTHROCYTE [DISTWIDTH] IN BLOOD BY AUTOMATED COUNT: 13.1 % (ref 10–15)
FASTING STATUS PATIENT QL REPORTED: YES
FASTING STATUS PATIENT QL REPORTED: YES
GLUCOSE SERPL-MCNC: 116 MG/DL (ref 70–99)
GLUCOSE UR STRIP-MCNC: NEGATIVE MG/DL
HCO3 SERPL-SCNC: 26 MMOL/L (ref 22–29)
HCT VFR BLD AUTO: 43.4 % (ref 40–53)
HDLC SERPL-MCNC: 74 MG/DL
HGB BLD-MCNC: 14.2 G/DL (ref 13.3–17.7)
HGB UR QL STRIP: ABNORMAL
IMM GRANULOCYTES # BLD: 0 10E3/UL
IMM GRANULOCYTES NFR BLD: 0 %
KETONES UR STRIP-MCNC: NEGATIVE MG/DL
LDLC SERPL CALC-MCNC: 89 MG/DL
LEUKOCYTE ESTERASE UR QL STRIP: NEGATIVE
LYMPHOCYTES # BLD AUTO: 2 10E3/UL (ref 0.8–5.3)
LYMPHOCYTES NFR BLD AUTO: 29 %
MCH RBC QN AUTO: 31.8 PG (ref 26.5–33)
MCHC RBC AUTO-ENTMCNC: 32.7 G/DL (ref 31.5–36.5)
MCV RBC AUTO: 97 FL (ref 78–100)
MONOCYTES # BLD AUTO: 0.7 10E3/UL (ref 0–1.3)
MONOCYTES NFR BLD AUTO: 10 %
NEUTROPHILS # BLD AUTO: 4.1 10E3/UL (ref 1.6–8.3)
NEUTROPHILS NFR BLD AUTO: 58 %
NITRATE UR QL: NEGATIVE
NONHDLC SERPL-MCNC: 101 MG/DL
PH UR STRIP: 7 [PH] (ref 5–7)
PLATELET # BLD AUTO: 271 10E3/UL (ref 150–450)
POTASSIUM SERPL-SCNC: 4.6 MMOL/L (ref 3.4–5.3)
PSA SERPL DL<=0.01 NG/ML-MCNC: 6.07 NG/ML
RBC # BLD AUTO: 4.47 10E6/UL (ref 4.4–5.9)
RBC #/AREA URNS AUTO: ABNORMAL /HPF
SODIUM SERPL-SCNC: 139 MMOL/L (ref 135–145)
SP GR UR STRIP: 1.02 (ref 1–1.03)
TRIGL SERPL-MCNC: 61 MG/DL
UROBILINOGEN UR STRIP-ACNC: 0.2 E.U./DL
WBC # BLD AUTO: 7 10E3/UL (ref 4–11)
WBC #/AREA URNS AUTO: ABNORMAL /HPF

## 2024-09-26 PROCEDURE — 85025 COMPLETE CBC W/AUTO DIFF WBC: CPT | Performed by: INTERNAL MEDICINE

## 2024-09-26 PROCEDURE — G0439 PPPS, SUBSEQ VISIT: HCPCS | Performed by: INTERNAL MEDICINE

## 2024-09-26 PROCEDURE — 36415 COLL VENOUS BLD VENIPUNCTURE: CPT | Performed by: INTERNAL MEDICINE

## 2024-09-26 PROCEDURE — 99214 OFFICE O/P EST MOD 30 MIN: CPT | Mod: 25 | Performed by: INTERNAL MEDICINE

## 2024-09-26 PROCEDURE — 81001 URINALYSIS AUTO W/SCOPE: CPT | Performed by: INTERNAL MEDICINE

## 2024-09-26 PROCEDURE — G0103 PSA SCREENING: HCPCS | Performed by: INTERNAL MEDICINE

## 2024-09-26 PROCEDURE — G0008 ADMIN INFLUENZA VIRUS VAC: HCPCS | Performed by: INTERNAL MEDICINE

## 2024-09-26 PROCEDURE — 90662 IIV NO PRSV INCREASED AG IM: CPT | Performed by: INTERNAL MEDICINE

## 2024-09-26 PROCEDURE — 84460 ALANINE AMINO (ALT) (SGPT): CPT | Performed by: INTERNAL MEDICINE

## 2024-09-26 PROCEDURE — 80061 LIPID PANEL: CPT | Performed by: INTERNAL MEDICINE

## 2024-09-26 PROCEDURE — 80048 BASIC METABOLIC PNL TOTAL CA: CPT | Performed by: INTERNAL MEDICINE

## 2024-09-26 RX ORDER — LOSARTAN POTASSIUM 50 MG/1
50 TABLET ORAL DAILY
Qty: 90 TABLET | Refills: 3 | Status: SHIPPED | OUTPATIENT
Start: 2024-09-26

## 2024-09-26 RX ORDER — SIMVASTATIN 20 MG
20 TABLET ORAL AT BEDTIME
Qty: 90 TABLET | Refills: 3 | Status: SHIPPED | OUTPATIENT
Start: 2024-09-26

## 2024-09-26 NOTE — PATIENT INSTRUCTIONS
At [facility], we strive to deliver an exceptional experience to you, every time we see you. If you receive a survey, please let us know what we are doing well and/or what we could improve upon, as we do value your feedback.  If you have MyChart, you can expect to receive results automatically within 24 hours of their completion.  Your provider will send a note interpreting your results as well.   If you do not have MyChart, you should receive your results in about a week by mail.    Your care team:                            Family Medicine Internal Medicine   MD Dawson Steele, MD Grace Newby, MD Oumar Luke, MD Sridevi Brown, PANavyaC    Sancho Hicks, MD Pediatrics   Carolyn Mckeon, MD Gayla Coleman, MD Jazmin Peña, APRN CNP Nikki Foss APRN CNP   MD Jimena Francois, MD Xiao Mensah, CNP     Fred Shipman, CNP Same-Day Provider (No follow-up visits)   IGNACIO King, DNP Luisa Perales, IGNACIO Benson, FNP, BC DONOVAN HernandezC     Clinic hours: Monday - Thursday 7 am-6 pm; Fridays 7 am-5 pm.   Urgent care: Monday - Friday 10 am- 8 pm; Saturday and Sunday 9 am-5 pm.    Clinic: [phone]       [facility]: Monday - Thursday 8 am - 7 pm; Friday 8 am - 6 pm  [facility]: [phone]

## 2024-09-26 NOTE — PROGRESS NOTES
Preventive Care Visit  [facility] DEBBY Luke MD, Internal Medicine  Sep 26, 2024      Assessment & Plan     Medicare annual wellness visit, subsequent  He is up to date with all the vaccines except RSV and flu and COVID  We discussed about those vaccines  He is going to get the flu vaccine today  He is due for a colonoscopy  He needs this to be done every 5 years  He was due for it in 2023  We discussed about it  I am going to place an order and he is going to get it done in spring  He had some fractures of the ribs   They are healing very well and the pain is under good control  He does have some LUTS symptoms  He is taking saw palmetto which is helping him to some extent    Essential hypertension with goal blood pressure less than 140/90  He tells me his blood pressure is up-and-down but for most part it remains below 140 systolic and 90 diastolic  Initially when he came today his blood pressure was elevated with 170 systolic but I checked it it came down nicely to 136 systolic  Continue with his current dose of losartan  - BASIC METABOLIC PANEL; Future  - losartan (COZAAR) 50 MG tablet; Take 1 tablet (50 mg) by mouth daily.  - BASIC METABOLIC PANEL    Hyperlipidemia LDL goal <130    - simvastatin (ZOCOR) 20 MG tablet; Take 1 tablet (20 mg) by mouth at bedtime.  - ALT; Future  - Lipid panel reflex to direct LDL Fasting; Future  - ALT  - Lipid panel reflex to direct LDL Fasting    Colon cancer screening  As stated above he is due for a colonoscopy in 2023 but it never got it  I put the order today  - Colonoscopy Screening  Referral; Future    Gross hematuria  Complaining of some hematuria  He tells me this is gauze and happens once a week  This happens randomly at anytime of the day  It happened a few times in the last 1 month but on average it is once a week  Generally it happens only once on any day  Painless hematuria  He does have some LUTS symptoms  He tells me that  in the past he had issues with some urinary retention and was seen by a urologist many years ago  Of note he had elevated PSA in the past and was also evaluated by urology  But the last few PSAs have been normal  - CT Urogram wo & w Contrast; Future  - Adult Urology  Referral; Future  - UA Macroscopic with reflex to Microscopic and Culture - Lab Collect; Future  - CBC with platelets and differential; Future  - UA Macroscopic with reflex to Microscopic and Culture - Lab Collect  - CBC with platelets and differential  - UA Microscopic with Reflex to Culture    Screening for prostate cancer  As above he has a history of elevated PSA and was evaluated by urology  We will recheck his PSA today  - PSA, screen; Future  - PSA, screen    Patient has been advised of split billing requirements and indicates understanding: No        Counseling  Appropriate preventive services were addressed with this patient via screening, questionnaire, or discussion as appropriate for fall prevention, nutrition, physical activity, Tobacco-use cessation, social engagement, weight loss and cognition.  Checklist reviewing preventive services available has been given to the patient.  Reviewed patient's diet, addressing concerns and/or questions.   He is at risk for lack of exercise and has been provided with information to increase physical activity for the benefit of his well-being.   Discussed possible causes of fatigue. The patient was provided with written information regarding signs of hearing loss.           Subjective   Matt is a 79 year old, presenting for the following:  Annual Visit        9/26/2024     7:03 AM   Additional Questions   Roomed by yolanda         Health Care Directive  Patient has a Health Care Directive on file  Advance care planning document is on file and is current.    HPI  Here for annual Medicare wellness visit            9/24/2024   General Health   How would you rate your overall physical health? Good   Feel  stress (tense, anxious, or unable to sleep) To some extent      (!) STRESS CONCERN      9/24/2024   Nutrition   Diet: Regular (no restrictions)            9/24/2024   Exercise   Days per week of moderate/strenous exercise 2 days   Average minutes spent exercising at this level 30 min      (!) EXERCISE CONCERN      9/24/2024   Social Factors   Frequency of gathering with friends or relatives Once a week   Worry food won't last until get money to buy more No   Food not last or not have enough money for food? No   Do you have housing? (Housing is defined as stable permanent housing and does not include staying ouside in a car, in a tent, in an abandoned building, in an overnight shelter, or couch-surfing.) Yes   Are you worried about losing your housing? No   Lack of transportation? No   Unable to get utilities (heat,electricity)? No            9/26/2024   Fall Risk   Gait Speed Test Interpretation Less than or equal to 5.00 seconds - PASS                9/24/2024   Activities of Daily Living- Home Safety   Needs help with the following daily activites None of the above   Safety concerns in the home None of the above            9/24/2024   Dental   Dentist two times every year? Yes            9/24/2024   Hearing Screening   Hearing concerns? (!) I NEED TO ASK PEOPLE TO SPEAK UP OR REPEAT THEMSELVES.    (!) IT'S HARD TO FOLLOW A CONVERSATION IN A NOISY RESTAURANT OR CROWDED ROOM.       Multiple values from one day are sorted in reverse-chronological order         9/24/2024   Driving Risk Screening   Patient/family members have concerns about driving No            9/24/2024   General Alertness/Fatigue Screening   Have you been more tired than usual lately? (!) YES            9/24/2024   Urinary Incontinence Screening   Bothered by leaking urine in past 6 months No            9/24/2024   TB Screening   Were you born outside of the [place]? No            Today's PHQ-2 Score:       9/26/2024     6:55 AM   PHQ-2 ( 1999 [employer])    Q1: Little interest or pleasure in doing things 0   Q2: Feeling down, depressed or hopeless 0   PHQ-2 Score 0   Q1: Little interest or pleasure in doing things Not at all   Q2: Feeling down, depressed or hopeless Not at all   PHQ-2 Score 0           9/24/2024   Substance Use   Alcohol more than 3/day or more than 7/wk No   Do you have a current opioid prescription? No   How severe/bad is pain from 1 to 10? 2/10   Do you use any other substances recreationally? No        Social History     Tobacco Use    Smoking status: Former     Current packs/day: 0.50     Average packs/day: 0.5 packs/day for 10.0 years (5.0 ttl pk-yrs)     Types: Cigarettes    Smokeless tobacco: Never    Tobacco comments:     35 years    Substance Use Topics    Alcohol use: Yes     Alcohol/week: 0.0 standard drinks of alcohol     Comment: glass of wine per day or less    Drug use: No                 Reviewed and updated as needed this visit by Provider                      Current providers sharing in care for this patient include:  Patient Care Team:  Oumar Luke MD as PCP - General (Internal Medicine)  Shree Gomes MD as MD (Advanced Heart Failure and Transplant Cardiology)  Shree Gomes MD as Assigned Heart and Vascular Provider  Oumar Luke MD as Assigned PCP    The following health maintenance items are reviewed in Epic and correct as of today:  Health Maintenance   Topic Date Due    RSV VACCINE (1 - 1-dose 75+ series) Never done    BMP  08/04/2024    COVID-19 Vaccine (1 - 2024-25 season) Never done    MEDICARE ANNUAL WELLNESS VISIT  09/22/2024    LIPID  05/31/2025    ANNUAL REVIEW OF HM ORDERS  09/26/2025    FALL RISK ASSESSMENT  09/26/2025    GLUCOSE  08/04/2026    DTAP/TDAP/TD IMMUNIZATION (4 - Td or Tdap) 09/15/2026    ADVANCE CARE PLANNING  09/22/2028    PHQ-2 (once per calendar year)  Completed    INFLUENZA VACCINE  Completed    Pneumococcal Vaccine: 65+ Years  Completed    ZOSTER IMMUNIZATION  Completed    HPV  "IMMUNIZATION  Aged Out    MENINGITIS IMMUNIZATION  Aged Out    RSV MONOCLONAL ANTIBODY  Aged Out    COLORECTAL CANCER SCREENING  Discontinued    LUNG CANCER SCREENING  Discontinued         Review of Systems  Constitutional, HEENT, cardiovascular, pulmonary, gi and gu systems are negative, except as otherwise noted.     Objective    Exam  /80   Pulse 93   Temp 98.3  F (36.8  C) (Temporal)   Resp 14   Ht 1.676 m (5' 6\")   Wt 66.6 kg (146 lb 12.8 oz)   SpO2 96%   BMI 23.69 kg/m     Estimated body mass index is 23.69 kg/m  as calculated from the following:    Height as of this encounter: 1.676 m (5' 6\").    Weight as of this encounter: 66.6 kg (146 lb 12.8 oz).    Physical Exam  GENERAL: alert and no distress  EYES: Eyes grossly normal to inspection, PERRL and conjunctivae and sclerae normal  HENT: ear canals and TM's normal, nose and mouth without ulcers or lesions  NECK: no adenopathy, no asymmetry, masses, or scars  RESP: lungs clear to auscultation - no rales, rhonchi or wheezes  CV: regular rate and rhythm, normal S1 S2, no S3 or S4, no murmur, click or rub, no peripheral edema  ABDOMEN: soft, nontender, no hepatosplenomegaly, no masses and bowel sounds normal  MS: no gross musculoskeletal defects noted, no edema  SKIN: Multiple seborrheic keratosis and actinic keratosis  Follows with urologist  NEURO: Normal strength and tone, mentation intact and speech normal  PSYCH: mentation appears normal, affect normal/bright         9/26/2024   Mini Cog   Clock Draw Score 2 Normal   3 Item Recall 3 objects recalled   Mini Cog Total Score 5                 Signed Electronically by: Oumar Luke MD    "

## 2024-10-03 ENCOUNTER — ANCILLARY PROCEDURE (OUTPATIENT)
Dept: CT IMAGING | Facility: CLINIC | Age: 79
End: 2024-10-03
Attending: INTERNAL MEDICINE
Payer: COMMERCIAL

## 2024-10-03 ENCOUNTER — MYC MEDICAL ADVICE (OUTPATIENT)
Dept: FAMILY MEDICINE | Facility: CLINIC | Age: 79
End: 2024-10-03

## 2024-10-03 DIAGNOSIS — R31.0 GROSS HEMATURIA: ICD-10-CM

## 2024-10-03 PROCEDURE — 74178 CT ABD&PLV WO CNTR FLWD CNTR: CPT | Mod: TC | Performed by: RADIOLOGY

## 2024-10-03 RX ORDER — IOPAMIDOL 755 MG/ML
100 INJECTION, SOLUTION INTRAVASCULAR ONCE
Status: COMPLETED | OUTPATIENT
Start: 2024-10-03 | End: 2024-10-03

## 2024-10-03 RX ADMIN — IOPAMIDOL 100 ML: 755 INJECTION, SOLUTION INTRAVASCULAR at 11:33

## 2024-11-05 ENCOUNTER — MYC REFILL (OUTPATIENT)
Dept: FAMILY MEDICINE | Facility: CLINIC | Age: 79
End: 2024-11-05
Payer: COMMERCIAL

## 2024-11-05 DIAGNOSIS — I10 ESSENTIAL HYPERTENSION WITH GOAL BLOOD PRESSURE LESS THAN 140/90: ICD-10-CM

## 2024-11-05 DIAGNOSIS — E78.5 HYPERLIPIDEMIA LDL GOAL <130: ICD-10-CM

## 2024-11-05 RX ORDER — LOSARTAN POTASSIUM 50 MG/1
50 TABLET ORAL DAILY
Qty: 90 TABLET | Refills: 3 | Status: CANCELLED | OUTPATIENT
Start: 2024-11-05

## 2024-11-05 RX ORDER — SIMVASTATIN 20 MG
20 TABLET ORAL AT BEDTIME
Qty: 90 TABLET | Refills: 3 | Status: CANCELLED | OUTPATIENT
Start: 2024-11-05

## 2024-11-06 RX ORDER — LOSARTAN POTASSIUM 50 MG/1
50 TABLET ORAL DAILY
Qty: 90 TABLET | Refills: 2 | Status: SHIPPED | OUTPATIENT
Start: 2024-11-06

## 2024-11-06 RX ORDER — SIMVASTATIN 20 MG
20 TABLET ORAL AT BEDTIME
Qty: 90 TABLET | Refills: 2 | Status: SHIPPED | OUTPATIENT
Start: 2024-11-06

## 2024-11-15 ENCOUNTER — OFFICE VISIT (OUTPATIENT)
Dept: UROLOGY | Facility: CLINIC | Age: 79
End: 2024-11-15
Attending: UROLOGY
Payer: COMMERCIAL

## 2024-11-15 VITALS
HEART RATE: 83 BPM | DIASTOLIC BLOOD PRESSURE: 76 MMHG | BODY MASS INDEX: 23.4 KG/M2 | OXYGEN SATURATION: 98 % | WEIGHT: 145 LBS | SYSTOLIC BLOOD PRESSURE: 174 MMHG

## 2024-11-15 DIAGNOSIS — N40.1 BENIGN PROSTATIC HYPERPLASIA WITH INCOMPLETE BLADDER EMPTYING: ICD-10-CM

## 2024-11-15 DIAGNOSIS — R39.14 BENIGN PROSTATIC HYPERPLASIA WITH INCOMPLETE BLADDER EMPTYING: ICD-10-CM

## 2024-11-15 DIAGNOSIS — I10 ESSENTIAL HYPERTENSION WITH GOAL BLOOD PRESSURE LESS THAN 140/90: ICD-10-CM

## 2024-11-15 DIAGNOSIS — R31.0 GROSS HEMATURIA: Primary | ICD-10-CM

## 2024-11-15 DIAGNOSIS — R97.20 ELEVATED PROSTATE SPECIFIC ANTIGEN (PSA): ICD-10-CM

## 2024-11-15 RX ORDER — FINASTERIDE 5 MG/1
5 TABLET, FILM COATED ORAL DAILY
Qty: 90 TABLET | Refills: 3 | Status: SHIPPED | OUTPATIENT
Start: 2024-11-15

## 2024-11-15 RX ORDER — MAG HYDROX/ALUMINUM HYD/SIMETH 400-400-40
450 SUSPENSION, ORAL (FINAL DOSE FORM) ORAL DAILY
COMMUNITY

## 2024-11-15 RX ORDER — TAMSULOSIN HYDROCHLORIDE 0.4 MG/1
0.4 CAPSULE ORAL EVERY EVENING
Qty: 90 CAPSULE | Refills: 3 | Status: SHIPPED | OUTPATIENT
Start: 2024-11-15

## 2024-11-15 NOTE — PROGRESS NOTES
S: Alonso Churchill is a pleasant  79 year old male who was requested to be seen by  Oumar Luke for a consult with regard to patient's urinary complaints/gross hematuria.  Patient complains of Nocturia x 1, Intermittency, and slow urinary stream.  He has history of elevated PSA.  Symptoms have been on going for several years(s).  Seems to be worsened over time.  His recent PSA was found to be   PSA   Date Value Ref Range Status   09/15/2020 4.35 (H) 0 - 4 ug/L Final     Comment:     Assay Method:  Chemiluminescence using Siemens Vista analyzer     Prostate Specific Antigen Screen   Date Value Ref Range Status   09/26/2024 6.07 ng/mL Final     Comment:     No reference ranges have been established for patients over 80 years.   09/23/2022 3.64 0.00 - 4.00 ug/L Final   .  His AUA Symptom Score:  19.  He is s/p TURP in 2009.  Recently he also c/o intermittent gross hematuria without any other symptoms.  CT urogram was negative.    Current Outpatient Medications   Medication Sig Dispense Refill    cholecalciferol (VITAMIN D) 1000 UNIT tablet Take 1 tablet (1,000 Units) by mouth daily 100 tablet 3    Cyanocobalamin (B-12 PO) Take 400 mg by mouth      losartan (COZAAR) 50 MG tablet Take 1 tablet (50 mg) by mouth daily. 90 tablet 2    MELOXICAM PO Take 7.5 mg by mouth daily       MULTI-VITAMIN PO 1 Tablet every day      Omega-3 Fatty Acids (OMEGA-3 FISH OIL) 1200 MG CAPS Take by mouth 2 times daily      Psyllium (METAMUCIL PO) Take by mouth every morning      saw palmetto 450 MG CAPS capsule Take 450 mg by mouth daily.      simvastatin (ZOCOR) 20 MG tablet Take 1 tablet (20 mg) by mouth at bedtime. 90 tablet 2    vitamin E (TOCOPHEROL) 200 units (90 mg) capsule Take 200 Units by mouth daily      Zinc Sulfate (ZINC 15 PO) Take by mouth three times a week       Allergies   Allergen Reactions    Lisinopril      Cough       Past Medical History:   Diagnosis Date    Actinic keratosis     BPH     Cervical stenosis of spinal  canal 9/12/2014    Hyperlipidemia     Hypertension     IBS (irritable bowel syndrome)     Insomnia      Past Surgical History:   Procedure Laterality Date    COLONOSCOPY WITH CO2 INSUFFLATION N/A 9/20/2018    Procedure: COLONOSCOPY WITH CO2 INSUFFLATION;  Colonoscopy;  Surgeon: Duane, William Charles, MD;  Location: [facility] OR    HEMORRHOIDECTOMY BANDING  2017    ROTATOR CUFF REPAIR RT/LT  3/2012    right  - dr. benavidez    SURGICAL HISTORY OF -   disc surgery    SURGICAL HISTORY OF -   back surgery      Family History   Problem Relation Age of Onset    Heart Disease Father     Cerebrovascular Disease Father     Prostate Cancer Brother         had surgery     He does not have a family history of prostate cancer.  Social History     Socioeconomic History    Marital status:      Spouse name: None    Number of children: None    Years of education: None    Highest education level: None   Occupational History     Employer: RETIRED   Tobacco Use    Smoking status: Former     Current packs/day: 0.50     Average packs/day: 0.5 packs/day for 10.0 years (5.0 ttl pk-yrs)     Types: Cigarettes    Smokeless tobacco: Never    Tobacco comments:     35 years    Substance and Sexual Activity    Alcohol use: Yes     Alcohol/week: 0.0 standard drinks of alcohol     Comment: glass of wine per day or less    Drug use: No    Sexual activity: Yes     Partners: Female     Birth control/protection: Surgical, None   Other Topics Concern    Parent/sibling w/ CABG, MI or angioplasty before 65F 55M? No     Social Drivers of Health     Financial Resource Strain: Low Risk  (9/24/2024)    Financial Resource Strain     Within the past 12 months, have you or your family members you live with been unable to get utilities (heat, electricity) when it was really needed?: No   Food Insecurity: Low Risk  (9/24/2024)    Food Insecurity     Within the past 12 months, did you worry that your food would run out before you got money to buy more?: No     Within  the past 12 months, did the food you bought just not last and you didn t have money to get more?: No   Transportation Needs: Low Risk  (9/24/2024)    Transportation Needs     Within the past 12 months, has lack of transportation kept you from medical appointments, getting your medicines, non-medical meetings or appointments, work, or from getting things that you need?: No   Physical Activity: Insufficiently Active (9/24/2024)    Exercise Vital Sign     Days of Exercise per Week: 2 days     Minutes of Exercise per Session: 30 min   Stress: Stress Concern Present (9/24/2024)    [employer] - Occupational Stress Questionnaire     Feeling of Stress : To some extent   Social Connections: Unknown (9/24/2024)    Social Connection and Isolation Panel [NHANES]     Frequency of Social Gatherings with Friends and Family: Once a week   Interpersonal Safety: Low Risk  (9/26/2024)    Interpersonal Safety     Do you feel physically and emotionally safe where you currently live?: Yes     Within the past 12 months, have you been hit, slapped, kicked or otherwise physically hurt by someone?: No     Within the past 12 months, have you been humiliated or emotionally abused in other ways by your partner or ex-partner?: No   Housing Stability: Low Risk  (9/24/2024)    Housing Stability     Do you have housing? : Yes     Are you worried about losing your housing?: No        REVIEW OF SYSTEMS  =================  C: NEGATIVE for fever, chills, change in weight  I: NEGATIVE for worrisome rashes, moles or lesions  E/M: NEGATIVE for ear, mouth and throat problems  R: NEGATIVE for significant cough or SHORTNESS OF BREATH  CV:  NEGATIVE for chest pain, palpitations or peripheral edema  GI: NEGATIVE for nausea, abdominal pain, heartburn, or change in bowel habits  NEURO: NEGATIVE numbness/weakness  : see HPI  PSYCH: NEGATIVE depression/anxiety  LYmph: no new enlarged lymph nodes  Ortho: no new trauma/movements            O: Exam:BP (!) 174/76   Pulse 83   Wt 65.8 kg (145 lb)   SpO2 98%   BMI 23.40 kg/m     Constitutional: healthy, alert and no distress  Cardiovascular: negative, PMI normal.   Respiratory: negative, no evidence of respiratory distress  Gastrointestinal: Abdomen soft, non-tender. BS normal. No masses, organomegaly  : penis no discharge. Testis no masses.  No scrotal skin lesion.   ml.  Musculoskeletal: extremities normal- no gross deformities noted, gait normal and normal muscle tone  Skin: no suspicious lesions or rashes  Neurologic: Alert and oriented  Psychiatric: mentation appears normal. and affect normal/bright  Hematologic/Lymphatic/Immunologic: normal ant/post cervical, axillary, supraclavicular and inguinal nodes     S: Alonso Churchill is a 79 year old male returns for hematuria.    Patient is draped and prepped.  Flexible cystoscopy placed under direct vision.      The anterior urethra is normal   The prostatic urethra showed bilateral lobe enlargement.     The length is 3cm,  the coaptation is 3 cm.     In the bladder there is trabeculation grade 2.    Assessment/Plan:  (R31.0) Gross hematuria  (primary encounter diagnosis)  Comment: due to prostatic bleeding  Plan: UA Macroscopic with reflex to Microscopic and         Culture, CYSTOURETHROSCOPY (46868)                     (N40.1,  R39.14) Benign prostatic hyperplasia with incomplete bladder emptying  Comment:  repeat PVR zero cc  Plan: MEASURE POST-VOID RESIDUAL URINE/BLADDER         CAPACITY, US NON-IMAGING         Start flomax/proscar    (R97.20) Elevated prostate specific antigen (PSA)  Comment:    Plan: ok for age    (I10) Essential hypertension with goal blood pressure less than 140/90  Comment:    Plan: Matt to follow up with Primary Care provider regarding elevated blood pressure.

## 2025-04-01 ENCOUNTER — TELEPHONE (OUTPATIENT)
Dept: GASTROENTEROLOGY | Facility: CLINIC | Age: 80
End: 2025-04-01
Payer: COMMERCIAL

## 2025-04-01 NOTE — TELEPHONE ENCOUNTER
"Endoscopy Scheduling Screen    Have you had any respiratory illness or flu-like symptoms in the last 10 days?  No    What is your communication preference for Instructions and/or Bowel Prep?   MyChart    What insurance is in the chart?  Other:  [facility] Medicare    Ordering/Referring Provider: Oumar Luke MD in [facility] FP/IM/PEDS   (If ordering provider performs procedure, schedule with ordering provider unless otherwise instructed. )    BMI: Estimated body mass index is 23.4 kg/m  as calculated from the following:    Height as of 9/26/24: 1.676 m (5' 6\").    Weight as of 11/15/24: 65.8 kg (145 lb).     Sedation Ordered  moderate sedation.   If patient BMI > 50 do not schedule in [facility].    If patient BMI > 45 do not schedule at [facility].    Are you taking methadone or Suboxone?  NO, No RN review required.    Have you been diagnosed and are being treated for severe PTSD or severe anxiety?  NO, No RN review required.    Are you taking any prescription medications for pain 3 or more times per week?   NO, No RN review required.    Do you have a history of malignant hyperthermia?  No    (Females) Are you currently pregnant?   NA     Have you been diagnosed or told you have pulmonary hypertension?   No    Do you have an LVAD?  No    Have you been told you have moderate to severe sleep apnea?  No.    Have you been told you have COPD, asthma, or any other lung disease?  No    Has your doctor ordered any cardiac tests like echo, angiogram, stress test, ablation, or EKG, that you have not completed yet?  No    Do you  have a history of any heart conditions?  No     Have you ever had or are you waiting for an organ transplant?  No. Continue scheduling, no site restrictions.    Have you had a stroke or transient ischemic attack (TIA aka \"mini stroke\") in the last 2 years?   No.    Have you been diagnosed with or been told you have cirrhosis of the liver?   No.    Are you currently on dialysis?   No    Do you need assistance " "transferring?   No    BMI: Estimated body mass index is 23.4 kg/m  as calculated from the following:    Height as of 9/26/24: 1.676 m (5' 6\").    Weight as of 11/15/24: 65.8 kg (145 lb).     Is patients BMI > 40 and scheduling location [facility]?  No    Do you take an injectable or oral medication for weight loss or diabetes (excluding insulin)?  No    Do you take the medication Naltrexone?  No    Do you take blood thinners?  No       Prep   Are you currently on dialysis or do you have chronic kidney disease?  No    Do you have a diagnosis of diabetes?  No    Do you have a diagnosis of cystic fibrosis (CF)?  No    On a regular basis do you go 3 -5 days between bowel movements?  No    BMI > 40?  No    Preferred Pharmacy:    [id number] [place] [facility] [place] - [address]  [address]  [place] [address]  Phone: [phone] Fax: [phone]      Final Scheduling Details     Procedure scheduled  Colonoscopy    Surgeon:  Refugio     Date of procedure:  5.13.25      Pre-OP / PAC:   No - Not required for this site.    Location  [facility] - [facility] - Patient preference.    Sedation   Moderate Sedation - Per order.      Patient Reminders:   You will receive a call from a Nurse to review instructions and health history.  This assessment must be completed prior to your procedure.  Failure to complete the Nurse assessment may result in the procedure being cancelled.      On the day of your procedure, please designate an adult(s) who can drive you home stay with you for the next 24 hours. The medicines used in the exam will make you sleepy. You will not be able to drive.      You cannot take public transportation, ride share services, or non-medical taxi service without a responsible caregiver.  Medical transport services are allowed with the requirement that a responsible caregiver will receive you at your destination.  We require that drivers and caregivers are confirmed prior to your procedure.    "

## 2025-04-07 ENCOUNTER — TELEPHONE (OUTPATIENT)
Dept: GASTROENTEROLOGY | Facility: CLINIC | Age: 80
End: 2025-04-07
Payer: COMMERCIAL

## 2025-04-07 NOTE — TELEPHONE ENCOUNTER
Caller: spouse [place]    Reason for Reschedule/Cancellation (please be detailed, any staff messages or encounters to note?):   Mother's Day weekend and will be out of town to prep    Did you cancel or rescheduled an E[facility] procedure? No.    Is screening questionnaire older than 3 months from the reschedule date.   If Yes, please complete screening questionnaire. No    Prior to reschedule please review:  Ordering Provider: Ti  Sedation Determined: mod  Does patient have any ASC Exclusions, please identify?: no    Notes on Cancelled Procedure:  Procedure: Lower Endoscopy [Colonoscopy]   Date: 5/13  Location: [facility]; [address], [place] [id number]   Surgeon: Yonas    Rescheduled: Yes,   Procedure: Lower Endoscopy [Colonoscopy]    Date: 5/9   Location: [facility]; [address], [place] [id number]    Surgeon: Refugio   Sedation Level Scheduled  mod ,  Reason for Sedation Level ordered   Instructions updated and sent: y     Does patient need PAC or Pre -Op Rescheduled? : no

## 2025-04-23 ENCOUNTER — TELEPHONE (OUTPATIENT)
Dept: GASTROENTEROLOGY | Facility: CLINIC | Age: 80
End: 2025-04-23

## 2025-05-07 ENCOUNTER — TELEPHONE (OUTPATIENT)
Dept: GASTROENTEROLOGY | Facility: CLINIC | Age: 80
End: 2025-05-07
Payer: COMMERCIAL

## 2025-05-07 NOTE — TELEPHONE ENCOUNTER
Left voicemail of arrival time of 10:30 AM.     [employer] message sent with updated arrival time.

## 2025-05-09 ENCOUNTER — RESULTS FOLLOW-UP (OUTPATIENT)
Dept: FAMILY MEDICINE | Facility: CLINIC | Age: 80
End: 2025-05-09

## 2025-05-09 ENCOUNTER — HOSPITAL ENCOUNTER (OUTPATIENT)
Facility: AMBULATORY SURGERY CENTER | Age: 80
Discharge: HOME | End: 2025-05-09
Attending: SURGERY | Admitting: SURGERY
Payer: COMMERCIAL

## 2025-05-09 VITALS
RESPIRATION RATE: 16 BRPM | DIASTOLIC BLOOD PRESSURE: 77 MMHG | OXYGEN SATURATION: 99 % | TEMPERATURE: 97.3 F | SYSTOLIC BLOOD PRESSURE: 135 MMHG | HEART RATE: 70 BPM

## 2025-05-09 LAB — COLONOSCOPY: NORMAL

## 2025-05-09 PROCEDURE — G8918 PT W/O PREOP ORDER IV AB PRO: HCPCS

## 2025-05-09 PROCEDURE — G0105 COLORECTAL SCRN; HI RISK IND: HCPCS

## 2025-05-09 PROCEDURE — G8907 PT DOC NO EVENTS ON DISCHARG: HCPCS

## 2025-05-09 RX ORDER — PROCHLORPERAZINE MALEATE 5 MG/1
5 TABLET ORAL EVERY 6 HOURS PRN
Status: DISCONTINUED | OUTPATIENT
Start: 2025-05-09 | End: 2025-05-10 | Stop reason: HOSPADM

## 2025-05-09 RX ORDER — NALOXONE HYDROCHLORIDE 0.4 MG/ML
0.4 INJECTION, SOLUTION INTRAMUSCULAR; INTRAVENOUS; SUBCUTANEOUS
Status: DISCONTINUED | OUTPATIENT
Start: 2025-05-09 | End: 2025-05-10 | Stop reason: HOSPADM

## 2025-05-09 RX ORDER — FLUMAZENIL 0.1 MG/ML
0.2 INJECTION, SOLUTION INTRAVENOUS
Status: ACTIVE | OUTPATIENT
Start: 2025-05-09 | End: 2025-05-09

## 2025-05-09 RX ORDER — ONDANSETRON 2 MG/ML
4 INJECTION INTRAMUSCULAR; INTRAVENOUS EVERY 6 HOURS PRN
Status: DISCONTINUED | OUTPATIENT
Start: 2025-05-09 | End: 2025-05-10 | Stop reason: HOSPADM

## 2025-05-09 RX ORDER — LIDOCAINE 40 MG/G
CREAM TOPICAL
Status: DISCONTINUED | OUTPATIENT
Start: 2025-05-09 | End: 2025-05-10 | Stop reason: HOSPADM

## 2025-05-09 RX ORDER — NALOXONE HYDROCHLORIDE 0.4 MG/ML
0.2 INJECTION, SOLUTION INTRAMUSCULAR; INTRAVENOUS; SUBCUTANEOUS
Status: DISCONTINUED | OUTPATIENT
Start: 2025-05-09 | End: 2025-05-10 | Stop reason: HOSPADM

## 2025-05-09 RX ORDER — ONDANSETRON 4 MG/1
4 TABLET, ORALLY DISINTEGRATING ORAL EVERY 6 HOURS PRN
Status: DISCONTINUED | OUTPATIENT
Start: 2025-05-09 | End: 2025-05-10 | Stop reason: HOSPADM

## 2025-05-09 RX ORDER — FENTANYL CITRATE 50 UG/ML
INJECTION, SOLUTION INTRAMUSCULAR; INTRAVENOUS PRN
Status: DISCONTINUED | OUTPATIENT
Start: 2025-05-09 | End: 2025-05-09 | Stop reason: HOSPADM

## 2025-05-09 RX ORDER — ONDANSETRON 2 MG/ML
4 INJECTION INTRAMUSCULAR; INTRAVENOUS
Status: DISCONTINUED | OUTPATIENT
Start: 2025-05-09 | End: 2025-05-10 | Stop reason: HOSPADM

## 2025-05-09 NOTE — H&P
Pre-Endoscopy History and Physical     Alonso Churchill MRN# [id number]   YOB: 1945 Age: 80 year old     Date of Procedure: 5/9/2025  Primary care provider: Oumar Luke  Type of Endoscopy: colonoscopy  Reason for Procedure: history of sessile serrated adenoma  Type of Anesthesia Anticipated: Moderate Sedation    HPI:    Alonso is a 80 year old male who will be undergoing the above procedure.    1 SSA in 2011  Clear in 2018    A history and physical has been performed. The patient's medications and allergies have been reviewed. The risks and benefits of the procedure and the sedation options and risks were discussed with the patient.  All questions were answered and informed consent was obtained.      He denies a personal or family history of anesthesia complications or bleeding disorders.     Allergies   Allergen Reactions    Lisinopril      Cough          Cannot display prior to admission medications because the patient has not been admitted in this contact.     Current Outpatient Medications   Medication Sig Dispense Refill    finasteride (PROSCAR) 5 MG tablet Take 1 tablet (5 mg) by mouth daily. 90 tablet 3    losartan (COZAAR) 50 MG tablet Take 1 tablet (50 mg) by mouth daily. 90 tablet 2    simvastatin (ZOCOR) 20 MG tablet Take 1 tablet (20 mg) by mouth at bedtime. 90 tablet 2    tamsulosin (FLOMAX) 0.4 MG capsule Take 1 capsule (0.4 mg) by mouth every evening. 90 capsule 3    cholecalciferol (VITAMIN D) 1000 UNIT tablet Take 1 tablet (1,000 Units) by mouth daily 100 tablet 3    Cyanocobalamin (B-12 PO) Take 400 mg by mouth      MELOXICAM PO Take 7.5 mg by mouth daily       MULTI-VITAMIN PO 1 Tablet every day      Omega-3 Fatty Acids (OMEGA-3 FISH OIL) 1200 MG CAPS Take by mouth 2 times daily      Psyllium (METAMUCIL PO) Take by mouth every morning      saw palmetto 450 MG CAPS capsule Take 450 mg by mouth daily.      vitamin E (TOCOPHEROL) 200 units (90 mg) capsule Take 200 Units by mouth  daily      Zinc Sulfate (ZINC 15 PO) Take by mouth three times a week       Current Facility-Administered Medications   Medication Dose Route Frequency Provider Last Rate Last Admin    lidocaine (LMX4) kit   Topical Q1H PRN Terry Huff MD        lidocaine 1 % 0.1-1 mL  0.1-1 mL Other Q1H PRN Terry Huff MD        ondansetron (ZOFRAN) injection 4 mg  4 mg Intravenous Once PRN Terry Huff MD        sodium chloride (PF) 0.9% PF flush 3 mL  3 mL Intracatheter Q8H DENNIS Terry Huff MD        sodium chloride (PF) 0.9% PF flush 3 mL  3 mL Intracatheter q1 min prn Terry Huff MD             Patient Active Problem List   Diagnosis    IBS (irritable bowel syndrome)    Hyperlipidemia LDL goal <130    AK (actinic keratosis)    Family history of prostate cancer    Cervical stenosis of spinal canal    Essential hypertension with goal blood pressure less than 140/90    Wears hearing aid    Elevated prostate specific antigen (PSA)        Past Medical History:   Diagnosis Date    Actinic keratosis     BPH     Cervical stenosis of spinal canal 9/12/2014    Hyperlipidemia     Hypertension     IBS (irritable bowel syndrome)     Insomnia         Past Surgical History:   Procedure Laterality Date    COLONOSCOPY WITH CO2 INSUFFLATION N/A 9/20/2018    Procedure: COLONOSCOPY WITH CO2 INSUFFLATION;  Colonoscopy;  Surgeon: Duane, William Charles, MD;  Location: [facility] OR    HEMORRHOIDECTOMY BANDING  2017    ROTATOR CUFF REPAIR RT/LT  3/2012    right  - dr. benavidez    SURGICAL HISTORY OF -   disc surgery    SURGICAL HISTORY OF -   back surgery       Social History     Tobacco Use    Smoking status: Former     Current packs/day: 0.50     Average packs/day: 0.5 packs/day for 10.0 years (5.0 ttl pk-yrs)     Types: Cigarettes    Smokeless tobacco: Never    Tobacco comments:     35 years    Substance Use Topics    Alcohol use: Yes     Alcohol/week: 0.0 standard drinks of alcohol     Comment: glass of  "wine per day or less       Family History   Problem Relation Age of Onset    Heart Disease Father     Cerebrovascular Disease Father     Prostate Cancer Brother         had surgery       REVIEW OF SYSTEMS:     5 point ROS negative except as noted above in HPI, including Gen., Resp., CV, GI &  system review.      PHYSICAL EXAM:   BP (!) 171/84   Pulse 84   Temp 97.3  F (36.3  C) (Temporal)   Resp 18   SpO2 99%  Estimated body mass index is 23.4 kg/m  as calculated from the following:    Height as of 9/26/24: 1.676 m (5' 6\").    Weight as of 11/15/24: 65.8 kg (145 lb).   GENERAL APPEARANCE: healthy, alert, and no distress  MENTAL STATUS: alert  AIRWAY EXAM: Mallampatti Class II (visualization of the soft palate, fauces, and uvula)  RESP: lungs clear to auscultation - no rales, rhonchi or wheezes  CV: regular rates and rhythm      DIAGNOSTICS:    Not indicated      IMPRESSION   ASA Class 2 - Mild systemic disease        PLAN:       Plan for colonoscopy. We discussed the risks, benefits and alternatives and the patient wished to proceed.    The above has been forwarded to the consulting provider.      Signed Electronically by: Terry Huff MD  May 9, 2025    "

## (undated) DEVICE — LIFTER SURGICAL ASCENDO SUBMUCOSAL LIFT AGENT BX00712934

## (undated) DEVICE — GLOVE BIOGEL PI ULTRATOUCH G SZ 7.5 42175

## (undated) DEVICE — PREP CHLORAPREP 26ML TINTED ORANGE  260815

## (undated) DEVICE — GLOVE BIOGEL PI MICRO INDICATOR UNDERGLOVE SZ 7.5 48975

## (undated) DEVICE — SOL WATER IRRIG 1000ML BOTTLE 07139-09

## (undated) RX ORDER — SIMETHICONE 40MG/0.6ML
SUSPENSION, DROPS(FINAL DOSAGE FORM)(ML) ORAL
Status: DISPENSED
Start: 2018-09-20

## (undated) RX ORDER — FENTANYL CITRATE 50 UG/ML
INJECTION, SOLUTION INTRAMUSCULAR; INTRAVENOUS
Status: DISPENSED
Start: 2025-05-09

## (undated) RX ORDER — FENTANYL CITRATE 50 UG/ML
INJECTION, SOLUTION INTRAMUSCULAR; INTRAVENOUS
Status: DISPENSED
Start: 2018-09-20